# Patient Record
Sex: MALE | Race: WHITE | Employment: OTHER | URBAN - METROPOLITAN AREA
[De-identification: names, ages, dates, MRNs, and addresses within clinical notes are randomized per-mention and may not be internally consistent; named-entity substitution may affect disease eponyms.]

---

## 2017-02-09 ENCOUNTER — HOSPITAL ENCOUNTER (OUTPATIENT)
Age: 72
Discharge: HOME | End: 2017-02-09
Payer: MEDICARE

## 2017-02-09 LAB
ANION GAP SERPL CALCULATED.3IONS-SCNC: 13 MMOL/L (ref 9–17)
BUN BLDV-MCNC: 43 MG/DL (ref 8–23)
BUN/CREAT BLD: ABNORMAL (ref 9–20)
CALCIUM SERPL-MCNC: 9.5 MG/DL (ref 8.6–10.4)
CHLORIDE BLD-SCNC: 103 MMOL/L (ref 98–107)
CO2: 21 MMOL/L (ref 20–31)
CREAT SERPL-MCNC: 1.7 MG/DL (ref 0.7–1.2)
GFR AFRICAN AMERICAN: 48 ML/MIN
GFR NON-AFRICAN AMERICAN: 40 ML/MIN
GFR SERPL CREATININE-BSD FRML MDRD: ABNORMAL ML/MIN/{1.73_M2}
GFR SERPL CREATININE-BSD FRML MDRD: ABNORMAL ML/MIN/{1.73_M2}
GLUCOSE BLD-MCNC: 114 MG/DL (ref 70–99)
POTASSIUM SERPL-SCNC: 5 MMOL/L (ref 3.7–5.3)
SODIUM BLD-SCNC: 137 MMOL/L (ref 135–144)

## 2017-02-09 PROCEDURE — 80048 BASIC METABOLIC PNL TOTAL CA: CPT

## 2017-02-09 PROCEDURE — 36415 COLL VENOUS BLD VENIPUNCTURE: CPT

## 2017-07-27 ENCOUNTER — HOSPITAL ENCOUNTER (INPATIENT)
Age: 72
LOS: 2 days | Discharge: HOME | DRG: 549 | End: 2017-07-29
Attending: EMERGENCY MEDICINE | Admitting: FAMILY MEDICINE
Payer: MEDICARE

## 2017-07-27 ENCOUNTER — APPOINTMENT (OUTPATIENT)
Dept: GENERAL RADIOLOGY | Age: 72
DRG: 549 | End: 2017-07-27
Payer: MEDICARE

## 2017-07-27 DIAGNOSIS — M00.9 PYOGENIC ARTHRITIS OF LEFT KNEE JOINT, DUE TO UNSPECIFIED ORGANISM (HCC): Primary | ICD-10-CM

## 2017-07-27 DIAGNOSIS — M25.462 SWELLING OF LEFT KNEE JOINT: ICD-10-CM

## 2017-07-27 LAB
ABSOLUTE EOS #: 0 K/UL (ref 0–0.4)
ABSOLUTE LYMPH #: 0.5 K/UL (ref 1–4.8)
ABSOLUTE MONO #: 0.4 K/UL (ref 0.1–1.3)
ALBUMIN SERPL-MCNC: 3.9 G/DL (ref 3.5–5.2)
ALBUMIN/GLOBULIN RATIO: ABNORMAL (ref 1–2.5)
ALP BLD-CCNC: 109 U/L (ref 40–129)
ALT SERPL-CCNC: 38 U/L (ref 5–41)
ANION GAP SERPL CALCULATED.3IONS-SCNC: 16 MMOL/L (ref 9–17)
APPEARANCE FLUID: NORMAL
AST SERPL-CCNC: 38 U/L
BASOPHILS # BLD: 1 %
BASOPHILS ABSOLUTE: 0 K/UL (ref 0–0.2)
BILIRUB SERPL-MCNC: 0.89 MG/DL (ref 0.3–1.2)
BUN BLDV-MCNC: 46 MG/DL (ref 8–23)
BUN/CREAT BLD: ABNORMAL (ref 9–20)
C-REACTIVE PROTEIN: 122.1 MG/L (ref 0–5)
CALCIUM SERPL-MCNC: 9.5 MG/DL (ref 8.6–10.4)
CHLORIDE BLD-SCNC: 96 MMOL/L (ref 98–107)
CO2: 21 MMOL/L (ref 20–31)
COLOR FLUID: NORMAL
CREAT SERPL-MCNC: 1.57 MG/DL (ref 0.7–1.2)
CULTURE: NORMAL
DIFFERENTIAL TYPE: ABNORMAL
DIRECT EXAM: NORMAL
EOSINOPHILS RELATIVE PERCENT: 0 %
GFR AFRICAN AMERICAN: 53 ML/MIN
GFR NON-AFRICAN AMERICAN: 44 ML/MIN
GFR SERPL CREATININE-BSD FRML MDRD: ABNORMAL ML/MIN/{1.73_M2}
GFR SERPL CREATININE-BSD FRML MDRD: ABNORMAL ML/MIN/{1.73_M2}
GLUCOSE BLD-MCNC: 116 MG/DL (ref 70–99)
GLUCOSE BLD-MCNC: 134 MG/DL (ref 75–110)
HCT VFR BLD CALC: 39 % (ref 41–53)
HEMOGLOBIN: 12.9 G/DL (ref 13.5–17.5)
LACTIC ACID, WHOLE BLOOD: NORMAL MMOL/L (ref 0.7–2.1)
LACTIC ACID: 1.2 MMOL/L (ref 0.5–2.2)
LYMPHOCYTES # BLD: 9 %
Lab: NORMAL
MCH RBC QN AUTO: 30.2 PG (ref 26–34)
MCHC RBC AUTO-ENTMCNC: 33.1 G/DL (ref 31–37)
MCV RBC AUTO: 91.3 FL (ref 80–100)
MONOCYTES # BLD: 8 %
PDW BLD-RTO: 13.8 % (ref 11.5–14.9)
PLATELET # BLD: 118 K/UL (ref 150–450)
PLATELET ESTIMATE: ABNORMAL
PMV BLD AUTO: 8.6 FL (ref 6–12)
POTASSIUM SERPL-SCNC: 5.2 MMOL/L (ref 3.7–5.3)
RBC # BLD: 4.28 M/UL (ref 4.5–5.9)
RBC # BLD: ABNORMAL 10*6/UL
RBC FLUID: NORMAL /MM3
SEDIMENTATION RATE, ERYTHROCYTE: 54 MM (ref 0–15)
SEG NEUTROPHILS: 82 %
SEGMENTED NEUTROPHILS ABSOLUTE COUNT: 4.1 K/UL (ref 1.3–9.1)
SODIUM BLD-SCNC: 133 MMOL/L (ref 135–144)
SPECIMEN DESCRIPTION: NORMAL
SPECIMEN DESCRIPTION: NORMAL
SPECIMEN TYPE: NORMAL
STATUS: NORMAL
TOTAL PROTEIN: 7.8 G/DL (ref 6.4–8.3)
WBC # BLD: 5.1 K/UL (ref 3.5–11)
WBC # BLD: ABNORMAL 10*3/UL
WBC FLUID: NORMAL /MM3

## 2017-07-27 PROCEDURE — 0S9D3ZZ DRAINAGE OF LEFT KNEE JOINT, PERCUTANEOUS APPROACH: ICD-10-PCS | Performed by: EMERGENCY MEDICINE

## 2017-07-27 PROCEDURE — 88108 CYTOPATH CONCENTRATE TECH: CPT

## 2017-07-27 PROCEDURE — 87205 SMEAR GRAM STAIN: CPT

## 2017-07-27 PROCEDURE — 2500000003 HC RX 250 WO HCPCS: Performed by: EMERGENCY MEDICINE

## 2017-07-27 PROCEDURE — 2580000003 HC RX 258: Performed by: INTERNAL MEDICINE

## 2017-07-27 PROCEDURE — 6360000002 HC RX W HCPCS: Performed by: EMERGENCY MEDICINE

## 2017-07-27 PROCEDURE — 87040 BLOOD CULTURE FOR BACTERIA: CPT

## 2017-07-27 PROCEDURE — 83605 ASSAY OF LACTIC ACID: CPT

## 2017-07-27 PROCEDURE — 86140 C-REACTIVE PROTEIN: CPT

## 2017-07-27 PROCEDURE — 85025 COMPLETE CBC W/AUTO DIFF WBC: CPT

## 2017-07-27 PROCEDURE — 36415 COLL VENOUS BLD VENIPUNCTURE: CPT

## 2017-07-27 PROCEDURE — 73562 X-RAY EXAM OF KNEE 3: CPT

## 2017-07-27 PROCEDURE — 82947 ASSAY GLUCOSE BLOOD QUANT: CPT

## 2017-07-27 PROCEDURE — 87070 CULTURE OTHR SPECIMN AEROBIC: CPT

## 2017-07-27 PROCEDURE — 89060 EXAM SYNOVIAL FLUID CRYSTALS: CPT

## 2017-07-27 PROCEDURE — 80053 COMPREHEN METABOLIC PANEL: CPT

## 2017-07-27 PROCEDURE — 87075 CULTR BACTERIA EXCEPT BLOOD: CPT

## 2017-07-27 PROCEDURE — 6370000000 HC RX 637 (ALT 250 FOR IP): Performed by: FAMILY MEDICINE

## 2017-07-27 PROCEDURE — 2580000003 HC RX 258: Performed by: FAMILY MEDICINE

## 2017-07-27 PROCEDURE — 85651 RBC SED RATE NONAUTOMATED: CPT

## 2017-07-27 PROCEDURE — 6360000002 HC RX W HCPCS: Performed by: INTERNAL MEDICINE

## 2017-07-27 PROCEDURE — 2580000003 HC RX 258: Performed by: EMERGENCY MEDICINE

## 2017-07-27 PROCEDURE — 99284 EMERGENCY DEPT VISIT MOD MDM: CPT

## 2017-07-27 PROCEDURE — 6360000002 HC RX W HCPCS: Performed by: FAMILY MEDICINE

## 2017-07-27 PROCEDURE — 1200000000 HC SEMI PRIVATE

## 2017-07-27 PROCEDURE — 20610 DRAIN/INJ JOINT/BURSA W/O US: CPT

## 2017-07-27 PROCEDURE — 96374 THER/PROPH/DIAG INJ IV PUSH: CPT

## 2017-07-27 RX ORDER — FENTANYL CITRATE 50 UG/ML
100 INJECTION, SOLUTION INTRAMUSCULAR; INTRAVENOUS ONCE
Status: COMPLETED | OUTPATIENT
Start: 2017-07-27 | End: 2017-07-27

## 2017-07-27 RX ORDER — SODIUM CHLORIDE 0.9 % (FLUSH) 0.9 %
10 SYRINGE (ML) INJECTION EVERY 12 HOURS SCHEDULED
Status: DISCONTINUED | OUTPATIENT
Start: 2017-07-27 | End: 2017-07-29 | Stop reason: HOSPADM

## 2017-07-27 RX ORDER — MORPHINE SULFATE 4 MG/ML
2 INJECTION, SOLUTION INTRAMUSCULAR; INTRAVENOUS
Status: DISCONTINUED | OUTPATIENT
Start: 2017-07-27 | End: 2017-07-28 | Stop reason: SDUPTHER

## 2017-07-27 RX ORDER — HYDROCODONE BITARTRATE AND ACETAMINOPHEN 5; 325 MG/1; MG/1
2 TABLET ORAL EVERY 4 HOURS PRN
Status: DISCONTINUED | OUTPATIENT
Start: 2017-07-27 | End: 2017-07-29 | Stop reason: HOSPADM

## 2017-07-27 RX ORDER — ALLOPURINOL 100 MG/1
100 TABLET ORAL DAILY
Status: DISCONTINUED | OUTPATIENT
Start: 2017-07-27 | End: 2017-07-29 | Stop reason: HOSPADM

## 2017-07-27 RX ORDER — SODIUM CHLORIDE 0.9 % (FLUSH) 0.9 %
10 SYRINGE (ML) INJECTION PRN
Status: DISCONTINUED | OUTPATIENT
Start: 2017-07-27 | End: 2017-07-28 | Stop reason: SDUPTHER

## 2017-07-27 RX ORDER — MORPHINE SULFATE 4 MG/ML
4 INJECTION, SOLUTION INTRAMUSCULAR; INTRAVENOUS
Status: DISCONTINUED | OUTPATIENT
Start: 2017-07-27 | End: 2017-07-28 | Stop reason: SDUPTHER

## 2017-07-27 RX ORDER — ATENOLOL 50 MG/1
50 TABLET ORAL DAILY
Status: DISCONTINUED | OUTPATIENT
Start: 2017-07-27 | End: 2017-07-29 | Stop reason: HOSPADM

## 2017-07-27 RX ORDER — HYDROCODONE BITARTRATE AND ACETAMINOPHEN 5; 325 MG/1; MG/1
1 TABLET ORAL EVERY 4 HOURS PRN
Status: DISCONTINUED | OUTPATIENT
Start: 2017-07-27 | End: 2017-07-29 | Stop reason: HOSPADM

## 2017-07-27 RX ORDER — POTASSIUM CHLORIDE 20MEQ/15ML
40 LIQUID (ML) ORAL PRN
Status: DISCONTINUED | OUTPATIENT
Start: 2017-07-27 | End: 2017-07-29 | Stop reason: HOSPADM

## 2017-07-27 RX ORDER — SODIUM CHLORIDE 0.9 % (FLUSH) 0.9 %
10 SYRINGE (ML) INJECTION PRN
Status: DISCONTINUED | OUTPATIENT
Start: 2017-07-27 | End: 2017-07-29 | Stop reason: HOSPADM

## 2017-07-27 RX ORDER — LIDOCAINE HYDROCHLORIDE AND EPINEPHRINE 10; 10 MG/ML; UG/ML
20 INJECTION, SOLUTION INFILTRATION; PERINEURAL ONCE
Status: COMPLETED | OUTPATIENT
Start: 2017-07-27 | End: 2017-07-27

## 2017-07-27 RX ORDER — ACETAMINOPHEN 325 MG/1
650 TABLET ORAL EVERY 4 HOURS PRN
Status: DISCONTINUED | OUTPATIENT
Start: 2017-07-27 | End: 2017-07-29 | Stop reason: HOSPADM

## 2017-07-27 RX ORDER — DOCUSATE SODIUM 100 MG/1
100 CAPSULE, LIQUID FILLED ORAL 2 TIMES DAILY
Status: DISCONTINUED | OUTPATIENT
Start: 2017-07-27 | End: 2017-07-29 | Stop reason: HOSPADM

## 2017-07-27 RX ORDER — SODIUM CHLORIDE 9 MG/ML
INJECTION, SOLUTION INTRAVENOUS CONTINUOUS
Status: DISCONTINUED | OUTPATIENT
Start: 2017-07-27 | End: 2017-07-29 | Stop reason: HOSPADM

## 2017-07-27 RX ORDER — POTASSIUM CHLORIDE 20 MEQ/1
40 TABLET, EXTENDED RELEASE ORAL PRN
Status: DISCONTINUED | OUTPATIENT
Start: 2017-07-27 | End: 2017-07-29 | Stop reason: HOSPADM

## 2017-07-27 RX ORDER — 0.9 % SODIUM CHLORIDE 0.9 %
1000 INTRAVENOUS SOLUTION INTRAVENOUS ONCE
Status: COMPLETED | OUTPATIENT
Start: 2017-07-27 | End: 2017-07-27

## 2017-07-27 RX ORDER — POTASSIUM CHLORIDE 7.45 MG/ML
10 INJECTION INTRAVENOUS PRN
Status: DISCONTINUED | OUTPATIENT
Start: 2017-07-27 | End: 2017-07-29 | Stop reason: HOSPADM

## 2017-07-27 RX ORDER — SODIUM CHLORIDE 0.9 % (FLUSH) 0.9 %
10 SYRINGE (ML) INJECTION EVERY 12 HOURS SCHEDULED
Status: DISCONTINUED | OUTPATIENT
Start: 2017-07-27 | End: 2017-07-28 | Stop reason: SDUPTHER

## 2017-07-27 RX ORDER — ONDANSETRON 2 MG/ML
4 INJECTION INTRAMUSCULAR; INTRAVENOUS EVERY 6 HOURS PRN
Status: DISCONTINUED | OUTPATIENT
Start: 2017-07-27 | End: 2017-07-29 | Stop reason: HOSPADM

## 2017-07-27 RX ORDER — ZOLPIDEM TARTRATE 5 MG/1
5 TABLET ORAL NIGHTLY PRN
Status: DISCONTINUED | OUTPATIENT
Start: 2017-07-27 | End: 2017-07-29 | Stop reason: HOSPADM

## 2017-07-27 RX ADMIN — SODIUM CHLORIDE 1000 ML: 9 INJECTION, SOLUTION INTRAVENOUS at 16:04

## 2017-07-27 RX ADMIN — SODIUM CHLORIDE: 9 INJECTION, SOLUTION INTRAVENOUS at 20:50

## 2017-07-27 RX ADMIN — DEXTROSE 1250 MG: 5 SOLUTION INTRAVENOUS at 18:50

## 2017-07-27 RX ADMIN — MORPHINE SULFATE 4 MG: 4 INJECTION, SOLUTION INTRAMUSCULAR; INTRAVENOUS at 20:28

## 2017-07-27 RX ADMIN — CEFTRIAXONE SODIUM 1 G: 1 INJECTION, POWDER, FOR SOLUTION INTRAMUSCULAR; INTRAVENOUS at 18:34

## 2017-07-27 RX ADMIN — ACETAMINOPHEN 650 MG: 325 TABLET ORAL at 20:28

## 2017-07-27 RX ADMIN — MORPHINE SULFATE 4 MG: 4 INJECTION, SOLUTION INTRAMUSCULAR; INTRAVENOUS at 22:49

## 2017-07-27 RX ADMIN — FENTANYL CITRATE 100 MCG: 50 INJECTION INTRAMUSCULAR; INTRAVENOUS at 18:50

## 2017-07-27 RX ADMIN — CEFEPIME 2 G: 2 INJECTION, POWDER, FOR SOLUTION INTRAMUSCULAR; INTRAVENOUS at 22:50

## 2017-07-27 RX ADMIN — LIDOCAINE HYDROCHLORIDE,EPINEPHRINE BITARTRATE 20 ML: 10; .01 INJECTION, SOLUTION INFILTRATION; PERINEURAL at 16:04

## 2017-07-27 ASSESSMENT — PAIN SCALES - GENERAL
PAINLEVEL_OUTOF10: 10
PAINLEVEL_OUTOF10: 0
PAINLEVEL_OUTOF10: 2
PAINLEVEL_OUTOF10: 10
PAINLEVEL_OUTOF10: 0
PAINLEVEL_OUTOF10: 10

## 2017-07-27 ASSESSMENT — PAIN DESCRIPTION - DESCRIPTORS: DESCRIPTORS: ACHING

## 2017-07-27 ASSESSMENT — PAIN DESCRIPTION - ORIENTATION: ORIENTATION: LEFT

## 2017-07-27 ASSESSMENT — PAIN DESCRIPTION - LOCATION: LOCATION: KNEE

## 2017-07-27 ASSESSMENT — PAIN DESCRIPTION - PAIN TYPE: TYPE: ACUTE PAIN

## 2017-07-27 ASSESSMENT — PAIN DESCRIPTION - FREQUENCY: FREQUENCY: CONTINUOUS

## 2017-07-28 LAB
ABSOLUTE EOS #: 0 K/UL (ref 0–0.4)
ABSOLUTE LYMPH #: 0.6 K/UL (ref 1–4.8)
ABSOLUTE MONO #: 0.5 K/UL (ref 0.1–1.3)
ALBUMIN SERPL-MCNC: 2.9 G/DL (ref 3.5–5.2)
ALBUMIN/GLOBULIN RATIO: ABNORMAL (ref 1–2.5)
ALP BLD-CCNC: 81 U/L (ref 40–129)
ALT SERPL-CCNC: 25 U/L (ref 5–41)
ANION GAP SERPL CALCULATED.3IONS-SCNC: 13 MMOL/L (ref 9–17)
AST SERPL-CCNC: 28 U/L
BASO FLUID: ABNORMAL %
BASOPHILS # BLD: 0 %
BASOPHILS ABSOLUTE: 0 K/UL (ref 0–0.2)
BILIRUB SERPL-MCNC: 0.8 MG/DL (ref 0.3–1.2)
BUN BLDV-MCNC: 44 MG/DL (ref 8–23)
BUN/CREAT BLD: ABNORMAL (ref 9–20)
CALCIUM SERPL-MCNC: 8.2 MG/DL (ref 8.6–10.4)
CHLORIDE BLD-SCNC: 103 MMOL/L (ref 98–107)
CO2: 19 MMOL/L (ref 20–31)
CREAT SERPL-MCNC: 1.6 MG/DL (ref 0.7–1.2)
CRYSTALS, FLUID: NORMAL
DIFFERENTIAL TYPE: ABNORMAL
EOSINOPHIL FLUID: ABNORMAL %
EOSINOPHILS RELATIVE PERCENT: 1 %
FLUID DIFF COMMENT: ABNORMAL
GFR AFRICAN AMERICAN: 52 ML/MIN
GFR NON-AFRICAN AMERICAN: 43 ML/MIN
GFR SERPL CREATININE-BSD FRML MDRD: ABNORMAL ML/MIN/{1.73_M2}
GFR SERPL CREATININE-BSD FRML MDRD: ABNORMAL ML/MIN/{1.73_M2}
GLUCOSE BLD-MCNC: 110 MG/DL (ref 70–99)
HCT VFR BLD CALC: 31.4 % (ref 41–53)
HEMOGLOBIN: 10.4 G/DL (ref 13.5–17.5)
LYMPHOCYTES # BLD: 13 %
LYMPHOCYTES, BODY FLUID: 2 %
MCH RBC QN AUTO: 30.2 PG (ref 26–34)
MCHC RBC AUTO-ENTMCNC: 33.1 G/DL (ref 31–37)
MCV RBC AUTO: 91.2 FL (ref 80–100)
MONOCYTE, FLUID: 9 %
MONOCYTES # BLD: 10 %
NEUTROPHIL, FLUID: 89 %
OTHER CELLS FLUID: ABNORMAL %
PDW BLD-RTO: 13.5 % (ref 11.5–14.9)
PLATELET # BLD: 104 K/UL (ref 150–450)
PLATELET ESTIMATE: ABNORMAL
PMV BLD AUTO: 9 FL (ref 6–12)
POTASSIUM SERPL-SCNC: 4.8 MMOL/L (ref 3.7–5.3)
RBC # BLD: 3.45 M/UL (ref 4.5–5.9)
RBC # BLD: ABNORMAL 10*6/UL
SEG NEUTROPHILS: 76 %
SEGMENTED NEUTROPHILS ABSOLUTE COUNT: 3.7 K/UL (ref 1.3–9.1)
SODIUM BLD-SCNC: 135 MMOL/L (ref 135–144)
SPECIMEN TYPE: NORMAL
TOTAL PROTEIN: 6.2 G/DL (ref 6.4–8.3)
WBC # BLD: 4.9 K/UL (ref 3.5–11)
WBC # BLD: ABNORMAL 10*3/UL

## 2017-07-28 PROCEDURE — 1200000000 HC SEMI PRIVATE

## 2017-07-28 PROCEDURE — G8987 SELF CARE CURRENT STATUS: HCPCS

## 2017-07-28 PROCEDURE — G8989 SELF CARE D/C STATUS: HCPCS

## 2017-07-28 PROCEDURE — 97161 PT EVAL LOW COMPLEX 20 MIN: CPT

## 2017-07-28 PROCEDURE — 6370000000 HC RX 637 (ALT 250 FOR IP): Performed by: FAMILY MEDICINE

## 2017-07-28 PROCEDURE — 36415 COLL VENOUS BLD VENIPUNCTURE: CPT

## 2017-07-28 PROCEDURE — 80053 COMPREHEN METABOLIC PANEL: CPT

## 2017-07-28 PROCEDURE — 85025 COMPLETE CBC W/AUTO DIFF WBC: CPT

## 2017-07-28 PROCEDURE — 97165 OT EVAL LOW COMPLEX 30 MIN: CPT

## 2017-07-28 PROCEDURE — G8988 SELF CARE GOAL STATUS: HCPCS

## 2017-07-28 PROCEDURE — 2580000003 HC RX 258: Performed by: FAMILY MEDICINE

## 2017-07-28 PROCEDURE — 97535 SELF CARE MNGMENT TRAINING: CPT

## 2017-07-28 PROCEDURE — 6360000002 HC RX W HCPCS: Performed by: FAMILY MEDICINE

## 2017-07-28 RX ORDER — COLCHICINE 0.6 MG/1
1 CAPSULE ORAL 2 TIMES DAILY
Status: DISCONTINUED | OUTPATIENT
Start: 2017-07-28 | End: 2017-07-29 | Stop reason: HOSPADM

## 2017-07-28 RX ORDER — MORPHINE SULFATE 4 MG/ML
4 INJECTION, SOLUTION INTRAMUSCULAR; INTRAVENOUS
Status: DISCONTINUED | OUTPATIENT
Start: 2017-07-28 | End: 2017-07-29 | Stop reason: HOSPADM

## 2017-07-28 RX ORDER — MORPHINE SULFATE 4 MG/ML
2 INJECTION, SOLUTION INTRAMUSCULAR; INTRAVENOUS
Status: DISCONTINUED | OUTPATIENT
Start: 2017-07-28 | End: 2017-07-29 | Stop reason: HOSPADM

## 2017-07-28 RX ORDER — METHYLPREDNISOLONE SODIUM SUCCINATE 40 MG/ML
40 INJECTION, POWDER, LYOPHILIZED, FOR SOLUTION INTRAMUSCULAR; INTRAVENOUS EVERY 8 HOURS
Status: DISCONTINUED | OUTPATIENT
Start: 2017-07-28 | End: 2017-07-29 | Stop reason: HOSPADM

## 2017-07-28 RX ADMIN — HYDROCODONE BITARTRATE AND ACETAMINOPHEN 2 TABLET: 5; 325 TABLET ORAL at 10:06

## 2017-07-28 RX ADMIN — SODIUM CHLORIDE: 9 INJECTION, SOLUTION INTRAVENOUS at 04:15

## 2017-07-28 RX ADMIN — COLCHICINE 0.6 MG: 0.6 CAPSULE ORAL at 13:03

## 2017-07-28 RX ADMIN — METHYLPREDNISOLONE SODIUM SUCCINATE 40 MG: 40 INJECTION, POWDER, FOR SOLUTION INTRAMUSCULAR; INTRAVENOUS at 20:46

## 2017-07-28 RX ADMIN — DOCUSATE SODIUM 100 MG: 100 CAPSULE, LIQUID FILLED ORAL at 20:43

## 2017-07-28 RX ADMIN — HYDROCODONE BITARTRATE AND ACETAMINOPHEN 2 TABLET: 5; 325 TABLET ORAL at 18:19

## 2017-07-28 RX ADMIN — COLCHICINE 0.6 MG: 0.6 CAPSULE ORAL at 20:45

## 2017-07-28 RX ADMIN — HYDROCODONE BITARTRATE AND ACETAMINOPHEN 2 TABLET: 5; 325 TABLET ORAL at 04:13

## 2017-07-28 RX ADMIN — METHYLPREDNISOLONE SODIUM SUCCINATE 40 MG: 40 INJECTION, POWDER, FOR SOLUTION INTRAMUSCULAR; INTRAVENOUS at 13:03

## 2017-07-28 RX ADMIN — SODIUM CHLORIDE: 9 INJECTION, SOLUTION INTRAVENOUS at 20:35

## 2017-07-28 ASSESSMENT — PAIN DESCRIPTION - PAIN TYPE
TYPE: ACUTE PAIN
TYPE: ACUTE PAIN

## 2017-07-28 ASSESSMENT — PAIN SCALES - GENERAL
PAINLEVEL_OUTOF10: 0
PAINLEVEL_OUTOF10: 0
PAINLEVEL_OUTOF10: 10
PAINLEVEL_OUTOF10: 6
PAINLEVEL_OUTOF10: 10
PAINLEVEL_OUTOF10: 0
PAINLEVEL_OUTOF10: 6
PAINLEVEL_OUTOF10: 9

## 2017-07-28 ASSESSMENT — PAIN DESCRIPTION - ORIENTATION
ORIENTATION: LEFT
ORIENTATION: LEFT

## 2017-07-28 ASSESSMENT — PAIN DESCRIPTION - FREQUENCY: FREQUENCY: CONTINUOUS

## 2017-07-28 ASSESSMENT — PAIN DESCRIPTION - DESCRIPTORS: DESCRIPTORS: ACHING

## 2017-07-28 ASSESSMENT — PAIN DESCRIPTION - LOCATION: LOCATION: KNEE

## 2017-07-29 VITALS
RESPIRATION RATE: 16 BRPM | BODY MASS INDEX: 25.48 KG/M2 | WEIGHT: 182 LBS | SYSTOLIC BLOOD PRESSURE: 114 MMHG | TEMPERATURE: 97.8 F | HEART RATE: 68 BPM | HEIGHT: 71 IN | DIASTOLIC BLOOD PRESSURE: 57 MMHG | OXYGEN SATURATION: 68 %

## 2017-07-29 LAB
ABSOLUTE BANDS #: 0.06 K/UL (ref 0–1)
ABSOLUTE EOS #: 0 K/UL (ref 0–0.4)
ABSOLUTE LYMPH #: 0.25 K/UL (ref 1–4.8)
ABSOLUTE MONO #: 0.13 K/UL (ref 0.1–1.3)
ALBUMIN SERPL-MCNC: 2.9 G/DL (ref 3.5–5.2)
ALBUMIN/GLOBULIN RATIO: ABNORMAL (ref 1–2.5)
ALP BLD-CCNC: 134 U/L (ref 40–129)
ALT SERPL-CCNC: 31 U/L (ref 5–41)
ANION GAP SERPL CALCULATED.3IONS-SCNC: 14 MMOL/L (ref 9–17)
AST SERPL-CCNC: 34 U/L
BANDS: 1 %
BASOPHILS # BLD: 0 %
BASOPHILS ABSOLUTE: 0 K/UL (ref 0–0.2)
BILIRUB SERPL-MCNC: 0.37 MG/DL (ref 0.3–1.2)
BUN BLDV-MCNC: 39 MG/DL (ref 8–23)
BUN/CREAT BLD: ABNORMAL (ref 9–20)
CALCIUM SERPL-MCNC: 8.5 MG/DL (ref 8.6–10.4)
CHLORIDE BLD-SCNC: 106 MMOL/L (ref 98–107)
CO2: 18 MMOL/L (ref 20–31)
CREAT SERPL-MCNC: 1.31 MG/DL (ref 0.7–1.2)
DIFFERENTIAL TYPE: ABNORMAL
EOSINOPHILS RELATIVE PERCENT: 0 %
GFR AFRICAN AMERICAN: >60 ML/MIN
GFR NON-AFRICAN AMERICAN: 54 ML/MIN
GFR SERPL CREATININE-BSD FRML MDRD: ABNORMAL ML/MIN/{1.73_M2}
GFR SERPL CREATININE-BSD FRML MDRD: ABNORMAL ML/MIN/{1.73_M2}
GLUCOSE BLD-MCNC: 187 MG/DL (ref 70–99)
HCT VFR BLD CALC: 32.9 % (ref 41–53)
HEMOGLOBIN: 10.8 G/DL (ref 13.5–17.5)
LYMPHOCYTES # BLD: 4 %
MCH RBC QN AUTO: 30.2 PG (ref 26–34)
MCHC RBC AUTO-ENTMCNC: 33 G/DL (ref 31–37)
MCV RBC AUTO: 91.6 FL (ref 80–100)
MONOCYTES # BLD: 2 %
MORPHOLOGY: NORMAL
PDW BLD-RTO: 13.5 % (ref 11.5–14.9)
PLATELET # BLD: 120 K/UL (ref 150–450)
PLATELET ESTIMATE: ABNORMAL
PMV BLD AUTO: 8.5 FL (ref 6–12)
POTASSIUM SERPL-SCNC: 4.9 MMOL/L (ref 3.7–5.3)
RBC # BLD: 3.59 M/UL (ref 4.5–5.9)
RBC # BLD: ABNORMAL 10*6/UL
SEG NEUTROPHILS: 93 %
SEGMENTED NEUTROPHILS ABSOLUTE COUNT: 5.86 K/UL (ref 1.3–9.1)
SODIUM BLD-SCNC: 138 MMOL/L (ref 135–144)
TOTAL PROTEIN: 6.3 G/DL (ref 6.4–8.3)
WBC # BLD: 6.3 K/UL (ref 3.5–11)
WBC # BLD: ABNORMAL 10*3/UL

## 2017-07-29 PROCEDURE — 36415 COLL VENOUS BLD VENIPUNCTURE: CPT

## 2017-07-29 PROCEDURE — 6370000000 HC RX 637 (ALT 250 FOR IP): Performed by: FAMILY MEDICINE

## 2017-07-29 PROCEDURE — 2580000003 HC RX 258: Performed by: FAMILY MEDICINE

## 2017-07-29 PROCEDURE — 85025 COMPLETE CBC W/AUTO DIFF WBC: CPT

## 2017-07-29 PROCEDURE — 80053 COMPREHEN METABOLIC PANEL: CPT

## 2017-07-29 PROCEDURE — 6360000002 HC RX W HCPCS: Performed by: FAMILY MEDICINE

## 2017-07-29 RX ORDER — HYDROCODONE BITARTRATE AND ACETAMINOPHEN 5; 325 MG/1; MG/1
1 TABLET ORAL EVERY 4 HOURS PRN
Qty: 15 TABLET | Refills: 0 | Status: SHIPPED | OUTPATIENT
Start: 2017-07-29 | End: 2017-08-05

## 2017-07-29 RX ORDER — COLCHICINE 0.6 MG/1
1 CAPSULE ORAL 2 TIMES DAILY
Qty: 30 CAPSULE | Refills: 0 | Status: SHIPPED | OUTPATIENT
Start: 2017-07-29 | End: 2018-08-19

## 2017-07-29 RX ORDER — PREDNISONE 20 MG/1
40 TABLET ORAL DAILY
Qty: 10 TABLET | Refills: 0 | Status: SHIPPED | OUTPATIENT
Start: 2017-07-29 | End: 2017-08-03

## 2017-07-29 RX ADMIN — COLCHICINE 0.6 MG: 0.6 CAPSULE ORAL at 10:26

## 2017-07-29 RX ADMIN — HYDROCODONE BITARTRATE AND ACETAMINOPHEN 1 TABLET: 5; 325 TABLET ORAL at 08:21

## 2017-07-29 RX ADMIN — DOCUSATE SODIUM 100 MG: 100 CAPSULE, LIQUID FILLED ORAL at 08:16

## 2017-07-29 RX ADMIN — SODIUM CHLORIDE: 9 INJECTION, SOLUTION INTRAVENOUS at 04:15

## 2017-07-29 RX ADMIN — METHYLPREDNISOLONE SODIUM SUCCINATE 40 MG: 40 INJECTION, POWDER, FOR SOLUTION INTRAMUSCULAR; INTRAVENOUS at 04:18

## 2017-07-29 RX ADMIN — ALLOPURINOL 100 MG: 100 TABLET ORAL at 08:16

## 2017-07-29 RX ADMIN — ATENOLOL 50 MG: 50 TABLET ORAL at 08:16

## 2017-07-29 ASSESSMENT — PAIN SCALES - GENERAL: PAINLEVEL_OUTOF10: 3

## 2017-08-02 LAB
CULTURE: ABNORMAL
CULTURE: ABNORMAL
DIRECT EXAM: ABNORMAL
Lab: ABNORMAL
SPECIMEN DESCRIPTION: ABNORMAL
STATUS: ABNORMAL

## 2017-08-03 LAB
CULTURE: NORMAL
Lab: NORMAL
SPECIMEN DESCRIPTION: NORMAL
STATUS: NORMAL
STATUS: NORMAL

## 2017-11-04 ENCOUNTER — HOSPITAL ENCOUNTER (OUTPATIENT)
Dept: GENERAL RADIOLOGY | Age: 72
Discharge: HOME | End: 2017-11-04
Payer: MEDICARE

## 2017-11-04 ENCOUNTER — HOSPITAL ENCOUNTER (OUTPATIENT)
Age: 72
Discharge: HOME | End: 2017-11-04
Payer: MEDICARE

## 2017-11-04 DIAGNOSIS — J84.89 BOOP (BRONCHIOLITIS OBLITERANS WITH ORGANIZING PNEUMONIA) (HCC): ICD-10-CM

## 2017-11-04 PROCEDURE — 71020 XR CHEST STANDARD TWO VW: CPT

## 2018-01-30 ENCOUNTER — HOSPITAL ENCOUNTER (OUTPATIENT)
Age: 73
Discharge: HOME | End: 2018-01-30
Payer: MEDICARE

## 2018-01-30 LAB
ANION GAP SERPL CALCULATED.3IONS-SCNC: 12 MMOL/L (ref 9–17)
BUN BLDV-MCNC: 41 MG/DL (ref 8–23)
BUN/CREAT BLD: ABNORMAL (ref 9–20)
CALCIUM SERPL-MCNC: 9.4 MG/DL (ref 8.6–10.4)
CHLORIDE BLD-SCNC: 108 MMOL/L (ref 98–107)
CO2: 22 MMOL/L (ref 20–31)
CREAT SERPL-MCNC: 1.52 MG/DL (ref 0.7–1.2)
DATE, STOOL #1: NORMAL
DATE, STOOL #2: NORMAL
DATE, STOOL #3: NORMAL
GFR AFRICAN AMERICAN: 55 ML/MIN
GFR NON-AFRICAN AMERICAN: 45 ML/MIN
GFR SERPL CREATININE-BSD FRML MDRD: ABNORMAL ML/MIN/{1.73_M2}
GFR SERPL CREATININE-BSD FRML MDRD: ABNORMAL ML/MIN/{1.73_M2}
GLUCOSE BLD-MCNC: 114 MG/DL (ref 70–99)
HEMOCCULT SP1 STL QL: NEGATIVE
HEMOCCULT SP2 STL QL: NEGATIVE
HEMOCCULT SP3 STL QL: NEGATIVE
POTASSIUM SERPL-SCNC: 5.1 MMOL/L (ref 3.7–5.3)
PROSTATE SPECIFIC ANTIGEN: 0.46 UG/L
SODIUM BLD-SCNC: 142 MMOL/L (ref 135–144)
TIME, STOOL #1: NORMAL
TIME, STOOL #2: NORMAL
TIME, STOOL #3: NORMAL

## 2018-01-30 PROCEDURE — G0328 FECAL BLOOD SCRN IMMUNOASSAY: HCPCS

## 2018-01-30 PROCEDURE — 36415 COLL VENOUS BLD VENIPUNCTURE: CPT

## 2018-01-30 PROCEDURE — 80048 BASIC METABOLIC PNL TOTAL CA: CPT

## 2018-01-30 PROCEDURE — G0103 PSA SCREENING: HCPCS

## 2018-08-19 ENCOUNTER — HOSPITAL ENCOUNTER (EMERGENCY)
Age: 73
Discharge: HOME | End: 2018-08-19
Attending: EMERGENCY MEDICINE
Payer: MEDICARE

## 2018-08-19 ENCOUNTER — APPOINTMENT (OUTPATIENT)
Dept: GENERAL RADIOLOGY | Age: 73
End: 2018-08-19
Payer: MEDICARE

## 2018-08-19 VITALS
HEIGHT: 71 IN | DIASTOLIC BLOOD PRESSURE: 62 MMHG | SYSTOLIC BLOOD PRESSURE: 123 MMHG | BODY MASS INDEX: 26.6 KG/M2 | OXYGEN SATURATION: 98 % | WEIGHT: 190 LBS | RESPIRATION RATE: 16 BRPM | TEMPERATURE: 97.7 F | HEART RATE: 76 BPM

## 2018-08-19 DIAGNOSIS — M11.231 PSEUDOGOUT OF RIGHT WRIST: Primary | ICD-10-CM

## 2018-08-19 LAB
ABSOLUTE EOS #: 0 K/UL (ref 0–0.4)
ABSOLUTE IMMATURE GRANULOCYTE: ABNORMAL K/UL (ref 0–0.3)
ABSOLUTE LYMPH #: 0.6 K/UL (ref 1–4.8)
ABSOLUTE MONO #: 0.4 K/UL (ref 0.1–1.3)
ANION GAP SERPL CALCULATED.3IONS-SCNC: 12 MMOL/L (ref 9–17)
APPEARANCE FLUID: NORMAL
BASOPHILS # BLD: 0 % (ref 0–2)
BASOPHILS ABSOLUTE: 0 K/UL (ref 0–0.2)
BUN BLDV-MCNC: 38 MG/DL (ref 8–23)
BUN/CREAT BLD: ABNORMAL (ref 9–20)
C-REACTIVE PROTEIN: 46.1 MG/L (ref 0–5)
CALCIUM SERPL-MCNC: 8.8 MG/DL (ref 8.6–10.4)
CHLORIDE BLD-SCNC: 101 MMOL/L (ref 98–107)
CO2: 18 MMOL/L (ref 20–31)
COLOR FLUID: YELLOW
CREAT SERPL-MCNC: 1.66 MG/DL (ref 0.7–1.2)
CRYSTALS, FLUID: NORMAL
CULTURE: NORMAL
CULTURE: NORMAL
DIFFERENTIAL TYPE: ABNORMAL
DIRECT EXAM: NORMAL
EOSINOPHILS RELATIVE PERCENT: 1 % (ref 0–4)
GFR AFRICAN AMERICAN: 49 ML/MIN
GFR NON-AFRICAN AMERICAN: 41 ML/MIN
GFR SERPL CREATININE-BSD FRML MDRD: ABNORMAL ML/MIN/{1.73_M2}
GFR SERPL CREATININE-BSD FRML MDRD: ABNORMAL ML/MIN/{1.73_M2}
GLUCOSE BLD-MCNC: 108 MG/DL (ref 70–99)
HCT VFR BLD CALC: 38.9 % (ref 41–53)
HEMOGLOBIN: 13.2 G/DL (ref 13.5–17.5)
IMMATURE GRANULOCYTES: ABNORMAL %
LYMPHOCYTES # BLD: 10 % (ref 24–44)
Lab: NORMAL
MCH RBC QN AUTO: 31.2 PG (ref 26–34)
MCHC RBC AUTO-ENTMCNC: 33.9 G/DL (ref 31–37)
MCV RBC AUTO: 92 FL (ref 80–100)
MONOCYTES # BLD: 7 % (ref 1–7)
NRBC AUTOMATED: ABNORMAL PER 100 WBC
PDW BLD-RTO: 13.6 % (ref 11.5–14.9)
PLATELET # BLD: 100 K/UL (ref 150–450)
PLATELET ESTIMATE: ABNORMAL
PMV BLD AUTO: 8.7 FL (ref 6–12)
POTASSIUM SERPL-SCNC: 5.3 MMOL/L (ref 3.7–5.3)
RBC # BLD: 4.24 M/UL (ref 4.5–5.9)
RBC # BLD: ABNORMAL 10*6/UL
RBC FLUID: 3095 /MM3
SEDIMENTATION RATE, ERYTHROCYTE: 40 MM (ref 0–15)
SEG NEUTROPHILS: 82 % (ref 36–66)
SEGMENTED NEUTROPHILS ABSOLUTE COUNT: 4.9 K/UL (ref 1.3–9.1)
SODIUM BLD-SCNC: 131 MMOL/L (ref 135–144)
SPECIMEN DESCRIPTION: NORMAL
SPECIMEN TYPE: NORMAL
SPECIMEN TYPE: NORMAL
STATUS: NORMAL
WBC # BLD: 6 K/UL (ref 3.5–11)
WBC # BLD: ABNORMAL 10*3/UL
WBC FLUID: 3355 /MM3

## 2018-08-19 PROCEDURE — 89060 EXAM SYNOVIAL FLUID CRYSTALS: CPT

## 2018-08-19 PROCEDURE — 87205 SMEAR GRAM STAIN: CPT

## 2018-08-19 PROCEDURE — 85025 COMPLETE CBC W/AUTO DIFF WBC: CPT

## 2018-08-19 PROCEDURE — 87075 CULTR BACTERIA EXCEPT BLOOD: CPT

## 2018-08-19 PROCEDURE — 2500000003 HC RX 250 WO HCPCS: Performed by: EMERGENCY MEDICINE

## 2018-08-19 PROCEDURE — 36415 COLL VENOUS BLD VENIPUNCTURE: CPT

## 2018-08-19 PROCEDURE — 80048 BASIC METABOLIC PNL TOTAL CA: CPT

## 2018-08-19 PROCEDURE — 88108 CYTOPATH CONCENTRATE TECH: CPT

## 2018-08-19 PROCEDURE — 99283 EMERGENCY DEPT VISIT LOW MDM: CPT

## 2018-08-19 PROCEDURE — 85651 RBC SED RATE NONAUTOMATED: CPT

## 2018-08-19 PROCEDURE — 87070 CULTURE OTHR SPECIMN AEROBIC: CPT

## 2018-08-19 PROCEDURE — 86140 C-REACTIVE PROTEIN: CPT

## 2018-08-19 PROCEDURE — 73110 X-RAY EXAM OF WRIST: CPT

## 2018-08-19 RX ORDER — COLCHICINE 0.6 MG/1
0.6 TABLET ORAL 2 TIMES DAILY
Qty: 14 TABLET | Refills: 0 | Status: SHIPPED | OUTPATIENT
Start: 2018-08-26 | End: 2019-01-29 | Stop reason: ALTCHOICE

## 2018-08-19 RX ORDER — FENTANYL CITRATE 50 UG/ML
50 INJECTION, SOLUTION INTRAMUSCULAR; INTRAVENOUS ONCE
Status: DISCONTINUED | OUTPATIENT
Start: 2018-08-19 | End: 2018-08-19

## 2018-08-19 RX ORDER — TRAMADOL HYDROCHLORIDE 50 MG/1
50 TABLET ORAL EVERY 6 HOURS PRN
COMMUNITY
End: 2018-08-19

## 2018-08-19 RX ORDER — TRAMADOL HYDROCHLORIDE 50 MG/1
50 TABLET ORAL EVERY 6 HOURS PRN
Qty: 20 TABLET | Refills: 0 | Status: SHIPPED | OUTPATIENT
Start: 2018-08-19 | End: 2018-08-24

## 2018-08-19 RX ORDER — LIDOCAINE HYDROCHLORIDE 10 MG/ML
5 INJECTION, SOLUTION INFILTRATION; PERINEURAL ONCE
Status: COMPLETED | OUTPATIENT
Start: 2018-08-19 | End: 2018-08-19

## 2018-08-19 RX ADMIN — LIDOCAINE HYDROCHLORIDE 5 ML: 10 INJECTION, SOLUTION INFILTRATION; PERINEURAL at 15:54

## 2018-08-19 ASSESSMENT — ENCOUNTER SYMPTOMS
COUGH: 0
BACK PAIN: 0
DIARRHEA: 0
EYE PAIN: 0
SHORTNESS OF BREATH: 0
NAUSEA: 0
CONSTIPATION: 0
CHEST TIGHTNESS: 0
ABDOMINAL PAIN: 0
SORE THROAT: 0
VOMITING: 0

## 2018-08-19 ASSESSMENT — PAIN DESCRIPTION - PAIN TYPE: TYPE: ACUTE PAIN

## 2018-08-19 ASSESSMENT — PAIN DESCRIPTION - LOCATION: LOCATION: HAND

## 2018-08-19 ASSESSMENT — PAIN SCALES - GENERAL: PAINLEVEL_OUTOF10: 10

## 2018-08-19 ASSESSMENT — PAIN DESCRIPTION - ORIENTATION: ORIENTATION: RIGHT

## 2018-08-19 NOTE — ED PROVIDER NOTES
Swelling extends over the dorsum of the hand into all fingers. Pain with passive motion of wrist. 2+ radial and ulnar pulses. Sensation intact in all dermatomes of the hand. Range of motion of fingers limited by swelling. No tenderness over fingers or dorsum of hand. Neurological: He is alert and oriented to person, place, and time. No cranial nerve deficit. Coordination normal.   Skin: Skin is warm and dry. Nursing note and vitals reviewed. MEDICAL DECISION MAKING:   Assessment:  Maggie Andrade is a 74 y.o. male who presents with wrist pain and swelling     Differential Diagnosis: gout, septic joint, fracture, muscular strain or sprain, ligamentous injury    Plan:  X-ray  Analgesia   Arthrocentesis with cell count, culture and crystals     ED Course/MDM:   Patient arrived hemodynamically stable and in no acute distress. Patient's previous imaging and studies reviewed. Based on patient's history and physical exam will obtain an x-ray to evaluate for fracture. X-ray revealed evidence of gout/tophaceous gout. Bedside arthrocentesis revealed small amount of synovial fluid, tap was traumatic and contaminated with blood. Fluid resulted with 3000 RBC and 3000 WBC. Crystals and culture pending. Based on cell count and chronicity of wrist swelling intermittently over several months suspect gout vs pseudogout. Given renal dysfunction will avoid NSAIDs and start colchicine. Strict return precautions given. I asked the patient to make an appt with his PCP Dr Enid Carias next week for a recheck of renal function.             PROCEDURES:    Arthrocentesis  Date/Time: 8/19/2018 4:43 PM  Performed by: Marielle Huston  Authorized by: Marielle Huston     Consent:     Consent obtained:  Verbal    Consent given by:  Patient    Risks discussed:  Bleeding, pain, infection and incomplete drainage    Alternatives discussed:  Delayed treatment  Location:     Location:  Wrist  Anesthesia (see MAR for exact dosages): GFR Non- 41 (*)     GFR  49 (*)     All other components within normal limits   SEDIMENTATION RATE - Abnormal; Notable for the following:     Sed Rate 40 (*)     All other components within normal limits   DIFFERENTIAL, BODY FLUID - Abnormal; Notable for the following:     Neutrophil Count, Fluid 86 (*)     Lymphocytes, Body Fluid 10 (*)     Monocyte Count, Fluid 4 (*)     All other components within normal limits   BODY FLUID CULTURE   BODY FLUID CELL COUNT   BODY FLUID CRYSTAL     EMERGENCY DEPARTMENT COURSE:   Vitals:    Vitals:    08/19/18 1207   BP: 123/62   Pulse: 76   Resp: 16   Temp: 97.7 °F (36.5 °C)   TempSrc: Oral   SpO2: 98%   Weight: 190 lb (86.2 kg)   Height: 5' 11\" (1.803 m)       The patient was given the following medications while in the emergency department:  Orders Placed This Encounter   Medications    lidocaine 1 % injection 5 mL    DISCONTD: fentaNYL (SUBLIMAZE) injection 50 mcg    colchicine (COLCRYS) 0.6 MG tablet     Sig: Take 1 tablet by mouth 2 times daily     Dispense:  14 tablet     Refill:  0    traMADol (ULTRAM) 50 MG tablet     Sig: Take 1 tablet by mouth every 6 hours as needed for Pain for up to 5 days. Intended supply: 3 days. Take lowest dose possible to manage pain. Dispense:  20 tablet     Refill:  0     CONSULTS:  None    FINAL IMPRESSION      1.  Pseudogout of right wrist          DISPOSITION/PLAN   DISPOSITION Decision To Discharge 08/19/2018 04:05:22 PM      PATIENT REFERRED TO:  Annel Park MD  [place] [phone]    Schedule an appointment as soon as possible for a visit       DISCHARGE MEDICATIONS:  Discharge Medication List as of 8/19/2018  4:09 PM      START taking these medications    Details   colchicine (COLCRYS) 0.6 MG tablet Take 1 tablet by mouth 2 times daily, Disp-14 tablet, R-0Print           Shyann Schuler MD  Attending Emergency Physician

## 2018-08-20 LAB
BASO FLUID: ABNORMAL %
EOSINOPHIL FLUID: ABNORMAL %
FLUID DIFF COMMENT: ABNORMAL
LYMPHOCYTES, BODY FLUID: 10 %
MONOCYTE, FLUID: 4 %
NEUTROPHIL, FLUID: 86 %
OTHER CELLS FLUID: ABNORMAL %

## 2018-08-24 LAB
CULTURE: ABNORMAL
DIRECT EXAM: ABNORMAL
Lab: ABNORMAL
SPECIMEN DESCRIPTION: ABNORMAL
STATUS: ABNORMAL

## 2018-09-04 ENCOUNTER — HOSPITAL ENCOUNTER (OUTPATIENT)
Age: 73
Discharge: HOME | End: 2018-09-04
Payer: MEDICARE

## 2018-09-04 LAB
ABSOLUTE RETIC #: 0.05 M/UL (ref 0.02–0.1)
FERRITIN: 1357 UG/L (ref 30–400)
IMMATURE RETIC FRACT: NORMAL %
RETIC %: 1.1 % (ref 0.5–2)
RETIC HEMOGLOBIN: NORMAL PG (ref 28.2–35.7)
VITAMIN B-12: 647 PG/ML (ref 232–1245)

## 2018-09-04 PROCEDURE — 36415 COLL VENOUS BLD VENIPUNCTURE: CPT

## 2018-09-04 PROCEDURE — 82607 VITAMIN B-12: CPT

## 2018-09-04 PROCEDURE — 82728 ASSAY OF FERRITIN: CPT

## 2018-09-04 PROCEDURE — 82747 ASSAY OF FOLIC ACID RBC: CPT

## 2018-09-04 PROCEDURE — 85045 AUTOMATED RETICULOCYTE COUNT: CPT

## 2018-09-07 LAB
HCT VFR BLD CALC: 41.9 % (ref 41–53)
RBC FOLATE: 526.3 NG/ML (ref 208–903)

## 2018-09-13 ENCOUNTER — HOSPITAL ENCOUNTER (OUTPATIENT)
Age: 73
Discharge: HOME | End: 2018-09-13
Payer: MEDICARE

## 2018-09-13 LAB
ALT SERPL-CCNC: 53 U/L (ref 5–41)
IRON SATURATION: 32 % (ref 20–55)
IRON: 69 UG/DL (ref 59–158)
TOTAL IRON BINDING CAPACITY: 217 UG/DL (ref 250–450)
UNSATURATED IRON BINDING CAPACITY: 148 UG/DL (ref 112–347)

## 2018-09-13 PROCEDURE — 81256 HFE GENE: CPT

## 2018-09-13 PROCEDURE — 83540 ASSAY OF IRON: CPT

## 2018-09-13 PROCEDURE — 36415 COLL VENOUS BLD VENIPUNCTURE: CPT

## 2018-09-13 PROCEDURE — 84460 ALANINE AMINO (ALT) (SGPT): CPT

## 2018-09-13 PROCEDURE — 83550 IRON BINDING TEST: CPT

## 2018-09-17 LAB
C282Y HEMOCHROMATOSIS MUT: NEGATIVE
H63D HEMOCHROMATOSIS MUT: NORMAL
HEMOCHROMATOSIS MUTATION INT: NORMAL
HEMOCHROMATOSIS SPECIMEN: NORMAL
S65C HEMOCHROMATOSIS MUT: NEGATIVE

## 2018-10-04 ENCOUNTER — TELEPHONE (OUTPATIENT)
Dept: ONCOLOGY | Age: 73
End: 2018-10-04

## 2018-12-27 ENCOUNTER — TELEPHONE (OUTPATIENT)
Dept: ONCOLOGY | Age: 73
End: 2018-12-27

## 2019-01-02 ENCOUNTER — HOSPITAL ENCOUNTER (OUTPATIENT)
Dept: GENERAL RADIOLOGY | Age: 74
Discharge: HOME | End: 2019-01-04
Payer: MEDICARE

## 2019-01-02 ENCOUNTER — HOSPITAL ENCOUNTER (OUTPATIENT)
Age: 74
Discharge: HOME | End: 2019-01-04
Payer: MEDICARE

## 2019-01-02 DIAGNOSIS — J84.89 BOOP (BRONCHIOLITIS OBLITERANS WITH ORGANIZING PNEUMONIA) (HCC): ICD-10-CM

## 2019-01-02 PROCEDURE — 71046 X-RAY EXAM CHEST 2 VIEWS: CPT

## 2019-01-29 ENCOUNTER — INITIAL CONSULT (OUTPATIENT)
Dept: ONCOLOGY | Age: 74
End: 2019-01-29
Payer: MEDICARE

## 2019-01-29 ENCOUNTER — HOSPITAL ENCOUNTER (OUTPATIENT)
Age: 74
Discharge: HOME | End: 2019-01-29
Payer: MEDICARE

## 2019-01-29 VITALS
HEART RATE: 62 BPM | BODY MASS INDEX: 26.19 KG/M2 | DIASTOLIC BLOOD PRESSURE: 75 MMHG | TEMPERATURE: 97.4 F | HEIGHT: 71 IN | SYSTOLIC BLOOD PRESSURE: 130 MMHG | WEIGHT: 187.06 LBS

## 2019-01-29 DIAGNOSIS — R79.89 ELEVATED FERRITIN: Primary | ICD-10-CM

## 2019-01-29 DIAGNOSIS — D69.6 THROMBOCYTOPENIA (HCC): ICD-10-CM

## 2019-01-29 DIAGNOSIS — R79.89 ELEVATED FERRITIN: ICD-10-CM

## 2019-01-29 DIAGNOSIS — Z87.39 HX OF GOUT: ICD-10-CM

## 2019-01-29 DIAGNOSIS — N18.30 CKD (CHRONIC KIDNEY DISEASE) STAGE 3, GFR 30-59 ML/MIN (HCC): ICD-10-CM

## 2019-01-29 LAB
ABSOLUTE EOS #: 0.1 K/UL (ref 0–0.4)
ABSOLUTE IMMATURE GRANULOCYTE: ABNORMAL K/UL (ref 0–0.3)
ABSOLUTE LYMPH #: 1.3 K/UL (ref 1–4.8)
ABSOLUTE MONO #: 0.4 K/UL (ref 0.1–1.2)
ABSOLUTE RETIC #: 0.06 M/UL (ref 0.03–0.08)
BASOPHILS # BLD: 1 % (ref 0–2)
BASOPHILS ABSOLUTE: 0 K/UL (ref 0–0.2)
DIFFERENTIAL TYPE: ABNORMAL
EOSINOPHILS RELATIVE PERCENT: 3 % (ref 1–4)
FERRITIN: 1316 UG/L (ref 30–400)
HCT VFR BLD CALC: 45.5 % (ref 41–53)
HEMOGLOBIN: 14.9 G/DL (ref 13.5–17.5)
IMMATURE GRANULOCYTES: ABNORMAL %
IMMATURE RETIC FRACT: 12.7 % (ref 2.7–18.3)
IRON SATURATION: 32 % (ref 20–55)
IRON: 87 UG/DL (ref 59–158)
LYMPHOCYTES # BLD: 28 % (ref 24–44)
MCH RBC QN AUTO: 30.2 PG (ref 26–34)
MCHC RBC AUTO-ENTMCNC: 32.8 G/DL (ref 31–37)
MCV RBC AUTO: 92 FL (ref 80–100)
MONOCYTES # BLD: 8 % (ref 2–11)
NRBC AUTOMATED: ABNORMAL PER 100 WBC
PDW BLD-RTO: 14.5 % (ref 12.5–15.4)
PLATELET # BLD: 129 K/UL (ref 140–450)
PLATELET ESTIMATE: ABNORMAL
PMV BLD AUTO: 8.7 FL (ref 6–12)
RBC # BLD: 4.95 M/UL (ref 4.5–5.9)
RBC # BLD: ABNORMAL 10*6/UL
RETIC %: 1.2 % (ref 0.5–1.9)
RETIC HEMOGLOBIN: 35.6 PG (ref 28.2–35.7)
SEG NEUTROPHILS: 60 % (ref 36–66)
SEGMENTED NEUTROPHILS ABSOLUTE COUNT: 2.8 K/UL (ref 1.8–7.7)
TOTAL IRON BINDING CAPACITY: 272 UG/DL (ref 250–450)
UNSATURATED IRON BINDING CAPACITY: 185 UG/DL (ref 112–347)
URIC ACID: 9.1 MG/DL (ref 3.4–7)
WBC # BLD: 4.7 K/UL (ref 3.5–11)
WBC # BLD: ABNORMAL 10*3/UL

## 2019-01-29 PROCEDURE — 82728 ASSAY OF FERRITIN: CPT

## 2019-01-29 PROCEDURE — G8484 FLU IMMUNIZE NO ADMIN: HCPCS | Performed by: INTERNAL MEDICINE

## 2019-01-29 PROCEDURE — 84550 ASSAY OF BLOOD/URIC ACID: CPT

## 2019-01-29 PROCEDURE — 99201 HC NEW PT, E/M LEVEL 1: CPT | Performed by: INTERNAL MEDICINE

## 2019-01-29 PROCEDURE — 85025 COMPLETE CBC W/AUTO DIFF WBC: CPT

## 2019-01-29 PROCEDURE — 85045 AUTOMATED RETICULOCYTE COUNT: CPT

## 2019-01-29 PROCEDURE — 81256 HFE GENE: CPT

## 2019-01-29 PROCEDURE — 99205 OFFICE O/P NEW HI 60 MIN: CPT | Performed by: INTERNAL MEDICINE

## 2019-01-29 PROCEDURE — 1101F PT FALLS ASSESS-DOCD LE1/YR: CPT | Performed by: INTERNAL MEDICINE

## 2019-01-29 PROCEDURE — 36415 COLL VENOUS BLD VENIPUNCTURE: CPT

## 2019-01-29 PROCEDURE — 3017F COLORECTAL CA SCREEN DOC REV: CPT | Performed by: INTERNAL MEDICINE

## 2019-01-29 PROCEDURE — 83540 ASSAY OF IRON: CPT

## 2019-01-29 PROCEDURE — G8427 DOCREV CUR MEDS BY ELIG CLIN: HCPCS | Performed by: INTERNAL MEDICINE

## 2019-01-29 PROCEDURE — 83550 IRON BINDING TEST: CPT

## 2019-01-29 PROCEDURE — G8419 CALC BMI OUT NRM PARAM NOF/U: HCPCS | Performed by: INTERNAL MEDICINE

## 2019-01-29 RX ORDER — METOPROLOL SUCCINATE 50 MG/1
50 TABLET, EXTENDED RELEASE ORAL DAILY
Refills: 3 | COMMUNITY
Start: 2019-01-08

## 2019-01-29 RX ORDER — AMLODIPINE BESYLATE 5 MG/1
5 TABLET ORAL DAILY
Refills: 0 | COMMUNITY
Start: 2019-01-08

## 2019-02-12 ENCOUNTER — OFFICE VISIT (OUTPATIENT)
Dept: ONCOLOGY | Age: 74
End: 2019-02-12
Payer: MEDICARE

## 2019-02-12 ENCOUNTER — TELEPHONE (OUTPATIENT)
Dept: ONCOLOGY | Age: 74
End: 2019-02-12

## 2019-02-12 ENCOUNTER — HOSPITAL ENCOUNTER (OUTPATIENT)
Dept: INFUSION THERAPY | Age: 74
Discharge: HOME | End: 2019-02-12
Payer: MEDICARE

## 2019-02-12 VITALS
TEMPERATURE: 97.7 F | HEART RATE: 70 BPM | WEIGHT: 189.31 LBS | SYSTOLIC BLOOD PRESSURE: 129 MMHG | BODY MASS INDEX: 26.4 KG/M2 | DIASTOLIC BLOOD PRESSURE: 78 MMHG

## 2019-02-12 VITALS — HEART RATE: 67 BPM | SYSTOLIC BLOOD PRESSURE: 117 MMHG | DIASTOLIC BLOOD PRESSURE: 77 MMHG

## 2019-02-12 DIAGNOSIS — R79.89 ELEVATED FERRITIN: ICD-10-CM

## 2019-02-12 DIAGNOSIS — D69.6 THROMBOCYTOPENIA (HCC): ICD-10-CM

## 2019-02-12 DIAGNOSIS — Z87.39 HX OF GOUT: ICD-10-CM

## 2019-02-12 DIAGNOSIS — R79.89 ELEVATED FERRITIN: Primary | ICD-10-CM

## 2019-02-12 PROCEDURE — 99214 OFFICE O/P EST MOD 30 MIN: CPT | Performed by: INTERNAL MEDICINE

## 2019-02-12 PROCEDURE — G8419 CALC BMI OUT NRM PARAM NOF/U: HCPCS | Performed by: INTERNAL MEDICINE

## 2019-02-12 PROCEDURE — 1101F PT FALLS ASSESS-DOCD LE1/YR: CPT | Performed by: INTERNAL MEDICINE

## 2019-02-12 PROCEDURE — 1036F TOBACCO NON-USER: CPT | Performed by: INTERNAL MEDICINE

## 2019-02-12 PROCEDURE — G8427 DOCREV CUR MEDS BY ELIG CLIN: HCPCS | Performed by: INTERNAL MEDICINE

## 2019-02-12 PROCEDURE — 3017F COLORECTAL CA SCREEN DOC REV: CPT | Performed by: INTERNAL MEDICINE

## 2019-02-12 PROCEDURE — 4040F PNEUMOC VAC/ADMIN/RCVD: CPT | Performed by: INTERNAL MEDICINE

## 2019-02-12 PROCEDURE — 99195 PHLEBOTOMY: CPT

## 2019-02-12 PROCEDURE — 99211 OFF/OP EST MAY X REQ PHY/QHP: CPT | Performed by: INTERNAL MEDICINE

## 2019-02-12 PROCEDURE — G8484 FLU IMMUNIZE NO ADMIN: HCPCS | Performed by: INTERNAL MEDICINE

## 2019-02-12 PROCEDURE — 1123F ACP DISCUSS/DSCN MKR DOCD: CPT | Performed by: INTERNAL MEDICINE

## 2019-02-12 RX ORDER — 0.9 % SODIUM CHLORIDE 0.9 %
500 INTRAVENOUS SOLUTION INTRAVENOUS ONCE
Status: CANCELLED | OUTPATIENT
Start: 2019-02-12 | End: 2019-02-12

## 2019-02-12 RX ORDER — 0.9 % SODIUM CHLORIDE 0.9 %
250 INTRAVENOUS SOLUTION INTRAVENOUS ONCE
Status: CANCELLED | OUTPATIENT
Start: 2019-02-12 | End: 2019-02-12

## 2019-02-12 RX ORDER — PROBENECID 500 MG/1
500 TABLET, FILM COATED ORAL 2 TIMES DAILY
COMMUNITY
End: 2019-12-19 | Stop reason: ALTCHOICE

## 2019-02-12 NOTE — PROGRESS NOTES
Patient arrived for phlebotomy  Seen by Dr Rico Ochoa prior   Tolerated phleb without incident  Ambulated to exit instable condition   Return 3/12 phleb  Negrito Marcus RN

## 2019-02-26 ENCOUNTER — HOSPITAL ENCOUNTER (OUTPATIENT)
Dept: ULTRASOUND IMAGING | Age: 74
Discharge: HOME | End: 2019-02-28
Payer: MEDICARE

## 2019-02-26 DIAGNOSIS — D69.6 THROMBOCYTOPENIA (HCC): ICD-10-CM

## 2019-02-26 DIAGNOSIS — R79.89 ELEVATED FERRITIN: ICD-10-CM

## 2019-02-26 DIAGNOSIS — Z87.39 HX OF GOUT: ICD-10-CM

## 2019-02-26 PROCEDURE — 76705 ECHO EXAM OF ABDOMEN: CPT

## 2019-03-20 ENCOUNTER — HOSPITAL ENCOUNTER (OUTPATIENT)
Age: 74
Discharge: HOME | End: 2019-03-20
Payer: MEDICARE

## 2019-03-20 LAB
ALT SERPL-CCNC: 28 U/L (ref 5–41)
CHOLESTEROL/HDL RATIO: 6.2
CHOLESTEROL: 161 MG/DL
GLUCOSE FASTING: 108 MG/DL (ref 70–99)
HDLC SERPL-MCNC: 26 MG/DL
HEPATITIS C ANTIBODY: NONREACTIVE
LDL CHOLESTEROL: 95 MG/DL (ref 0–130)
TRIGL SERPL-MCNC: 199 MG/DL
VLDLC SERPL CALC-MCNC: ABNORMAL MG/DL (ref 1–30)

## 2019-03-20 PROCEDURE — 80061 LIPID PANEL: CPT

## 2019-03-20 PROCEDURE — 36415 COLL VENOUS BLD VENIPUNCTURE: CPT

## 2019-03-20 PROCEDURE — 84460 ALANINE AMINO (ALT) (SGPT): CPT

## 2019-03-20 PROCEDURE — 82947 ASSAY GLUCOSE BLOOD QUANT: CPT

## 2019-03-20 PROCEDURE — 86803 HEPATITIS C AB TEST: CPT

## 2019-03-28 ENCOUNTER — HOSPITAL ENCOUNTER (OUTPATIENT)
Age: 74
Discharge: HOME | End: 2019-03-28
Payer: MEDICARE

## 2019-03-28 DIAGNOSIS — R79.89 ELEVATED FERRITIN: ICD-10-CM

## 2019-03-28 DIAGNOSIS — Z87.39 HX OF GOUT: ICD-10-CM

## 2019-03-28 DIAGNOSIS — D69.6 THROMBOCYTOPENIA (HCC): ICD-10-CM

## 2019-03-28 LAB
ABSOLUTE EOS #: 0.1 K/UL (ref 0–0.4)
ABSOLUTE IMMATURE GRANULOCYTE: ABNORMAL K/UL (ref 0–0.3)
ABSOLUTE LYMPH #: 0.7 K/UL (ref 1–4.8)
ABSOLUTE MONO #: 0.3 K/UL (ref 0.1–1.2)
ALBUMIN SERPL-MCNC: 3.8 G/DL (ref 3.5–5.2)
ALBUMIN/GLOBULIN RATIO: 1.2 (ref 1–2.5)
ALP BLD-CCNC: 114 U/L (ref 40–129)
ALT SERPL-CCNC: 22 U/L (ref 5–41)
ANION GAP SERPL CALCULATED.3IONS-SCNC: 8 MMOL/L (ref 9–17)
AST SERPL-CCNC: 31 U/L
BASOPHILS # BLD: 1 % (ref 0–2)
BASOPHILS ABSOLUTE: 0 K/UL (ref 0–0.2)
BILIRUB SERPL-MCNC: 0.37 MG/DL (ref 0.3–1.2)
BUN BLDV-MCNC: 33 MG/DL (ref 8–23)
BUN/CREAT BLD: ABNORMAL (ref 9–20)
CALCIUM SERPL-MCNC: 9.3 MG/DL (ref 8.6–10.4)
CHLORIDE BLD-SCNC: 107 MMOL/L (ref 98–107)
CO2: 24 MMOL/L (ref 20–31)
CREAT SERPL-MCNC: 1.59 MG/DL (ref 0.7–1.2)
DIFFERENTIAL TYPE: ABNORMAL
EOSINOPHILS RELATIVE PERCENT: 3 % (ref 1–4)
FERRITIN: 827 UG/L (ref 30–400)
GFR AFRICAN AMERICAN: 52 ML/MIN
GFR NON-AFRICAN AMERICAN: 43 ML/MIN
GFR SERPL CREATININE-BSD FRML MDRD: ABNORMAL ML/MIN/{1.73_M2}
GFR SERPL CREATININE-BSD FRML MDRD: ABNORMAL ML/MIN/{1.73_M2}
GLUCOSE BLD-MCNC: 107 MG/DL (ref 70–99)
HCT VFR BLD CALC: 37.2 % (ref 41–53)
HEMOGLOBIN: 12.9 G/DL (ref 13.5–17.5)
IMMATURE GRANULOCYTES: ABNORMAL %
LYMPHOCYTES # BLD: 21 % (ref 24–44)
MCH RBC QN AUTO: 32 PG (ref 26–34)
MCHC RBC AUTO-ENTMCNC: 34.6 G/DL (ref 31–37)
MCV RBC AUTO: 92.5 FL (ref 80–100)
MONOCYTES # BLD: 7 % (ref 2–11)
NRBC AUTOMATED: ABNORMAL PER 100 WBC
PDW BLD-RTO: 15 % (ref 12.5–15.4)
PLATELET # BLD: 100 K/UL (ref 140–450)
PLATELET ESTIMATE: ABNORMAL
PMV BLD AUTO: 7.8 FL (ref 6–12)
POTASSIUM SERPL-SCNC: 4.7 MMOL/L (ref 3.7–5.3)
RBC # BLD: 4.02 M/UL (ref 4.5–5.9)
RBC # BLD: ABNORMAL 10*6/UL
SEG NEUTROPHILS: 68 % (ref 36–66)
SEGMENTED NEUTROPHILS ABSOLUTE COUNT: 2.4 K/UL (ref 1.8–7.7)
SODIUM BLD-SCNC: 139 MMOL/L (ref 135–144)
TOTAL PROTEIN: 7 G/DL (ref 6.4–8.3)
URIC ACID: 6.8 MG/DL (ref 3.4–7)
WBC # BLD: 3.5 K/UL (ref 3.5–11)
WBC # BLD: ABNORMAL 10*3/UL

## 2019-03-28 PROCEDURE — 80053 COMPREHEN METABOLIC PANEL: CPT

## 2019-03-28 PROCEDURE — 84550 ASSAY OF BLOOD/URIC ACID: CPT

## 2019-03-28 PROCEDURE — 85025 COMPLETE CBC W/AUTO DIFF WBC: CPT

## 2019-03-28 PROCEDURE — 82728 ASSAY OF FERRITIN: CPT

## 2019-03-28 PROCEDURE — 36415 COLL VENOUS BLD VENIPUNCTURE: CPT

## 2019-04-03 ENCOUNTER — HOSPITAL ENCOUNTER (OUTPATIENT)
Dept: VASCULAR LAB | Facility: CLINIC | Age: 74
Discharge: HOME | End: 2019-04-03
Payer: MEDICARE

## 2019-04-03 PROCEDURE — 93978 VASCULAR STUDY: CPT

## 2019-04-25 ENCOUNTER — OFFICE VISIT (OUTPATIENT)
Dept: ONCOLOGY | Age: 74
End: 2019-04-25
Payer: MEDICARE

## 2019-04-25 VITALS
BODY MASS INDEX: 27.16 KG/M2 | TEMPERATURE: 97.8 F | DIASTOLIC BLOOD PRESSURE: 66 MMHG | SYSTOLIC BLOOD PRESSURE: 121 MMHG | WEIGHT: 194.7 LBS | HEART RATE: 66 BPM

## 2019-04-25 DIAGNOSIS — N18.30 CKD (CHRONIC KIDNEY DISEASE) STAGE 3, GFR 30-59 ML/MIN (HCC): Primary | ICD-10-CM

## 2019-04-25 DIAGNOSIS — R79.89 ELEVATED FERRITIN: ICD-10-CM

## 2019-04-25 DIAGNOSIS — D69.6 THROMBOCYTOPENIA (HCC): ICD-10-CM

## 2019-04-25 DIAGNOSIS — M1A.09X0 IDIOPATHIC CHRONIC GOUT OF MULTIPLE SITES WITHOUT TOPHUS: ICD-10-CM

## 2019-04-25 PROCEDURE — 1036F TOBACCO NON-USER: CPT | Performed by: INTERNAL MEDICINE

## 2019-04-25 PROCEDURE — G8427 DOCREV CUR MEDS BY ELIG CLIN: HCPCS | Performed by: INTERNAL MEDICINE

## 2019-04-25 PROCEDURE — 1123F ACP DISCUSS/DSCN MKR DOCD: CPT | Performed by: INTERNAL MEDICINE

## 2019-04-25 PROCEDURE — G8419 CALC BMI OUT NRM PARAM NOF/U: HCPCS | Performed by: INTERNAL MEDICINE

## 2019-04-25 PROCEDURE — 4040F PNEUMOC VAC/ADMIN/RCVD: CPT | Performed by: INTERNAL MEDICINE

## 2019-04-25 PROCEDURE — 99214 OFFICE O/P EST MOD 30 MIN: CPT | Performed by: INTERNAL MEDICINE

## 2019-04-25 PROCEDURE — 3017F COLORECTAL CA SCREEN DOC REV: CPT | Performed by: INTERNAL MEDICINE

## 2019-04-25 NOTE — PROGRESS NOTES
DIAGNOSIS:   1. 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 Anemia and thrombocytopenia. Concerning features - due to splenomegaly. 3. Past gouty arthritis with multiple subcutaneous nodules    CURRENT THERAPY:  Plan for therapeutic phlebotomy  Started on Probenicid     BRIEF CASE HISTORY:  Joceline Marti is a very pleasant 74 y.o. male who is referred to us for elevated ferritin. He has history of CKD, stable. He has history of gout, gouty arthritis. He consumes alcohol rarely. CBC also showed anemia and thrombocytopenia. Follow up testing showed recovery of his hemoglobin but his platelets remain low. Work up done 01/29/2018 showed continued elevated ferritin and heterozygous mutation for H63D, his uric acid was elevated. He was started Probenecid. Plan for phlebotomy - good response. INTERIM HISTORY: The patient presents to review for elevated ferritin. He feels well overall and denies pain, dizziness, nausea, vomiting. His gout persists. PAST MEDICAL HISTORY: has a past medical history of Acute renal failure ([facility] [address] Anemia, CKD (chronic kidney disease) stage 3, GFR 30-59 ml/min (HCC), Depression, HTN (hypertension), Silicosis ([facility] [address] Skin cancer, and Unspecified hypertensive kidney disease with chronic kidney disease stage I through stage IV, or unspecified(403.90). PAST SURGICAL HISTORY: has a past surgical history that includes Rotator cuff repair; Lung biopsy; Colonoscopy (1/13/2012); and Colonoscopy (3/30/2011). CURRENT MEDICATIONS:  has a current medication list which includes the following prescription(s): probenecid, amlodipine, and metoprolol succinate. ALLERGIES:  is allergic to latex; heparin; and lovenox [enoxaparin sodium]. FAMILY HISTORY: Negative for any hematological or oncological conditions. SOCIAL HISTORY:  reports that he has quit smoking. He has never used smokeless tobacco. He reports that he drinks alcohol.  He reports that he does not use drugs. REVIEW OF SYSTEMS:   General: No fever or night sweats. Weight is stable. ENT: No double or blurred vision, no tinnitus or hearing problem, no dysphagia or sore throat   Respiratory: No chest pain, no shortness of breath, no cough or hemoptysis. Cardiovascular: Denies chest pain, PND or orthopnea. No L E swelling or palpitations. Gastrointestinal: No nausea or vomiting, abdominal pain, diarrhea or constipation. Genitourinary: Denies dysuria, hematuria, frequency, urgency or incontinence. Neurological: Denies headaches, decreased LOC, no sensory or motor focal deficits. Musculoskeletal: No arthralgia no back pain or joint swelling. Gouty arthritis with multiple nodules  Skin: There are no rashes or bleeding. Psychiatric: No anxiety, no depression. Endocrine: No diabetes or thyroid disease. Hematologic: No bleeding, no adenopathy. PHYSICAL EXAM: Shows a well appearing 74y.o.-year-old male who is not in pain or distress. Vital Signs: Weight 194 lb 11.2 oz (88.3 kg). HEENT: Normocephalic and atraumatic. Pupils are equal, round, reactive to light and accommodation. Extraocular muscles are intact. Neck: Showed no JVD, no carotid bruit . Lungs: Clear to auscultation bilaterally. Heart: Regular without any murmur. Abdomen: Soft, nontender. No hepatosplenomegaly. Extremities: Gouty arthritis on the hands with multiple nodules. Lower extremities show no edema, clubbing, or cyanosis.   Neuro exam: intact cranial nerves bilaterally no motor or sensory deficit, gait is normal. Lymphatic: no adenopathy appreciated in the supraclavicular, axillary, cervical or inguinal area    REVIEW OF LABORATORY DATA:   Lab Results   Component Value Date    WBC 3.5 03/28/2019    HGB 12.9 (L) 03/28/2019    HCT 37.2 (L) 03/28/2019    MCV 92.5 03/28/2019     (L) 03/28/2019       Chemistry        Component Value Date/Time     03/28/2019 0824    K 4.7 03/28/2019 0824     03/28/2019 0824    CO2 24 03/28/2019 0824    BUN 33 (H) 03/28/2019 0824    CREATININE 1.59 (H) 03/28/2019 0824        Component Value Date/Time    CALCIUM 9.3 03/28/2019 0824    ALKPHOS 114 03/28/2019 0824    AST 31 03/28/2019 0824    ALT 22 03/28/2019 0824    BILITOT 0.37 03/28/2019 0824        Lab Results   Component Value Date    IRON 87 01/29/2019    TIBC 272 01/29/2019    FERRITIN 827 (H) 03/28/2019     Results for Senora Furry \"VAL\" (MRN [id number]) as of 4/25/2019 15:44   Ref. Range 1/29/2019 14:46 3/20/2019 07:41 3/28/2019 08:24   Uric Acid Latest Ref Range: 3.4 - 7.0 mg/dL 9.1 (H)  6.8         REVIEW OF RADIOLOGICAL RESULTS:     IMPRESSION:   1. Elevated ferritin- plan for therapeutic phlebotomy  2. Thrombocytopenia. 3. Hx of normocytic anemia, resolved  4. HX gout, CKD  5. 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 Elevated uric acid - started on Probenecid 02/2019    PLAN:   1. We reviewed his lab work and his ferritin remains elevated but has decreased significantly. 2. His gouty arthritis persists, I recommend he consult with rheumatology - he has appointment scheduled in the fall. 3. Continue with phlebotomy and surveillance. 4. Return in 7 months.

## 2019-11-25 ENCOUNTER — TELEPHONE (OUTPATIENT)
Dept: ONCOLOGY | Age: 74
End: 2019-11-25

## 2019-11-26 ENCOUNTER — HOSPITAL ENCOUNTER (OUTPATIENT)
Age: 74
Discharge: HOME | End: 2019-11-26
Payer: MEDICARE

## 2019-11-26 DIAGNOSIS — N18.30 CKD (CHRONIC KIDNEY DISEASE) STAGE 3, GFR 30-59 ML/MIN (HCC): ICD-10-CM

## 2019-11-26 DIAGNOSIS — M1A.09X0 IDIOPATHIC CHRONIC GOUT OF MULTIPLE SITES WITHOUT TOPHUS: ICD-10-CM

## 2019-11-26 LAB
C-REACTIVE PROTEIN: 5 MG/L (ref 0–5)
FERRITIN: 730 UG/L (ref 30–400)
SEDIMENTATION RATE, ERYTHROCYTE: 27 MM (ref 0–15)
URIC ACID: 9.4 MG/DL (ref 3.4–7)

## 2019-11-26 PROCEDURE — 84550 ASSAY OF BLOOD/URIC ACID: CPT

## 2019-11-26 PROCEDURE — 36415 COLL VENOUS BLD VENIPUNCTURE: CPT

## 2019-11-26 PROCEDURE — 86140 C-REACTIVE PROTEIN: CPT

## 2019-11-26 PROCEDURE — 82728 ASSAY OF FERRITIN: CPT

## 2019-11-26 PROCEDURE — 85651 RBC SED RATE NONAUTOMATED: CPT

## 2019-12-19 ENCOUNTER — OFFICE VISIT (OUTPATIENT)
Dept: ONCOLOGY | Age: 74
End: 2019-12-19
Payer: MEDICARE

## 2019-12-19 ENCOUNTER — TELEPHONE (OUTPATIENT)
Dept: ONCOLOGY | Age: 74
End: 2019-12-19

## 2019-12-19 ENCOUNTER — HOSPITAL ENCOUNTER (OUTPATIENT)
Age: 74
Discharge: HOME | End: 2019-12-19
Payer: MEDICARE

## 2019-12-19 VITALS
TEMPERATURE: 97.3 F | SYSTOLIC BLOOD PRESSURE: 139 MMHG | WEIGHT: 189.2 LBS | HEART RATE: 69 BPM | BODY MASS INDEX: 26.39 KG/M2 | DIASTOLIC BLOOD PRESSURE: 70 MMHG

## 2019-12-19 DIAGNOSIS — R79.89 ELEVATED FERRITIN: ICD-10-CM

## 2019-12-19 DIAGNOSIS — D69.6 THROMBOCYTOPENIA (HCC): ICD-10-CM

## 2019-12-19 DIAGNOSIS — Z87.39 HX OF GOUT: ICD-10-CM

## 2019-12-19 LAB
ABSOLUTE EOS #: 0.1 K/UL (ref 0–0.4)
ABSOLUTE IMMATURE GRANULOCYTE: ABNORMAL K/UL (ref 0–0.3)
ABSOLUTE LYMPH #: 0.9 K/UL (ref 1–4.8)
ABSOLUTE MONO #: 0.4 K/UL (ref 0.1–1.2)
ALBUMIN SERPL-MCNC: 4.2 G/DL (ref 3.5–5.2)
ALBUMIN/GLOBULIN RATIO: 1.4 (ref 1–2.5)
ALP BLD-CCNC: 102 U/L (ref 40–129)
ALT SERPL-CCNC: 31 U/L (ref 5–41)
ANION GAP SERPL CALCULATED.3IONS-SCNC: 10 MMOL/L (ref 9–17)
AST SERPL-CCNC: 39 U/L
BASOPHILS # BLD: 1 % (ref 0–2)
BASOPHILS ABSOLUTE: 0 K/UL (ref 0–0.2)
BILIRUB SERPL-MCNC: 0.39 MG/DL (ref 0.3–1.2)
BUN BLDV-MCNC: 41 MG/DL (ref 8–23)
BUN/CREAT BLD: ABNORMAL (ref 9–20)
CALCIUM SERPL-MCNC: 9 MG/DL (ref 8.6–10.4)
CHLORIDE BLD-SCNC: 106 MMOL/L (ref 98–107)
CO2: 23 MMOL/L (ref 20–31)
CREAT SERPL-MCNC: 1.77 MG/DL (ref 0.7–1.2)
DIFFERENTIAL TYPE: ABNORMAL
EOSINOPHILS RELATIVE PERCENT: 3 % (ref 1–4)
GFR AFRICAN AMERICAN: 46 ML/MIN
GFR NON-AFRICAN AMERICAN: 38 ML/MIN
GFR SERPL CREATININE-BSD FRML MDRD: ABNORMAL ML/MIN/{1.73_M2}
GFR SERPL CREATININE-BSD FRML MDRD: ABNORMAL ML/MIN/{1.73_M2}
GLUCOSE BLD-MCNC: 131 MG/DL (ref 70–99)
HCT VFR BLD CALC: 43 % (ref 41–53)
HEMOGLOBIN: 14.1 G/DL (ref 13.5–17.5)
IMMATURE GRANULOCYTES: ABNORMAL %
LYMPHOCYTES # BLD: 19 % (ref 24–44)
MCH RBC QN AUTO: 29.9 PG (ref 26–34)
MCHC RBC AUTO-ENTMCNC: 32.8 G/DL (ref 31–37)
MCV RBC AUTO: 91.1 FL (ref 80–100)
MONOCYTES # BLD: 9 % (ref 2–11)
NRBC AUTOMATED: ABNORMAL PER 100 WBC
PDW BLD-RTO: 14.4 % (ref 12.5–15.4)
PLATELET # BLD: 104 K/UL (ref 140–450)
PLATELET ESTIMATE: ABNORMAL
PMV BLD AUTO: 8.5 FL (ref 6–12)
POTASSIUM SERPL-SCNC: 5.4 MMOL/L (ref 3.7–5.3)
RBC # BLD: 4.72 M/UL (ref 4.5–5.9)
RBC # BLD: ABNORMAL 10*6/UL
SEG NEUTROPHILS: 68 % (ref 36–66)
SEGMENTED NEUTROPHILS ABSOLUTE COUNT: 3.1 K/UL (ref 1.8–7.7)
SODIUM BLD-SCNC: 139 MMOL/L (ref 135–144)
TOTAL PROTEIN: 7.3 G/DL (ref 6.4–8.3)
WBC # BLD: 4.5 K/UL (ref 3.5–11)
WBC # BLD: ABNORMAL 10*3/UL

## 2019-12-19 PROCEDURE — 1123F ACP DISCUSS/DSCN MKR DOCD: CPT | Performed by: INTERNAL MEDICINE

## 2019-12-19 PROCEDURE — 85025 COMPLETE CBC W/AUTO DIFF WBC: CPT

## 2019-12-19 PROCEDURE — G8427 DOCREV CUR MEDS BY ELIG CLIN: HCPCS | Performed by: INTERNAL MEDICINE

## 2019-12-19 PROCEDURE — G8417 CALC BMI ABV UP PARAM F/U: HCPCS | Performed by: INTERNAL MEDICINE

## 2019-12-19 PROCEDURE — 36415 COLL VENOUS BLD VENIPUNCTURE: CPT

## 2019-12-19 PROCEDURE — 4040F PNEUMOC VAC/ADMIN/RCVD: CPT | Performed by: INTERNAL MEDICINE

## 2019-12-19 PROCEDURE — 99214 OFFICE O/P EST MOD 30 MIN: CPT | Performed by: INTERNAL MEDICINE

## 2019-12-19 PROCEDURE — G8484 FLU IMMUNIZE NO ADMIN: HCPCS | Performed by: INTERNAL MEDICINE

## 2019-12-19 PROCEDURE — 80053 COMPREHEN METABOLIC PANEL: CPT

## 2019-12-19 PROCEDURE — 99211 OFF/OP EST MAY X REQ PHY/QHP: CPT | Performed by: INTERNAL MEDICINE

## 2019-12-19 PROCEDURE — 1036F TOBACCO NON-USER: CPT | Performed by: INTERNAL MEDICINE

## 2019-12-31 ENCOUNTER — HOSPITAL ENCOUNTER (OUTPATIENT)
Age: 74
Discharge: HOME | End: 2020-01-02
Payer: MEDICARE

## 2019-12-31 ENCOUNTER — HOSPITAL ENCOUNTER (OUTPATIENT)
Dept: GENERAL RADIOLOGY | Age: 74
Discharge: HOME | End: 2020-01-02
Payer: MEDICARE

## 2019-12-31 PROCEDURE — 71046 X-RAY EXAM CHEST 2 VIEWS: CPT

## 2020-02-04 ENCOUNTER — ANESTHESIA EVENT (OUTPATIENT)
Dept: OPERATING ROOM | Age: 75
End: 2020-02-04
Payer: MEDICARE

## 2020-02-04 ENCOUNTER — HOSPITAL ENCOUNTER (OUTPATIENT)
Age: 75
Setting detail: OUTPATIENT SURGERY
Discharge: HOME | End: 2020-02-04
Attending: OPHTHALMOLOGY | Admitting: OPHTHALMOLOGY
Payer: MEDICARE

## 2020-02-04 ENCOUNTER — ANESTHESIA (OUTPATIENT)
Dept: OPERATING ROOM | Age: 75
End: 2020-02-04
Payer: MEDICARE

## 2020-02-04 VITALS
WEIGHT: 189 LBS | SYSTOLIC BLOOD PRESSURE: 110 MMHG | DIASTOLIC BLOOD PRESSURE: 64 MMHG | HEART RATE: 62 BPM | OXYGEN SATURATION: 100 % | HEIGHT: 71 IN | RESPIRATION RATE: 10 BRPM | BODY MASS INDEX: 26.46 KG/M2 | TEMPERATURE: 97.3 F

## 2020-02-04 VITALS
DIASTOLIC BLOOD PRESSURE: 63 MMHG | OXYGEN SATURATION: 100 % | RESPIRATION RATE: 16 BRPM | SYSTOLIC BLOOD PRESSURE: 117 MMHG

## 2020-02-04 PROBLEM — H25.11 AGE-RELATED NUCLEAR CATARACT, RIGHT: Status: RESOLVED | Noted: 2020-02-04 | Resolved: 2020-02-04

## 2020-02-04 PROCEDURE — 7100000001 HC PACU RECOVERY - ADDTL 15 MIN: Performed by: OPHTHALMOLOGY

## 2020-02-04 PROCEDURE — 6360000002 HC RX W HCPCS: Performed by: OPHTHALMOLOGY

## 2020-02-04 PROCEDURE — 2709999900 HC NON-CHARGEABLE SUPPLY: Performed by: OPHTHALMOLOGY

## 2020-02-04 PROCEDURE — 7100000000 HC PACU RECOVERY - FIRST 15 MIN: Performed by: OPHTHALMOLOGY

## 2020-02-04 PROCEDURE — 6370000000 HC RX 637 (ALT 250 FOR IP): Performed by: OPHTHALMOLOGY

## 2020-02-04 PROCEDURE — 2500000003 HC RX 250 WO HCPCS: Performed by: OPHTHALMOLOGY

## 2020-02-04 PROCEDURE — V2632 POST CHMBR INTRAOCULAR LENS: HCPCS | Performed by: OPHTHALMOLOGY

## 2020-02-04 PROCEDURE — 2580000003 HC RX 258: Performed by: OPHTHALMOLOGY

## 2020-02-04 PROCEDURE — 3700000000 HC ANESTHESIA ATTENDED CARE: Performed by: OPHTHALMOLOGY

## 2020-02-04 PROCEDURE — 6360000002 HC RX W HCPCS: Performed by: NURSE ANESTHETIST, CERTIFIED REGISTERED

## 2020-02-04 PROCEDURE — 3600000002 HC SURGERY LEVEL 2 BASE: Performed by: OPHTHALMOLOGY

## 2020-02-04 PROCEDURE — 3700000001 HC ADD 15 MINUTES (ANESTHESIA): Performed by: OPHTHALMOLOGY

## 2020-02-04 PROCEDURE — 3600000012 HC SURGERY LEVEL 2 ADDTL 15MIN: Performed by: OPHTHALMOLOGY

## 2020-02-04 DEVICE — LENS IOL SN60WF 19.5D: Type: IMPLANTABLE DEVICE | Site: EYE | Status: FUNCTIONAL

## 2020-02-04 RX ORDER — FENTANYL CITRATE 50 UG/ML
INJECTION, SOLUTION INTRAMUSCULAR; INTRAVENOUS PRN
Status: DISCONTINUED | OUTPATIENT
Start: 2020-02-04 | End: 2020-02-04 | Stop reason: SDUPTHER

## 2020-02-04 RX ORDER — 0.9 % SODIUM CHLORIDE 0.9 %
500 INTRAVENOUS SOLUTION INTRAVENOUS
Status: DISCONTINUED | OUTPATIENT
Start: 2020-02-04 | End: 2020-02-04 | Stop reason: HOSPADM

## 2020-02-04 RX ORDER — HYDRALAZINE HYDROCHLORIDE 20 MG/ML
5 INJECTION INTRAMUSCULAR; INTRAVENOUS EVERY 10 MIN PRN
Status: DISCONTINUED | OUTPATIENT
Start: 2020-02-04 | End: 2020-02-04 | Stop reason: HOSPADM

## 2020-02-04 RX ORDER — MIDAZOLAM HYDROCHLORIDE 1 MG/ML
INJECTION INTRAMUSCULAR; INTRAVENOUS PRN
Status: DISCONTINUED | OUTPATIENT
Start: 2020-02-04 | End: 2020-02-04 | Stop reason: SDUPTHER

## 2020-02-04 RX ORDER — CYCLOPENTOLATE HYDROCHLORIDE 10 MG/ML
1 SOLUTION/ DROPS OPHTHALMIC EVERY 5 MIN PRN
Status: COMPLETED | OUTPATIENT
Start: 2020-02-04 | End: 2020-02-04

## 2020-02-04 RX ORDER — LIDOCAINE HYDROCHLORIDE 10 MG/ML
INJECTION, SOLUTION INFILTRATION; PERINEURAL PRN
Status: DISCONTINUED | OUTPATIENT
Start: 2020-02-04 | End: 2020-02-04 | Stop reason: ALTCHOICE

## 2020-02-04 RX ORDER — TIMOLOL MALEATE 5 MG/ML
SOLUTION/ DROPS OPHTHALMIC PRN
Status: DISCONTINUED | OUTPATIENT
Start: 2020-02-04 | End: 2020-02-04 | Stop reason: ALTCHOICE

## 2020-02-04 RX ORDER — PHENYLEPHRINE HCL 2.5 %
1 DROPS OPHTHALMIC (EYE) EVERY 5 MIN PRN
Status: COMPLETED | OUTPATIENT
Start: 2020-02-04 | End: 2020-02-04

## 2020-02-04 RX ORDER — ONDANSETRON 2 MG/ML
4 INJECTION INTRAMUSCULAR; INTRAVENOUS
Status: DISCONTINUED | OUTPATIENT
Start: 2020-02-04 | End: 2020-02-04 | Stop reason: HOSPADM

## 2020-02-04 RX ORDER — KETOROLAC TROMETHAMINE 5 MG/ML
1 SOLUTION OPHTHALMIC EVERY 5 MIN PRN
Status: COMPLETED | OUTPATIENT
Start: 2020-02-04 | End: 2020-02-04

## 2020-02-04 RX ORDER — SODIUM CHLORIDE, SODIUM LACTATE, POTASSIUM CHLORIDE, CALCIUM CHLORIDE 600; 310; 30; 20 MG/100ML; MG/100ML; MG/100ML; MG/100ML
INJECTION, SOLUTION INTRAVENOUS CONTINUOUS
Status: DISCONTINUED | OUTPATIENT
Start: 2020-02-04 | End: 2020-02-04 | Stop reason: HOSPADM

## 2020-02-04 RX ORDER — LORAZEPAM 1 MG/1
1 TABLET ORAL ONCE
Status: DISCONTINUED | OUTPATIENT
Start: 2020-02-04 | End: 2020-02-04 | Stop reason: HOSPADM

## 2020-02-04 RX ORDER — TOBRAMYCIN 3 MG/ML
SOLUTION/ DROPS OPHTHALMIC PRN
Status: DISCONTINUED | OUTPATIENT
Start: 2020-02-04 | End: 2020-02-04 | Stop reason: ALTCHOICE

## 2020-02-04 RX ORDER — TOBRAMYCIN 3 MG/ML
1 SOLUTION/ DROPS OPHTHALMIC EVERY 5 MIN PRN
Status: DISCONTINUED | OUTPATIENT
Start: 2020-02-04 | End: 2020-02-04 | Stop reason: HOSPADM

## 2020-02-04 RX ORDER — DIPHENHYDRAMINE HYDROCHLORIDE 50 MG/ML
12.5 INJECTION INTRAMUSCULAR; INTRAVENOUS
Status: DISCONTINUED | OUTPATIENT
Start: 2020-02-04 | End: 2020-02-04 | Stop reason: HOSPADM

## 2020-02-04 RX ORDER — BUPIVACAINE HYDROCHLORIDE 5 MG/ML
1 INJECTION, SOLUTION EPIDURAL; INTRACAUDAL SEE ADMIN INSTRUCTIONS
Status: DISCONTINUED | OUTPATIENT
Start: 2020-02-04 | End: 2020-02-04 | Stop reason: HOSPADM

## 2020-02-04 RX ORDER — LABETALOL 20 MG/4 ML (5 MG/ML) INTRAVENOUS SYRINGE
5 EVERY 10 MIN PRN
Status: DISCONTINUED | OUTPATIENT
Start: 2020-02-04 | End: 2020-02-04 | Stop reason: HOSPADM

## 2020-02-04 RX ORDER — BALANCED SALT SOLUTION ENRICHED WITH BICARBONATE, DEXTROSE, AND GLUTATHIONE
KIT INTRAOCULAR PRN
Status: DISCONTINUED | OUTPATIENT
Start: 2020-02-04 | End: 2020-02-04 | Stop reason: ALTCHOICE

## 2020-02-04 RX ORDER — PROMETHAZINE HYDROCHLORIDE 25 MG/ML
6.25 INJECTION, SOLUTION INTRAMUSCULAR; INTRAVENOUS
Status: DISCONTINUED | OUTPATIENT
Start: 2020-02-04 | End: 2020-02-04 | Stop reason: CLARIF

## 2020-02-04 RX ORDER — BUPIVACAINE HYDROCHLORIDE 5 MG/ML
INJECTION, SOLUTION EPIDURAL; INTRACAUDAL PRN
Status: DISCONTINUED | OUTPATIENT
Start: 2020-02-04 | End: 2020-02-04 | Stop reason: ALTCHOICE

## 2020-02-04 RX ADMIN — CYCLOPENTOLATE HYDROCHLORIDE 1 DROP: 10 SOLUTION/ DROPS OPHTHALMIC at 08:18

## 2020-02-04 RX ADMIN — TOBRAMYCIN 1 DROP: 3 SOLUTION OPHTHALMIC at 08:06

## 2020-02-04 RX ADMIN — FENTANYL CITRATE 50 MCG: 50 INJECTION, SOLUTION INTRAMUSCULAR; INTRAVENOUS at 09:36

## 2020-02-04 RX ADMIN — KETOROLAC TROMETHAMINE 1 DROP: 5 SOLUTION OPHTHALMIC at 08:06

## 2020-02-04 RX ADMIN — PHENYLEPHRINE HYDROCHLORIDE 1 DROP: 25 SOLUTION/ DROPS OPHTHALMIC at 08:06

## 2020-02-04 RX ADMIN — TOBRAMYCIN 1 DROP: 3 SOLUTION OPHTHALMIC at 08:19

## 2020-02-04 RX ADMIN — FENTANYL CITRATE 50 MCG: 50 INJECTION, SOLUTION INTRAMUSCULAR; INTRAVENOUS at 09:38

## 2020-02-04 RX ADMIN — CYCLOPENTOLATE HYDROCHLORIDE 1 DROP: 10 SOLUTION/ DROPS OPHTHALMIC at 08:07

## 2020-02-04 RX ADMIN — MIDAZOLAM 2 MG: 1 INJECTION INTRAMUSCULAR; INTRAVENOUS at 09:32

## 2020-02-04 RX ADMIN — KETOROLAC TROMETHAMINE 1 DROP: 5 SOLUTION OPHTHALMIC at 08:18

## 2020-02-04 RX ADMIN — SODIUM CHLORIDE, POTASSIUM CHLORIDE, SODIUM LACTATE AND CALCIUM CHLORIDE: 600; 310; 30; 20 INJECTION, SOLUTION INTRAVENOUS at 08:17

## 2020-02-04 RX ADMIN — BUPIVACAINE HYDROCHLORIDE 5 MG: 5 INJECTION, SOLUTION EPIDURAL; INTRACAUDAL; PERINEURAL at 08:06

## 2020-02-04 RX ADMIN — BUPIVACAINE HYDROCHLORIDE 5 MG: 5 INJECTION, SOLUTION EPIDURAL; INTRACAUDAL; PERINEURAL at 08:19

## 2020-02-04 RX ADMIN — PHENYLEPHRINE HYDROCHLORIDE 1 DROP: 25 SOLUTION/ DROPS OPHTHALMIC at 08:18

## 2020-02-04 ASSESSMENT — PULMONARY FUNCTION TESTS
PIF_VALUE: 0
PIF_VALUE: 1
PIF_VALUE: 0

## 2020-02-04 ASSESSMENT — PAIN SCALES - GENERAL
PAINLEVEL_OUTOF10: 0

## 2020-02-04 ASSESSMENT — PAIN - FUNCTIONAL ASSESSMENT: PAIN_FUNCTIONAL_ASSESSMENT: 0-10

## 2020-02-04 NOTE — H&P
Parisa Rocha is a 66y.o. year old who presents for elective outpatient ophthalmic surgery with Greyson Cameron. The patient complains of decreased vision, glare and halos around lights, and trouble with vision for activities of daily living.       Past Medical History:   Diagnosis Date    Acute renal failure (HCC)     Anemia     CKD (chronic kidney disease) stage 3, GFR 30-59 ml/min (HCC)     Depression     HTN (hypertension)     Silicosis ([address]     Skin cancer     Unspecified hypertensive kidney disease with chronic kidney disease stage I through stage IV, or unspecified(403.90) 4/15/2015       Past Surgical History:   Procedure Laterality Date    COLONOSCOPY  1/13/2012    diverticulosisbenign large intestinal mucosa with focal mild increase in lymphoplasmacytic content of lamina propria    COLONOSCOPY  3/30/2011    diverticulosis, tubular adenoma right colon, sigmoid colon-villotubular adenoma    HERNIA REPAIR Right     LUNG BIOPSY      ROTATOR CUFF REPAIR Right          Current Facility-Administered Medications:     lactated ringers infusion, , Intravenous, Continuous, Taniya Yarbrough MD, Last Rate: 50 mL/hr at 02/04/20 0817    tobramycin (TOBREX) 0.3 % ophthalmic solution 1 drop, 1 drop, Right Eye, Q5 Min PRN, Taniya Yarbrough MD, 1 drop at 02/04/20 0819    bupivacaine (PF) (MARCAINE) 0.5 % injection 5 mg, 1 mL, Ophthalmic, See Admin Instructions, Taniya Yarbrough MD, 5 mg at 02/04/20 0819    LORazepam (ATIVAN) tablet 1 mg, 1 mg, Oral, Once, Taniya Yarbrough MD    diphenhydrAMINE (BENADRYL) injection 12.5 mg, 12.5 mg, Intravenous, Once PRN, Jaye Roldan MD    0.9 % sodium chloride bolus, 500 mL, Intravenous, Once PRN, Jaye Roldan MD    [facility]etron [facility]) injection 4 mg, 4 mg, Intravenous, Once PRN, Jaye Roldan MD    labetalol (NORMODYNE;TRANDATE) injection syringe 5 mg, 5 mg, Intravenous, Q10 Min PRN, Jaye Roldan MD  [address] [place] [address] hydrALAZINE (APRESOLINE) injection 5 mg, 5 mg, Intravenous, Q10 Min PRN, Chelita Blackwell MD    promethazine (PHENERGAN) 6.25 mg in sodium chloride 0.9 % 50 mL IVPB, 6.25 mg, Intravenous, Once PRN, Chelita Blackwell MD      Allergies   Allergen Reactions    Latex     Heparin     Lovenox [Enoxaparin Sodium]          PHYSICAL EXAMINATION    Vitals:    02/04/20 0753   BP: 118/65   Pulse: 73   Resp: 16   Temp: 97 °F (36.1 °C)   SpO2: 100%       Gen: NAD  HEENT: Visually significant cataract   Pulm: CTA Bilaterally  Heart: RRR, no murmurs  Abd: soft, non-tender  Ext: no edema  Neuro: no focal deficits    Assessment:   1. Visually significant cataract   2. See preoperative ophthalmology notes    Plan:   1. Risks, benefits, alternatives to surgery discussed with the patient and family. 2. All questions were answered to their satisfaction. 3. Ok to proceed with surgery as planned.     Bharathi Cameron

## 2020-02-04 NOTE — ANESTHESIA PRE PROCEDURE
12/19/2019    LABGLOM 38 12/19/2019    GLUCOSE 131 12/19/2019    GLUCOSE 119 01/24/2012    PROT 7.3 12/19/2019    CALCIUM 9.0 12/19/2019    BILITOT 0.39 12/19/2019    ALKPHOS 102 12/19/2019    AST 39 12/19/2019    ALT 31 12/19/2019       POC Tests: No results for input(s): POCGLU, POCNA, POCK, POCCL, POCBUN, POCHEMO, POCHCT in the last 72 hours. Coags: No results found for: PROTIME, INR, APTT    HCG (If Applicable): No results found for: PREGTESTUR, PREGSERUM, HCG, HCGQUANT     ABGs: No results found for: PHART, PO2ART, NXY3DEM, TZF0CPV, BEART, D7XUSVRD     Type & Screen (If Applicable):  No results found for: LABABO, [address]    Anesthesia Evaluation  Patient summary reviewed and Nursing notes reviewed  Airway: Mallampati: III  TM distance: >3 FB   Neck ROM: full  Mouth opening: > = 3 FB Dental: normal exam         Pulmonary:normal exam  breath sounds clear to auscultation                            ROS comment: silicosis   Cardiovascular:    (+) hypertension:,         Rhythm: regular  Rate: normal           Beta Blocker:  Dose within 24 Hrs         Neuro/Psych:   Negative Neuro/Psych ROS              GI/Hepatic/Renal:   (+) renal disease: CRI,           Endo/Other: Negative Endo/Other ROS                    Abdominal:           Vascular: negative vascular ROS. Anesthesia Plan      MAC     ASA 2       Induction: intravenous. Anesthetic plan and risks discussed with patient. Plan discussed with CRNA.                   Imani Pérez MD   2/4/2020

## 2020-02-04 NOTE — OP NOTE
Darlene George [id number] 66 y.o. OPERATIVE NOTE    Preoperative Diagnosis: Cataract the right eye    Postoperative Diagnosis: Cataract the right eye     Procedure: Phacoemulsification with intraocular lens implantation, the right eye    Surgeon: Yaneth Modi MD    Anesthesia: Topical  With monitored sedation      Complications: none    Specimens: none    EBL: none    Indications for procedure: The patient is a 66y.o. year old with decreased vision, glare and halos around lights, and trouble with activities of daily living. Examination revealed a visually significant cataract in the the right eye. Risks, benefits, and alternatives to surgery were discussed with the patient and the patient elected to proceed with phacoemulsification with lens implantation. Details of the procedure: Following informed consent, the patient was taken to the operating room and placed in the supine position. The eye was prepped and draped in the usual sterile fashion using aseptic technique for cataract surgery and a lid speculum was placed between the lids. Several drops of topical .5% Marcaine were place on the eye. A crescent blade was use to make a 2 mm tunnel at the limbus into clear cornea and a 2.75 mm keratome blade was used to enter the eye at the 9 o'clock postion. A side port incision was made in the    2 o'clock position. A small amount of 1% non-preserved lidocaine was placed in the eye. The eye was filled with viscoelastic and a continuous tear capsulorhexis was performed. The lens was hydrodissected and hydrodilineated with balanced salt solution. Phacoemulsification was performed in a divide and conquer technique. Irrigation/aspiration was used to remove all cortical material from the capsular bag. The eye was filled with viscoelastic and a foldable posterior chamber intraocular lens was injected into the capsular bag and rotated into position model [id number] 19.5 diopter power. Irrigation/aspiration was used to remove all excess viscoelastic. The eye was pressurized and the wounds were checked for leaks and none were found. The patient had antibiotic, steroid, timoptic and antibiotic/steroid ointment placed in the eye. The lid speculum was removed. The eye was covered with a shield. The patient went to the PACU in excellent condition, having tolerated the procedure extremely well and will follow up with me tomorrow for postop day 1 care. Post-op instructions were discussed with patient and family.      Luna Williamson MD

## 2020-02-04 NOTE — ANESTHESIA POSTPROCEDURE EVALUATION
Department of Anesthesiology  Postprocedure Note    Patient: Domonique Bang  MRN: [id number]  YOB: 1945  Date of evaluation: 2/4/2020  Time:  11:46 AM     Procedure Summary     Date:  02/04/20 Room / Location:  [address] [place] [address] Homero Alexander [address] / [facility]: [facility]    Anesthesia Start:  5787 Anesthesia Stop:  6008    Procedure:  EYE CATARACT EMULSIFICATION IOL IMPLANT (Right Eye) Diagnosis:       (RIGHT CATARACT)      (PAT ON ADMIT PER ANES)    Surgeon:  Minna Kussmaul, MD Responsible Provider:  Victoria Pollard MD    Anesthesia Type:  MAC ASA Status:  2          Anesthesia Type: MAC    Des Phase I: Des Score: 10    Des Phase II:      Last vitals: Reviewed and per EMR flowsheets.        Anesthesia Post Evaluation    Comments: POST- ANESTHESIA EVALUATION       Pt Name: Domonique Bang  MRN: [id number]  YOB: 1945  Date of evaluation: 2/4/2020  Time:  11:46 AM      /64   Pulse 62   Temp 97.3 °F (36.3 °C)   Resp 10   Ht 5' 11\" (1.803 m)   Wt 189 lb (85.7 kg)   SpO2 100%   BMI 26.36 kg/m²      Consciousness Level  Awake  Cardiopulmonary Status  Stable  Pain Adequately Treated YES  Nausea / Vomiting  NO  Adequate Hydration  YES  Anesthesia Related Complications NONE      Electronically signed by Victoria Pollard MD on 2/4/2020 at 11:46 AM

## 2020-03-03 ENCOUNTER — ANESTHESIA (OUTPATIENT)
Dept: OPERATING ROOM | Age: 75
End: 2020-03-03
Payer: MEDICARE

## 2020-03-03 ENCOUNTER — ANESTHESIA EVENT (OUTPATIENT)
Dept: OPERATING ROOM | Age: 75
End: 2020-03-03
Payer: MEDICARE

## 2020-03-03 ENCOUNTER — HOSPITAL ENCOUNTER (OUTPATIENT)
Age: 75
Setting detail: OUTPATIENT SURGERY
Discharge: HOME | End: 2020-03-03
Attending: OPHTHALMOLOGY | Admitting: OPHTHALMOLOGY
Payer: MEDICARE

## 2020-03-03 VITALS
TEMPERATURE: 97.5 F | WEIGHT: 189 LBS | BODY MASS INDEX: 26.46 KG/M2 | OXYGEN SATURATION: 97 % | HEART RATE: 70 BPM | DIASTOLIC BLOOD PRESSURE: 63 MMHG | RESPIRATION RATE: 20 BRPM | HEIGHT: 71 IN | SYSTOLIC BLOOD PRESSURE: 130 MMHG

## 2020-03-03 VITALS
SYSTOLIC BLOOD PRESSURE: 124 MMHG | TEMPERATURE: 97.5 F | RESPIRATION RATE: 14 BRPM | DIASTOLIC BLOOD PRESSURE: 63 MMHG | OXYGEN SATURATION: 100 %

## 2020-03-03 PROBLEM — H25.12 AGE-RELATED NUCLEAR CATARACT, LEFT: Status: RESOLVED | Noted: 2020-03-03 | Resolved: 2020-03-03

## 2020-03-03 PROCEDURE — 7100000000 HC PACU RECOVERY - FIRST 15 MIN: Performed by: OPHTHALMOLOGY

## 2020-03-03 PROCEDURE — 6370000000 HC RX 637 (ALT 250 FOR IP): Performed by: OPHTHALMOLOGY

## 2020-03-03 PROCEDURE — 2500000003 HC RX 250 WO HCPCS: Performed by: OPHTHALMOLOGY

## 2020-03-03 PROCEDURE — 3600000012 HC SURGERY LEVEL 2 ADDTL 15MIN: Performed by: OPHTHALMOLOGY

## 2020-03-03 PROCEDURE — 2580000003 HC RX 258: Performed by: OPHTHALMOLOGY

## 2020-03-03 PROCEDURE — 3700000001 HC ADD 15 MINUTES (ANESTHESIA): Performed by: OPHTHALMOLOGY

## 2020-03-03 PROCEDURE — 7100000001 HC PACU RECOVERY - ADDTL 15 MIN: Performed by: OPHTHALMOLOGY

## 2020-03-03 PROCEDURE — V2787 ASTIGMATISM-CORRECT FUNCTION: HCPCS | Performed by: OPHTHALMOLOGY

## 2020-03-03 PROCEDURE — 6360000002 HC RX W HCPCS: Performed by: ANESTHESIOLOGY

## 2020-03-03 PROCEDURE — 3700000000 HC ANESTHESIA ATTENDED CARE: Performed by: OPHTHALMOLOGY

## 2020-03-03 PROCEDURE — 3600000002 HC SURGERY LEVEL 2 BASE: Performed by: OPHTHALMOLOGY

## 2020-03-03 PROCEDURE — 6360000002 HC RX W HCPCS: Performed by: OPHTHALMOLOGY

## 2020-03-03 PROCEDURE — 2709999900 HC NON-CHARGEABLE SUPPLY: Performed by: OPHTHALMOLOGY

## 2020-03-03 RX ORDER — BUPIVACAINE HYDROCHLORIDE 5 MG/ML
INJECTION, SOLUTION EPIDURAL; INTRACAUDAL PRN
Status: DISCONTINUED | OUTPATIENT
Start: 2020-03-03 | End: 2020-03-03 | Stop reason: ALTCHOICE

## 2020-03-03 RX ORDER — CYCLOPENTOLATE HYDROCHLORIDE 10 MG/ML
1 SOLUTION/ DROPS OPHTHALMIC EVERY 5 MIN PRN
Status: COMPLETED | OUTPATIENT
Start: 2020-03-03 | End: 2020-03-03

## 2020-03-03 RX ORDER — SODIUM CHLORIDE, SODIUM LACTATE, POTASSIUM CHLORIDE, CALCIUM CHLORIDE 600; 310; 30; 20 MG/100ML; MG/100ML; MG/100ML; MG/100ML
INJECTION, SOLUTION INTRAVENOUS CONTINUOUS
Status: DISCONTINUED | OUTPATIENT
Start: 2020-03-03 | End: 2020-03-03 | Stop reason: HOSPADM

## 2020-03-03 RX ORDER — LORAZEPAM 1 MG/1
1 TABLET ORAL ONCE
Status: DISCONTINUED | OUTPATIENT
Start: 2020-03-03 | End: 2020-03-03 | Stop reason: HOSPADM

## 2020-03-03 RX ORDER — TOBRAMYCIN 3 MG/ML
1 SOLUTION/ DROPS OPHTHALMIC EVERY 5 MIN PRN
Status: DISCONTINUED | OUTPATIENT
Start: 2020-03-03 | End: 2020-03-03 | Stop reason: HOSPADM

## 2020-03-03 RX ORDER — KETOROLAC TROMETHAMINE 5 MG/ML
1 SOLUTION OPHTHALMIC EVERY 5 MIN PRN
Status: COMPLETED | OUTPATIENT
Start: 2020-03-03 | End: 2020-03-03

## 2020-03-03 RX ORDER — BUPIVACAINE HYDROCHLORIDE 5 MG/ML
1 INJECTION, SOLUTION EPIDURAL; INTRACAUDAL SEE ADMIN INSTRUCTIONS
Status: DISCONTINUED | OUTPATIENT
Start: 2020-03-03 | End: 2020-03-03 | Stop reason: HOSPADM

## 2020-03-03 RX ORDER — TOBRAMYCIN 3 MG/ML
SOLUTION/ DROPS OPHTHALMIC PRN
Status: DISCONTINUED | OUTPATIENT
Start: 2020-03-03 | End: 2020-03-03 | Stop reason: ALTCHOICE

## 2020-03-03 RX ORDER — TIMOLOL MALEATE 5 MG/ML
SOLUTION/ DROPS OPHTHALMIC PRN
Status: DISCONTINUED | OUTPATIENT
Start: 2020-03-03 | End: 2020-03-03 | Stop reason: ALTCHOICE

## 2020-03-03 RX ORDER — BALANCED SALT SOLUTION ENRICHED WITH BICARBONATE, DEXTROSE, AND GLUTATHIONE
KIT INTRAOCULAR PRN
Status: DISCONTINUED | OUTPATIENT
Start: 2020-03-03 | End: 2020-03-03 | Stop reason: ALTCHOICE

## 2020-03-03 RX ORDER — MIDAZOLAM HYDROCHLORIDE 1 MG/ML
INJECTION INTRAMUSCULAR; INTRAVENOUS PRN
Status: DISCONTINUED | OUTPATIENT
Start: 2020-03-03 | End: 2020-03-03 | Stop reason: SDUPTHER

## 2020-03-03 RX ORDER — LIDOCAINE HYDROCHLORIDE 10 MG/ML
INJECTION, SOLUTION INFILTRATION; PERINEURAL PRN
Status: DISCONTINUED | OUTPATIENT
Start: 2020-03-03 | End: 2020-03-03 | Stop reason: ALTCHOICE

## 2020-03-03 RX ORDER — PHENYLEPHRINE HCL 2.5 %
1 DROPS OPHTHALMIC (EYE) EVERY 5 MIN PRN
Status: COMPLETED | OUTPATIENT
Start: 2020-03-03 | End: 2020-03-03

## 2020-03-03 RX ADMIN — MIDAZOLAM 1 MG: 1 INJECTION INTRAMUSCULAR; INTRAVENOUS at 11:15

## 2020-03-03 RX ADMIN — MIDAZOLAM 1 MG: 1 INJECTION INTRAMUSCULAR; INTRAVENOUS at 11:10

## 2020-03-03 RX ADMIN — CYCLOPENTOLATE HYDROCHLORIDE 1 DROP: 10 SOLUTION/ DROPS OPHTHALMIC at 09:42

## 2020-03-03 RX ADMIN — KETOROLAC TROMETHAMINE 1 DROP: 5 SOLUTION/ DROPS OPHTHALMIC at 09:36

## 2020-03-03 RX ADMIN — KETOROLAC TROMETHAMINE 1 DROP: 5 SOLUTION/ DROPS OPHTHALMIC at 09:42

## 2020-03-03 RX ADMIN — BUPIVACAINE HYDROCHLORIDE 5 MG: 5 INJECTION, SOLUTION EPIDURAL; INTRACAUDAL; PERINEURAL at 09:36

## 2020-03-03 RX ADMIN — SODIUM CHLORIDE, POTASSIUM CHLORIDE, SODIUM LACTATE AND CALCIUM CHLORIDE: 600; 310; 30; 20 INJECTION, SOLUTION INTRAVENOUS at 09:52

## 2020-03-03 RX ADMIN — TOBRAMYCIN OPHTHALMIC SOLUTION 1 DROP: 3 SOLUTION/ DROPS OPHTHALMIC at 09:43

## 2020-03-03 RX ADMIN — PHENYLEPHRINE HYDROCHLORIDE 1 DROP: 25 SOLUTION/ DROPS OPHTHALMIC at 09:36

## 2020-03-03 RX ADMIN — PHENYLEPHRINE HYDROCHLORIDE 1 DROP: 25 SOLUTION/ DROPS OPHTHALMIC at 09:42

## 2020-03-03 RX ADMIN — BUPIVACAINE HYDROCHLORIDE 5 MG: 5 INJECTION, SOLUTION EPIDURAL; INTRACAUDAL; PERINEURAL at 09:42

## 2020-03-03 RX ADMIN — CYCLOPENTOLATE HYDROCHLORIDE 1 DROP: 10 SOLUTION/ DROPS OPHTHALMIC at 09:36

## 2020-03-03 RX ADMIN — TOBRAMYCIN OPHTHALMIC SOLUTION 1 DROP: 3 SOLUTION/ DROPS OPHTHALMIC at 09:37

## 2020-03-03 ASSESSMENT — PULMONARY FUNCTION TESTS
PIF_VALUE: 0
PIF_VALUE: 0
PIF_VALUE: 4
PIF_VALUE: 0
PIF_VALUE: 1
PIF_VALUE: 0

## 2020-03-03 ASSESSMENT — PAIN SCALES - GENERAL
PAINLEVEL_OUTOF10: 0

## 2020-03-03 ASSESSMENT — PAIN - FUNCTIONAL ASSESSMENT: PAIN_FUNCTIONAL_ASSESSMENT: 0-10

## 2020-03-03 NOTE — ANESTHESIA POSTPROCEDURE EVALUATION
POST- ANESTHESIA EVALUATION       Pt Name: Danika Farmer  MRN: [id number]  [place]: 1945  Date of evaluation: 3/3/2020  Time:  12:46 PM      /63   Pulse 70   Temp 97.5 °F (36.4 °C)   Resp 20   Ht 5' 11\" (1.803 m)   Wt 189 lb (85.7 kg)   SpO2 97%   BMI 26.36 kg/m²      Consciousness Level  Awake  Cardiopulmonary Status  Stable  Pain Adequately Treated YES  Nausea / Vomiting  NO  Adequate Hydration  YES  Anesthesia Related Complications NONE      Electronically signed by Walter Herrera MD on 3/3/2020 at 12:46 PM       Department of Anesthesiology  Postprocedure Note    Patient: Danika Farmer  MRN: [id number]  YOB: 1945  Date of evaluation: 3/3/2020  Time:  12:46 PM     Procedure Summary     Date:  03/03/20 Room / Location:  [address] [place] [address] Homero Alexander 06 / [facility]: [facility]    Anesthesia Start:  3540 Anesthesia Stop:  1140    Procedure:  EYE CATARACT EMULSIFICATION IOL IMPLANT  ENHANCED TORIC  LENS (Left Eye) Diagnosis:       (LEFT CATARACT)      (PAT ON ADMIT PER ANES)    Surgeon:  Devon Chopra MD Responsible Provider:  Les Menchaca MD    Anesthesia Type:  MAC ASA Status:  3          Anesthesia Type: MAC    Des Phase I: Des Score: 10    Des Phase II:      Last vitals: Reviewed and per EMR flowsheets.        Anesthesia Post Evaluation

## 2020-03-03 NOTE — OP NOTE
Amos Livingston [id number] 66 y.o. OPERATIVE NOTE    Preoperative Diagnosis: Cataract the left eye    Postoperative Diagnosis: Cataract the left eye     Procedure: Phacoemulsification with intraocular lens implantation, the left eye    Surgeon: Jose M Kramer MD    Anesthesia: Topical  With monitored sedation      Complications: none    Specimens: none    EBL: none    Indications for procedure: The patient is a 66y.o. year old with decreased vision, glare and halos around lights, and trouble with activities of daily living. Examination revealed a visually significant cataract in the the left eye. Risks, benefits, and alternatives to surgery were discussed with the patient and the patient elected to proceed with phacoemulsification with lens implantation. The patient chose a toric IOL for this eye. Pre-operatively the eye was marked for toric IOL orientation. Details of the procedure: Following informed consent, the patient was taken to the operating room and placed in the supine position. The eye was prepped and draped in the usual sterile fashion using aseptic technique for cataract surgery and a lid speculum was placed between the lids. Several drops of topical .5% Marcaine were place on the eye. A crescent blade was use to make a 2 mm tunnel at the limbus into clear cornea and a 2.75 mm keratome blade was used to enter the eye at the 3 o'clock postion. A side port incision was made in the    5 o'clock position. A small amount of 1% non-preserved lidocaine was placed in the eye. The eye was filled with viscoelastic and a continuous tear capsulorhexis was performed. The lens was hydrodissected and hydrodilineated with balanced salt solution. Phacoemulsification was performed in a divide and conquer technique. Irrigation/aspiration was used to remove all cortical material from the capsular bag.   The eye was filled with viscoelastic and a foldable posterior chamber intraocular lens was injected into the capsular bag and rotated into position model [id number]  20.0 diopter power  @ 2 deg. Irrigation/aspiration was used to remove all excess viscoelastic. The eye was pressurized and the wounds were checked for leaks and none were found. The patient had antibiotic, steroid, timoptic and antibiotic/steroid ointment placed in the eye. The lid speculum was removed. The eye was covered with a shield. The patient went to the PACU in excellent condition, having tolerated the procedure extremely well and will follow up with me tomorrow for postop day 1 care. Post-op instructions were discussed with patient and family.      Hollis Howe MD

## 2020-03-03 NOTE — H&P
70   Resp: 16   Temp: 97 °F (36.1 °C)   SpO2: 99%       Gen: NAD  HEENT: Visually significant cataract   Pulm: CTA Bilaterally  Heart: RRR, no murmurs  Abd: soft, non-tender  Ext: no edema  Neuro: no focal deficits    Assessment:   1. Visually significant cataract   2. See preoperative ophthalmology notes    Plan:   1. Risks, benefits, alternatives to surgery discussed with the patient and family. 2. All questions were answered to their satisfaction. 3. Ok to proceed with surgery as planned.     Bharathi Cameron

## 2020-03-03 NOTE — ANESTHESIA PRE PROCEDURE
Department of Anesthesiology  Preprocedure Note       Name:  Dmitriy Farnsworth   Age:  66 y.o.  :  1945                                          MRN:  [id number]         Date:  3/3/2020      Surgeon: Swapnil Babb):  Renato Lenz MD    Procedure: EYE CATARACT EMULSIFICATION IOL IMPLANT  ENHANCED TORIC  LENS (Left Eye)    Medications prior to admission:   Prior to Admission medications    Medication Sig Start Date End Date Taking? Authorizing Provider   amLODIPine (NORVASC) 5 MG tablet Take 5 mg by mouth daily  19   Historical Provider, MD   metoprolol succinate (TOPROL XL) 50 MG extended release tablet Take 50 mg by mouth daily  19   Historical Provider, MD       Current medications:    No current facility-administered medications for this visit. No current outpatient medications on file. Facility-Administered Medications Ordered in Other Visits   Medication Dose Route Frequency Provider Last Rate Last Dose    lactated ringers infusion   Intravenous Continuous Renato Lenz MD        phenylephrine (MYDFRIN) 2.5 % ophthalmic solution 1 drop  1 drop Left Eye Q5 Min PRN Renato Lenz MD        cyclopentolate (CYCLOGYL) 1 % ophthalmic solution 1 drop  1 drop Left Eye Q5 Min PRN Renato Lenz MD        tobramycin (TOBREX) 0.3 % ophthalmic solution 1 drop  1 drop Left Eye Q5 Min PRN Renato Lenz MD        ketorolac (ACULAR) 0.5 % ophthalmic solution 1 drop  1 drop Left Eye Q5 Min PRN Renato Lenz MD        bupivacaine (PF) (MARCAINE) 0.5 % injection 5 mg  1 mL Ophthalmic See Admin Instructions Renato Lenz MD        LORazepam (ATIVAN) tablet 1 mg  1 mg Oral Once Renato Lenz MD           Allergies:     Allergies   Allergen Reactions    Latex     Heparin     Lovenox [Enoxaparin Sodium]        Problem List:    Patient Active Problem List   Diagnosis Code    CKD (chronic kidney disease) stage 3, GFR 30-59 ml/min N18.3    Acute renal failure ([facility] [address] N17.9    Gout M10.9    Arthritis, septic, knee (HCC) M00.9    Hx of gout Z87.39    Elevated ferritin R79.89    Thrombocytopenia (HCC) D69.6       Past Medical History:        Diagnosis Date    Acute renal failure (HCC)     Anemia     CKD (chronic kidney disease) stage 3, GFR 30-59 ml/min (HCC)     Depression     HTN (hypertension)     Silicosis ([address]     Skin cancer     Unspecified hypertensive kidney disease with chronic kidney disease stage I through stage IV, or unspecified(403.90) 4/15/2015       Past Surgical History:        Procedure Laterality Date    CATARACT REMOVAL WITH IMPLANT Right 02/04/2020    [facility]    COLONOSCOPY  1/13/2012    diverticulosisbenign large intestinal mucosa with focal mild increase in lymphoplasmacytic content of lamina propria    COLONOSCOPY  3/30/2011    diverticulosis, tubular adenoma right colon, sigmoid colon-villotubular adenoma    HERNIA REPAIR Right     INTRACAPSULAR CATARACT EXTRACTION Right 2/4/2020    EYE CATARACT EMULSIFICATION IOL IMPLANT performed by Leyda Vaca MD at [address] Right        Social History:    Social History     Tobacco Use    Smoking status: Former Smoker    Smokeless tobacco: Never Used   Substance Use Topics    Alcohol use: Yes     Comment: sometimes                                 Counseling given: Not Answered      Vital Signs (Current): There were no vitals filed for this visit.                                            BP Readings from Last 3 Encounters:   02/04/20 110/64   02/04/20 117/63   12/19/19 139/70       NPO Status:                                                                                 BMI:   Wt Readings from Last 3 Encounters:   02/04/20 189 lb (85.7 kg)   12/19/19 189 lb 3.2 oz (85.8 kg)   04/25/19 194 lb 11.2 oz (88.3 kg)     There is no height or weight on file to calculate BMI.    CBC:   Lab Results   Component Value Date    WBC 4.5 12/19/2019 RBC 4.72 12/19/2019    RBC 4.09 01/24/2012    HGB 14.1 12/19/2019    HCT 43.0 12/19/2019    MCV 91.1 12/19/2019    RDW 14.4 12/19/2019     12/19/2019     01/24/2012       CMP:   Lab Results   Component Value Date     12/19/2019    K 5.4 12/19/2019     12/19/2019    CO2 23 12/19/2019    BUN 41 12/19/2019    CREATININE 1.77 12/19/2019    GFRAA 46 12/19/2019    LABGLOM 38 12/19/2019    GLUCOSE 131 12/19/2019    GLUCOSE 119 01/24/2012    PROT 7.3 12/19/2019    CALCIUM 9.0 12/19/2019    BILITOT 0.39 12/19/2019    ALKPHOS 102 12/19/2019    AST 39 12/19/2019    ALT 31 12/19/2019       POC Tests: No results for input(s): POCGLU, POCNA, POCK, POCCL, POCBUN, POCHEMO, POCHCT in the last 72 hours. Coags: No results found for: PROTIME, INR, APTT    HCG (If Applicable): No results found for: PREGTESTUR, PREGSERUM, HCG, HCGQUANT     ABGs: No results found for: PHART, PO2ART, JDO3OXD, GII9ELU, BEART, T5EHZSZB     Type & Screen (If Applicable):  No results found for: LABABO, [address]    Anesthesia Evaluation  Patient summary reviewed and Nursing notes reviewed no history of anesthetic complications:   Airway: Mallampati: III  TM distance: >3 FB   Neck ROM: full  Mouth opening: > = 3 FB Dental: normal exam         Pulmonary:Negative Pulmonary ROS and normal exam  breath sounds clear to auscultation                            ROS comment: silicosis   Cardiovascular:    (+) hypertension:,         Rhythm: regular  Rate: normal           Beta Blocker:  Dose within 24 Hrs         Neuro/Psych:   (+) psychiatric history:depression/anxiety              ROS comment: Silicosis  GI/Hepatic/Renal:   (+) renal disease: CRI,           Endo/Other:    (+) blood dyscrasia: thrombocytopenia, arthritis:., .                  ROS comment: Gout Abdominal:           Vascular: negative vascular ROS. Anesthesia Plan      MAC     ASA 3       Induction: intravenous.       Anesthetic plan and risks discussed with patient.                       Deangelo Rea MD   3/3/2020

## 2020-12-07 ENCOUNTER — HOSPITAL ENCOUNTER (OUTPATIENT)
Age: 75
Discharge: HOME | End: 2020-12-07
Payer: MEDICARE

## 2020-12-07 LAB
ABSOLUTE EOS #: 0.1 K/UL (ref 0–0.4)
ABSOLUTE IMMATURE GRANULOCYTE: ABNORMAL K/UL (ref 0–0.3)
ABSOLUTE LYMPH #: 0.9 K/UL (ref 1–4.8)
ABSOLUTE MONO #: 0.4 K/UL (ref 0.1–1.3)
ALBUMIN SERPL-MCNC: 4.1 G/DL (ref 3.5–5.2)
ALBUMIN/GLOBULIN RATIO: ABNORMAL (ref 1–2.5)
ALP BLD-CCNC: 100 U/L (ref 40–129)
ALT SERPL-CCNC: 22 U/L (ref 5–41)
ANION GAP SERPL CALCULATED.3IONS-SCNC: 10 MMOL/L (ref 9–17)
AST SERPL-CCNC: 33 U/L
BASOPHILS # BLD: 1 % (ref 0–2)
BASOPHILS ABSOLUTE: 0 K/UL (ref 0–0.2)
BILIRUB SERPL-MCNC: 0.47 MG/DL (ref 0.3–1.2)
BUN BLDV-MCNC: 47 MG/DL (ref 8–23)
BUN/CREAT BLD: ABNORMAL (ref 9–20)
CALCIUM SERPL-MCNC: 9.5 MG/DL (ref 8.6–10.4)
CHLORIDE BLD-SCNC: 106 MMOL/L (ref 98–107)
CO2: 23 MMOL/L (ref 20–31)
CREAT SERPL-MCNC: 1.86 MG/DL (ref 0.7–1.2)
DIFFERENTIAL TYPE: ABNORMAL
EOSINOPHILS RELATIVE PERCENT: 3 % (ref 0–4)
GFR AFRICAN AMERICAN: 43 ML/MIN
GFR NON-AFRICAN AMERICAN: 35 ML/MIN
GFR SERPL CREATININE-BSD FRML MDRD: ABNORMAL ML/MIN/{1.73_M2}
GFR SERPL CREATININE-BSD FRML MDRD: ABNORMAL ML/MIN/{1.73_M2}
GLUCOSE BLD-MCNC: 99 MG/DL (ref 70–99)
HCT VFR BLD CALC: 41.1 % (ref 41–53)
HEMOGLOBIN: 14.1 G/DL (ref 13.5–17.5)
IMMATURE GRANULOCYTES: ABNORMAL %
LYMPHOCYTES # BLD: 22 % (ref 24–44)
MCH RBC QN AUTO: 32 PG (ref 26–34)
MCHC RBC AUTO-ENTMCNC: 34.3 G/DL (ref 31–37)
MCV RBC AUTO: 93.4 FL (ref 80–100)
MONOCYTES # BLD: 9 % (ref 1–7)
NRBC AUTOMATED: ABNORMAL PER 100 WBC
PDW BLD-RTO: 14.3 % (ref 11.5–14.9)
PLATELET # BLD: 102 K/UL (ref 150–450)
PLATELET ESTIMATE: ABNORMAL
PMV BLD AUTO: 8.4 FL (ref 6–12)
POTASSIUM SERPL-SCNC: 5 MMOL/L (ref 3.7–5.3)
RBC # BLD: 4.4 M/UL (ref 4.5–5.9)
RBC # BLD: ABNORMAL 10*6/UL
SEG NEUTROPHILS: 65 % (ref 36–66)
SEGMENTED NEUTROPHILS ABSOLUTE COUNT: 2.8 K/UL (ref 1.3–9.1)
SODIUM BLD-SCNC: 139 MMOL/L (ref 135–144)
TOTAL PROTEIN: 7 G/DL (ref 6.4–8.3)
WBC # BLD: 4.3 K/UL (ref 3.5–11)
WBC # BLD: ABNORMAL 10*3/UL

## 2020-12-07 PROCEDURE — 80053 COMPREHEN METABOLIC PANEL: CPT

## 2020-12-07 PROCEDURE — 85025 COMPLETE CBC W/AUTO DIFF WBC: CPT

## 2020-12-07 PROCEDURE — 36415 COLL VENOUS BLD VENIPUNCTURE: CPT

## 2020-12-15 ENCOUNTER — HOSPITAL ENCOUNTER (OUTPATIENT)
Dept: VASCULAR LAB | Age: 75
Discharge: HOME | End: 2020-12-15
Payer: MEDICARE

## 2020-12-15 ENCOUNTER — HOSPITAL ENCOUNTER (OUTPATIENT)
Dept: ULTRASOUND IMAGING | Age: 75
Discharge: HOME | End: 2020-12-17
Payer: MEDICARE

## 2020-12-15 ENCOUNTER — HOSPITAL ENCOUNTER (OUTPATIENT)
Age: 75
Discharge: HOME | End: 2020-12-17
Payer: MEDICARE

## 2020-12-15 ENCOUNTER — HOSPITAL ENCOUNTER (OUTPATIENT)
Age: 75
Discharge: HOME | End: 2020-12-15
Payer: MEDICARE

## 2020-12-15 PROCEDURE — 76706 US ABDL AORTA SCREEN AAA: CPT

## 2020-12-15 PROCEDURE — 76775 US EXAM ABDO BACK WALL LIM: CPT

## 2020-12-17 ENCOUNTER — OFFICE VISIT (OUTPATIENT)
Dept: ONCOLOGY | Age: 75
End: 2020-12-17
Payer: MEDICARE

## 2020-12-17 ENCOUNTER — TELEPHONE (OUTPATIENT)
Dept: ONCOLOGY | Age: 75
End: 2020-12-17

## 2020-12-17 ENCOUNTER — HOSPITAL ENCOUNTER (OUTPATIENT)
Age: 75
Discharge: HOME | End: 2020-12-17
Payer: MEDICARE

## 2020-12-17 ENCOUNTER — HOSPITAL ENCOUNTER (OUTPATIENT)
Age: 75
Setting detail: SPECIMEN
Discharge: HOME | End: 2020-12-17
Payer: MEDICARE

## 2020-12-17 VITALS
TEMPERATURE: 97.5 F | SYSTOLIC BLOOD PRESSURE: 121 MMHG | WEIGHT: 189.1 LBS | BODY MASS INDEX: 26.37 KG/M2 | DIASTOLIC BLOOD PRESSURE: 75 MMHG | HEART RATE: 85 BPM

## 2020-12-17 DIAGNOSIS — D69.6 THROMBOCYTOPENIA (HCC): ICD-10-CM

## 2020-12-17 LAB
-: ABNORMAL
AMORPHOUS: ABNORMAL
BACTERIA: ABNORMAL
BILIRUBIN URINE: NEGATIVE
CASTS UA: ABNORMAL /LPF
CASTS UA: ABNORMAL /LPF
COLOR: YELLOW
COMMENT UA: ABNORMAL
CRYSTALS, UA: ABNORMAL /HPF
EPITHELIAL CELLS UA: ABNORMAL /HPF
FERRITIN: 672 UG/L (ref 30–400)
GLUCOSE URINE: NEGATIVE
KETONES, URINE: NEGATIVE
LEUKOCYTE ESTERASE, URINE: NEGATIVE
MUCUS: ABNORMAL
NITRITE, URINE: NEGATIVE
OTHER OBSERVATIONS UA: ABNORMAL
PH UA: 5.5 (ref 5–8)
PROTEIN UA: ABNORMAL
RBC UA: ABNORMAL /HPF
RENAL EPITHELIAL, UA: ABNORMAL /HPF
SPECIFIC GRAVITY UA: 1.02 (ref 1–1.03)
TRICHOMONAS: ABNORMAL
TURBIDITY: CLEAR
URINE HGB: NEGATIVE
UROBILINOGEN, URINE: NORMAL
WBC UA: ABNORMAL /HPF
YEAST: ABNORMAL

## 2020-12-17 PROCEDURE — 99213 OFFICE O/P EST LOW 20 MIN: CPT | Performed by: INTERNAL MEDICINE

## 2020-12-17 PROCEDURE — 1123F ACP DISCUSS/DSCN MKR DOCD: CPT | Performed by: INTERNAL MEDICINE

## 2020-12-17 PROCEDURE — 82728 ASSAY OF FERRITIN: CPT

## 2020-12-17 PROCEDURE — 1036F TOBACCO NON-USER: CPT | Performed by: INTERNAL MEDICINE

## 2020-12-17 PROCEDURE — 81001 URINALYSIS AUTO W/SCOPE: CPT

## 2020-12-17 PROCEDURE — 99211 OFF/OP EST MAY X REQ PHY/QHP: CPT | Performed by: INTERNAL MEDICINE

## 2020-12-17 PROCEDURE — G8427 DOCREV CUR MEDS BY ELIG CLIN: HCPCS | Performed by: INTERNAL MEDICINE

## 2020-12-17 PROCEDURE — 4040F PNEUMOC VAC/ADMIN/RCVD: CPT | Performed by: INTERNAL MEDICINE

## 2020-12-17 PROCEDURE — G8417 CALC BMI ABV UP PARAM F/U: HCPCS | Performed by: INTERNAL MEDICINE

## 2020-12-17 PROCEDURE — G8484 FLU IMMUNIZE NO ADMIN: HCPCS | Performed by: INTERNAL MEDICINE

## 2020-12-17 RX ORDER — PROBENECID 500 MG/1
500 TABLET, FILM COATED ORAL 2 TIMES DAILY
COMMUNITY
End: 2022-01-28

## 2020-12-17 NOTE — PROGRESS NOTES
extraction (Right, 2/4/2020); and Intracapsular cataract extraction (Left, 3/3/2020). CURRENT MEDICATIONS:  has a current medication list which includes the following prescription(s): probenecid, amlodipine, and metoprolol succinate. ALLERGIES:  is allergic to latex; heparin; and lovenox [enoxaparin sodium]. FAMILY HISTORY: Negative for any hematological or oncological conditions. SOCIAL HISTORY:  reports that he has quit smoking. He has never used smokeless tobacco. He reports current alcohol use. He reports that he does not use drugs. REVIEW OF SYSTEMS:   General: No fever or night sweats. Weight is stable. ENT: No double or blurred vision, no tinnitus or hearing problem, no dysphagia or sore throat   Respiratory: No chest pain, no shortness of breath, no cough or hemoptysis. Cardiovascular: Denies chest pain, PND or orthopnea. No L E swelling or palpitations. Gastrointestinal: No nausea or vomiting, abdominal pain, diarrhea or constipation. Genitourinary: Denies dysuria, hematuria, frequency, urgency or incontinence. Neurological: Denies headaches, decreased LOC, no sensory or motor focal deficits. Musculoskeletal: No arthralgia no back pain or joint swelling. Gouty arthritis with multiple nodules - improved  Skin: There are no rashes or bleeding. Psychiatric: No anxiety, no depression. Endocrine: No diabetes or thyroid disease. Hematologic: No bleeding, no adenopathy. PHYSICAL EXAM: Shows a well appearing 66y.o.-year-old male who is not in pain or distress. Vital Signs: Blood pressure 121/75, pulse 85, temperature 97.5 °F (36.4 °C), temperature source Oral, weight 189 lb 1.6 oz (85.8 kg). HEENT: Normocephalic and atraumatic. Pupils are equal, round, reactive to light and accommodation. Extraocular muscles are intact. Neck: Showed no JVD, no carotid bruit . Lungs: Clear to auscultation bilaterally. Heart: Regular without any murmur. Abdomen: Soft, nontender.  No hepatosplenomegaly. Extremities: Gouty arthritis on the hands with multiple tophi. Lower extremities show no edema, clubbing, or cyanosis. Neuro exam: intact cranial nerves bilaterally no motor or sensory deficit, gait is normal. Lymphatic: no adenopathy appreciated in the supraclavicular, axillary, cervical or inguinal area    REVIEW OF LABORATORY DATA:   Lab Results   Component Value Date    WBC 4.3 12/07/2020    HGB 14.1 12/07/2020    HCT 41.1 12/07/2020    MCV 93.4 12/07/2020     (L) 12/07/2020       Chemistry        Component Value Date/Time     12/07/2020 0756    K 5.0 12/07/2020 0756     12/07/2020 0756    CO2 23 12/07/2020 0756    BUN 47 (H) 12/07/2020 0756    CREATININE 1.86 (H) 12/07/2020 0756        Component Value Date/Time    CALCIUM 9.5 12/07/2020 0756    ALKPHOS 100 12/07/2020 0756    AST 33 12/07/2020 0756    ALT 22 12/07/2020 0756    BILITOT 0.47 12/07/2020 0756        Lab Results   Component Value Date    IRON 87 01/29/2019    TIBC 272 01/29/2019    FERRITIN 730 (H) 11/26/2019     Results for Laurence Ferrer \"VAL\" (MRN [id number]) as of 4/25/2019 15:44   Ref. Range 1/29/2019 14:46 3/20/2019 07:41 3/28/2019 08:24   Uric Acid Latest Ref Range: 3.4 - 7.0 mg/dL 9.1 (H)  6.8         REVIEW OF RADIOLOGICAL RESULTS:  12/15/2020 US RENAL IMPRESSION:      Multiple bilateral simple renal cysts.  No follow-up of the cysts is needed.       Evidence of mild medical renal disease         IMPRESSION:   1. Elevated ferritin-most likely related to chronic gouty arthritis but there is a component of iron overload secondary to H63D mutation. Likely a minor component. 2. Thrombocytopenia. 3. Hx of normocytic anemia, likely secondary to chronic arthritis  4. HX gout, CKD  5. 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 Elevated uric acid - started on Probenecid 02/2019    PLAN:   1.  We reviewed his lab work, overall his counts are stable, unfortunately his ferritin was not checked. We will check that today  2. We discussed the results of his renal US, some cysts are seen, fine otherwise. 3. I am ordering iron studies to be done today. 4. We will continue with conservative surveillance. 5. Return in 1 year.

## 2020-12-17 NOTE — TELEPHONE ENCOUNTER
AVS from 12/17/2020     Need ferritin today, need cbc, cmp ferritin prior to rv  rv in one year     Ferritin drawn today    PT given orders to have cbc, cmp, ferritin one week prior to RV    RV scheduled 12/16/21 @ 10am    PT was given AVS and an appt schedule    Electronically signed by Stephie Lunsford on 12/17/2020 at 9:32 AM

## 2021-01-04 ENCOUNTER — HOSPITAL ENCOUNTER (OUTPATIENT)
Age: 76
Discharge: HOME | End: 2021-01-06
Payer: MEDICARE

## 2021-01-04 ENCOUNTER — HOSPITAL ENCOUNTER (OUTPATIENT)
Dept: GENERAL RADIOLOGY | Age: 76
Discharge: HOME | End: 2021-01-06
Payer: MEDICARE

## 2021-01-04 DIAGNOSIS — J84.89 BOOP (BRONCHIOLITIS OBLITERANS WITH ORGANIZING PNEUMONIA) (HCC): ICD-10-CM

## 2021-01-04 PROCEDURE — 71046 X-RAY EXAM CHEST 2 VIEWS: CPT

## 2021-03-23 ENCOUNTER — HOSPITAL ENCOUNTER (OUTPATIENT)
Age: 76
Discharge: HOME | End: 2021-03-23
Payer: MEDICARE

## 2021-03-23 LAB — PROSTATE SPECIFIC ANTIGEN: 0.76 UG/L

## 2021-03-23 PROCEDURE — 36415 COLL VENOUS BLD VENIPUNCTURE: CPT

## 2021-03-23 PROCEDURE — G0103 PSA SCREENING: HCPCS

## 2021-04-13 ENCOUNTER — OFFICE VISIT (OUTPATIENT)
Dept: UROLOGY | Age: 76
End: 2021-04-13
Payer: MEDICARE

## 2021-04-13 VITALS
BODY MASS INDEX: 26.46 KG/M2 | DIASTOLIC BLOOD PRESSURE: 63 MMHG | TEMPERATURE: 97.6 F | WEIGHT: 189 LBS | HEART RATE: 85 BPM | HEIGHT: 71 IN | SYSTOLIC BLOOD PRESSURE: 133 MMHG

## 2021-04-13 DIAGNOSIS — R39.12 WEAK URINARY STREAM: Primary | ICD-10-CM

## 2021-04-13 PROCEDURE — 4040F PNEUMOC VAC/ADMIN/RCVD: CPT | Performed by: UROLOGY

## 2021-04-13 PROCEDURE — 99203 OFFICE O/P NEW LOW 30 MIN: CPT | Performed by: UROLOGY

## 2021-04-13 PROCEDURE — G8417 CALC BMI ABV UP PARAM F/U: HCPCS | Performed by: UROLOGY

## 2021-04-13 PROCEDURE — 1123F ACP DISCUSS/DSCN MKR DOCD: CPT | Performed by: UROLOGY

## 2021-04-13 PROCEDURE — 1036F TOBACCO NON-USER: CPT | Performed by: UROLOGY

## 2021-04-13 PROCEDURE — G8427 DOCREV CUR MEDS BY ELIG CLIN: HCPCS | Performed by: UROLOGY

## 2021-04-13 RX ORDER — AMLODIPINE BESYLATE 10 MG/1
TABLET ORAL
COMMUNITY
Start: 2021-03-24 | End: 2022-01-28

## 2021-04-13 RX ORDER — TAMSULOSIN HYDROCHLORIDE 0.4 MG/1
0.4 CAPSULE ORAL DAILY
Qty: 90 CAPSULE | Refills: 3 | Status: SHIPPED | OUTPATIENT
Start: 2021-04-13

## 2021-04-13 ASSESSMENT — ENCOUNTER SYMPTOMS
SHORTNESS OF BREATH: 0
DIARRHEA: 0
VOMITING: 0
WHEEZING: 0
NAUSEA: 0
EYE REDNESS: 0
CONSTIPATION: 0
EYE PAIN: 0
BACK PAIN: 0
COUGH: 0
ABDOMINAL PAIN: 0

## 2021-04-13 NOTE — PROGRESS NOTES
[address] [address]  Dept: [phone]  Dept Fax: [phone] [facility] [place] Urology Office Note - New patient    Patient:  Donnie Mercedes  YOB: 1945  Date: 4/13/2021    The patient is a 66 y.o. male who presents todayfor evaluation of the following problems:   Chief Complaint   Patient presents with    New Patient     C/O of weak urinary stream     referred by Charly Paige MD.      HPI  Here as a New pt for weak flow starting one year ago, he has some intermittency, nocturia 1-2 x. He has no hematuria, he has some burning at the tip of his penis when he has has to urinate infrequently. He has no known family Hx of prostate, kidney or bladder cancer. His stream does start right away, but it is slow. He does feel like he empties. No hematuria, rare burning. (Patient's old records have been requested, reviewed and summarized in today's note.)    Summary of old records: N/A    Additional History: N/A    Procedures Today: N/A    Last several PSA's:  Lab Results   Component Value Date    PSA 0.76 03/23/2021    PSA 0.46 01/30/2018    PSA 0.47 07/06/2016     Last total testosterone:  No results found for: TESTOSTERONE  Urinalysis today:  No results found for this visit on 04/13/21. AUA Symptom Score (4/13/2021):   INCOMPLETE EMPTYING: How often have you had the sensation of not emptying your bladder?: Not at all  FREQUENCY: How often do you have to urinate less than every two hours?: Not at all  INTERMITTENCY: How often have you found you stopped and started again several times when you urinated?: Less than Half the time  URGENCY: How often have you found it difficult to postpone urination?: Not at all  WEAK STREAM: How often have you had a weak urinary stream?: More than Half the time  STRAINING: How often have you had to strain to start  urination?: Not at all  NOCTURIA: How many times did you typically get up at night to uriniate?: 1 Time  TOTAL I-PSS SCORE[de-identified] 7  How would you feel if you were to spend the rest of your life with your urinary condition?: Mostly Satisfied    Last BUN and creatinine:  Lab Results   Component Value Date    BUN 47 (H) 12/07/2020     Lab Results   Component Value Date    CREATININE 1.86 (H) 12/07/2020       Additional Lab/Culture results: none    Imaging Reviewed during this Office Visit: none  (results were independently reviewed by physician and radiology report verified)    PAST MEDICAL, FAMILY AND SOCIAL HISTORY:  Past Medical History:   Diagnosis Date    [facility] [address]     Anemia     [facility]epsy [facility]/03/2020    [facility]    CKD (chronic kidney disease) stage 3, GFR 30-59 ml/min     Depression     HTN (hypertension)     Silicosis ([facility] [address]     Skin cancer     Unspecified hypertensive kidney disease with chronic kidney disease stage I through stage IV, or unspecified(403.90) 4/15/2015     Past Surgical History:   Procedure Laterality Date    CATARACT REMOVAL WITH IMPLANT Right 02/04/2020    [facility]    COLONOSCOPY  1/13/2012    diverticulosisbenign large intestinal mucosa with focal mild increase in lymphoplasmacytic content of lamina propria    COLONOSCOPY  3/30/2011    diverticulosis, tubular adenoma right colon, sigmoid colon-villotubular adenoma    HERNIA REPAIR Right     INTRACAPSULAR CATARACT EXTRACTION Right 2/4/2020    EYE CATARACT EMULSIFICATION IOL IMPLANT performed by Av Nick MD at [address] EXTRACTION Left 3/3/2020    EYE CATARACT EMULSIFICATION IOL IMPLANT  ENHANCED TORIC  LENS performed by Av Nick MD at [address] [facility] [address] Right      Family History   Problem Relation Age of Onset    Cancer Sister         female     Outpatient Medications Marked as Taking for the 4/13/21 encounter (Office Visit) with Balbir Ruggiero MD   Medication Sig Dispense Refill    amLODIPine (NORVASC) 10 MG tablet TAKE 1 TABLET BY MOUTH EVERY DAY      tamsulosin (FLOMAX) 0.4 MG capsule Take 1 capsule by mouth daily Take one capsule daily to facilitate passage of ureteral stone 90 capsule 3    probenecid (BENEMID) 500 MG tablet Take 500 mg by mouth 2 times daily      amLODIPine (NORVASC) 5 MG tablet Take 5 mg by mouth daily   0    metoprolol succinate (TOPROL XL) 50 MG extended release tablet Take 50 mg by mouth daily   3        Latex, Heparin, and Lovenox [enoxaparin sodium]  Social History     Tobacco Use   Smoking Status Former Smoker   Smokeless Tobacco Never Used      (If patient a smoker, smoking cessation counseling offered)   Social History     Substance and Sexual Activity   Alcohol Use Yes    Comment: sometimes        REVIEW OF SYSTEMS:  Review of Systems    Physical Exam:    This a 66 y.o. male   Vitals:    04/13/21 1520   BP: 133/63   Pulse: 85   Temp:      Body mass index is 26.36 kg/m². Physical Exam  Constitutional: Patient in no acute distress. Neuro: Alert and oriented to person, place and time. Psych: Mood normal, affect normal  Lungs:Respiratory effort is normal  Cardiovascular: Warm & Pink  Abdomen: Soft, non-tender, non-distendedwith no CVA,  No flank tenderness,  Orhepatosplenomegaly   Lymphatics: No palpable lymphadenopathy. Bladder non-tender and not distended. Musculoskeletal: Normal gait and station  Penis normal and circumcised  Urethral meatus normal  Scrotal exam normal  Testicles normal bilaterally  Epididymis normal bilaterally      Assessment and Plan      1. Weak urinary stream           Plan:        He has a weak stream.   Will start Flomax. PSA normal.   F/U in 3 months.      Prescriptions Ordered:  Orders Placed This Encounter   Medications    tamsulosin (FLOMAX) 0.4 MG capsule     Sig: Take 1 capsule by mouth daily Take one capsule daily to facilitate passage of ureteral stone     Dispense:  90 capsule     Refill:  3 Orders Placed:  No orders of the defined types were placed in this encounter. Xiomara Ames MD    Agree with the ROS entered by the MA.

## 2021-04-13 NOTE — LETTER
[address] [address]  Dept: [phone]  Dept Fax: [phone]        4/13/21    Patient: Jerome Castano  YOB: 1945    Dear Carmen Coulter MD,    I had the pleasure of seeing one of your patients, Beatriz Morin today in the office today. Below are the relevant portions of my assessment and plan of care. IMPRESSION:  1. Weak urinary stream        PLAN:  He has a weak stream.   Will start Flomax. PSA normal.   F/U in 3 months. Prescriptions Ordered:  Orders Placed This Encounter   Medications    tamsulosin (FLOMAX) 0.4 MG capsule     Sig: Take 1 capsule by mouth daily Take one capsule daily to facilitate passage of ureteral stone     Dispense:  90 capsule     Refill:  3      Orders Placed:  No orders of the defined types were placed in this encounter. Thank you for allowing me to participate in the care of this patient. I will keep you updated on this patient's follow up and I look forward to serving you and your patients again in the future.         Valerie Bray MD

## 2021-08-06 ENCOUNTER — OFFICE VISIT (OUTPATIENT)
Dept: UROLOGY | Age: 76
End: 2021-08-06
Payer: MEDICARE

## 2021-08-06 VITALS
DIASTOLIC BLOOD PRESSURE: 60 MMHG | HEIGHT: 69 IN | WEIGHT: 189 LBS | HEART RATE: 70 BPM | BODY MASS INDEX: 27.99 KG/M2 | SYSTOLIC BLOOD PRESSURE: 122 MMHG | TEMPERATURE: 97.5 F

## 2021-08-06 DIAGNOSIS — R39.12 WEAK URINARY STREAM: Primary | ICD-10-CM

## 2021-08-06 DIAGNOSIS — R35.1 NOCTURIA: ICD-10-CM

## 2021-08-06 PROCEDURE — G8427 DOCREV CUR MEDS BY ELIG CLIN: HCPCS | Performed by: UROLOGY

## 2021-08-06 PROCEDURE — 1123F ACP DISCUSS/DSCN MKR DOCD: CPT | Performed by: UROLOGY

## 2021-08-06 PROCEDURE — 1036F TOBACCO NON-USER: CPT | Performed by: UROLOGY

## 2021-08-06 PROCEDURE — G8417 CALC BMI ABV UP PARAM F/U: HCPCS | Performed by: UROLOGY

## 2021-08-06 PROCEDURE — 99214 OFFICE O/P EST MOD 30 MIN: CPT | Performed by: UROLOGY

## 2021-08-06 PROCEDURE — 4040F PNEUMOC VAC/ADMIN/RCVD: CPT | Performed by: UROLOGY

## 2021-08-06 ASSESSMENT — ENCOUNTER SYMPTOMS
WHEEZING: 0
NAUSEA: 0
COUGH: 0
EYE PAIN: 0
VOMITING: 0
DIARRHEA: 0
BACK PAIN: 0
EYE REDNESS: 0
SHORTNESS OF BREATH: 0
CONSTIPATION: 0
ABDOMINAL PAIN: 0

## 2021-08-06 NOTE — LETTER
[address] [address]  Dept: [phone]  Dept Fax: [phone]        8/6/21    Patient: Courtney Serrano  YOB: 1945    Dear Maddi Thayer MD,    I had the pleasure of seeing one of your patients, Amy Ornelas today in the office today. Below are the relevant portions of my assessment and plan of care. IMPRESSION:  1. Weak urinary stream    2. Nocturia        PLAN:  He is doing a bit better with Flomax. Will continue for now.,   Restrict fluids before bed. He is happy with voiding. F/U in 1 year  Return in about 1 year (around 8/6/2022). Prescriptions Ordered:  No orders of the defined types were placed in this encounter. Orders Placed:  No orders of the defined types were placed in this encounter. Thank you for allowing me to participate in the care of this patient. I will keep you updated on this patient's follow up and I look forward to serving you and your patients again in the future.         Mega Moore MD

## 2021-08-06 NOTE — PROGRESS NOTES
[address] [address]  Dept: [address] [facility] Urology Office Note - Established    Patient:  Lc Goodwin  YOB: 1945  Date: 8/6/2021    The patient is a 66 y.o. male who presents todayfor evaluation of the following problems:   Chief Complaint   Patient presents with    Follow-up     Three month follow up       HPI  He is here in follow up for a weak stream.  He was started on Flomax 3 months ago. He thinks it helped. He is up 2-3 times per night. Summary of old records: N/A    Additional History: N/A    Procedures Today: N/A    Urinalysis today:  No results found for this visit on 08/06/21. Last several PSA's:  Lab Results   Component Value Date    PSA 0.76 03/23/2021    PSA 0.46 01/30/2018    PSA 0.47 07/06/2016     Last total testosterone:  No results found for: TESTOSTERONE    AUA Symptom Score (8/6/2021):   INCOMPLETE EMPTYING: How often have you had the sensation of not emptying your bladder?: Not at all  FREQUENCY: How often do you have to urinate less than every two hours?: About Half the time  INTERMITTENCY: How often have you found you stopped and started again several times when you urinated?: Not at all  URGENCY: How often have you found it difficult to postpone urination?: Not at all  WEAK STREAM: How often have you had a weak urinary stream?: Not at all  STRAINING: How often have you had to strain to start  urination?: Not at all  NOCTURIA: How many times did you typically get up at night to uriniate?: 3 Times  TOTAL I-PSS SCORE[de-identified] 6  How would you feel if you were to spend the rest of your life with your urinary condition?: Mixe    Last BUN and creatinine:  Lab Results   Component Value Date    BUN 47 (H) 12/07/2020     Lab Results   Component Value Date    CREATININE 1.86 (H) 12/07/2020       Additional Lab/Culture results: none    Imaging Reviewed during this Office Visit: none  (results were independently reviewed by physician and radiology report verified)    PAST MEDICAL, FAMILY AND SOCIAL HISTORY UPDATE:  Past Medical History:   Diagnosis Date    [facility] [address]     Anemia     [facility]2020    [facility]    CKD (chronic kidney disease) stage 3, GFR 30-59 ml/min (HCC)     Depression     HTN (hypertension)     Silicosis ([facility] [address]     Skin cancer     Unspecified hypertensive kidney disease with chronic kidney disease stage I through stage IV, or unspecified(403.90) 4/15/2015     Past Surgical History:   Procedure Laterality Date    CATARACT REMOVAL WITH IMPLANT Right 02/04/2020    [facility]    COLONOSCOPY  1/13/2012    diverticulosisbenign large intestinal mucosa with focal mild increase in lymphoplasmacytic content of lamina propria    COLONOSCOPY  3/30/2011    diverticulosis, tubular adenoma right colon, sigmoid colon-villotubular adenoma    HERNIA REPAIR Right     INTRACAPSULAR CATARACT EXTRACTION Right 2/4/2020    EYE CATARACT EMULSIFICATION IOL IMPLANT performed by Lai Cortez MD at [address] [place] [address] Left 3/3/2020    EYE CATARACT EMULSIFICATION IOL IMPLANT  ENHANCED TORIC  LENS performed by Lai Cortez MD at [address] [place] [address] Right      Family History   Problem Relation Age of Onset    Cancer Sister         female     Outpatient Medications Marked as Taking for the 8/6/21 encounter (Office Visit) with Cachorro Ventura MD   Medication Sig Dispense Refill    amLODIPine (NORVASC) 10 MG tablet TAKE 1 TABLET BY MOUTH EVERY DAY      tamsulosin (FLOMAX) 0.4 MG capsule Take 1 capsule by mouth daily Take one capsule daily to facilitate passage of ureteral stone 90 capsule 3    probenecid (BENEMID) 500 MG tablet Take 500 mg by mouth 2 times daily      amLODIPine (NORVASC) 5 MG tablet Take 5 mg by mouth daily   0    metoprolol succinate (TOPROL XL) 50 MG extended release tablet Take 50 mg by mouth daily   3       Latex, Heparin, and Lovenox [enoxaparin sodium]  Social History     Tobacco Use   Smoking Status Former Smoker   Smokeless Tobacco Never Used     (Ifpatient a smoker, smoking cessation counseling offered)    Social History     Substance and Sexual Activity   Alcohol Use Yes    Comment: sometimes        REVIEW OF SYSTEMS:  Review of Systems    Physical Exam:      Vitals:    08/06/21 0840   BP: 122/60   Pulse: 70   Temp: 97.5 °F (36.4 °C)     Body mass index is 27.91 kg/m². Patient is a 66 y.o. male in no acute distress and alert and oriented to person, place and time. Physical Exam  Constitutional: Patient in no acute distress. Neuro: Alert and oriented to person, place and time. Psych: Mood normal, affect normal  Skin: No rash noted  Lungs: Respiratory effort is normal  Cardiovascular: Warm & Pink  Abdomen: Soft, non-tender, non-distended with no CVA,  No flank tenderness,  Or hepatosplenomegaly   Lymphatics: No palpablelymphadenopathy. Bladder non-tender and not distended. Musculoskeletal: Normal gait and station      Assessment and Plan      1. Weak urinary stream    2. Nocturia           Plan:          He is doing a bit better with Flomax. Will continue for now.,   Restrict fluids before bed. He is happy with voiding. F/U in 1 year  Return in about 1 year (around 8/6/2022). Prescriptions Ordered:  No orders of the defined types were placed in this encounter. Orders Placed:  No orders of the defined types were placed in this encounter. Bo Carmona MD    Agree with the ROS entered by the MA.

## 2021-08-06 NOTE — PROGRESS NOTES
Review of Systems   Constitutional: Negative for appetite change, chills and fatigue. Eyes: Negative for pain, redness and visual disturbance. Respiratory: Negative for cough, shortness of breath and wheezing. Cardiovascular: Negative for chest pain and leg swelling. Gastrointestinal: Negative for abdominal pain, constipation, diarrhea, nausea and vomiting. Genitourinary: Negative for difficulty urinating, dysuria, flank pain, frequency, hematuria and urgency. Musculoskeletal: Negative for back pain, joint swelling and myalgias. Skin: Negative for rash and wound. Neurological: Negative for dizziness, weakness and numbness. Hematological: Does not bruise/bleed easily. All other systems reviewed and are negative.

## 2021-10-01 ENCOUNTER — TELEPHONE (OUTPATIENT)
Dept: INFUSION THERAPY | Age: 76
End: 2021-10-01

## 2021-10-06 NOTE — TELEPHONE ENCOUNTER
Rec'd same home care orders again in the office. Discussed with Dr John Nelson, he states will review when he returns to office tomorrow.

## 2021-10-07 ENCOUNTER — TELEPHONE (OUTPATIENT)
Dept: INFUSION THERAPY | Age: 76
End: 2021-10-07

## 2021-10-07 NOTE — TELEPHONE ENCOUNTER
Received paperwork from [facility] for Dr Otto Garner to sign. Dr Otto Garner states he only sees the pt 1 x a year and does not feel he can sign the paperwork. Called and spoke to [facility] and they are going to send it on to management.

## 2021-11-10 ENCOUNTER — HOSPITAL ENCOUNTER (OUTPATIENT)
Age: 76
Discharge: HOME | End: 2021-11-10
Payer: MEDICARE

## 2021-11-10 LAB
ABSOLUTE EOS #: 0.12 K/UL (ref 0–0.4)
ABSOLUTE IMMATURE GRANULOCYTE: ABNORMAL K/UL (ref 0–0.3)
ABSOLUTE LYMPH #: 0.72 K/UL (ref 1–4.8)
ABSOLUTE MONO #: 0.24 K/UL (ref 0.1–1.3)
BASOPHILS # BLD: 1 % (ref 0–2)
BASOPHILS ABSOLUTE: 0.03 K/UL (ref 0–0.2)
DIFFERENTIAL TYPE: ABNORMAL
EOSINOPHILS RELATIVE PERCENT: 4 % (ref 0–4)
FERRITIN: 746 UG/L (ref 30–400)
FOLATE: 10.6 NG/ML
HCT VFR BLD CALC: 34.3 % (ref 41–53)
HEMOGLOBIN: 11.5 G/DL (ref 13.5–17.5)
IMMATURE GRANULOCYTES: ABNORMAL %
LYMPHOCYTES # BLD: 24 % (ref 24–44)
MCH RBC QN AUTO: 31.5 PG (ref 26–34)
MCHC RBC AUTO-ENTMCNC: 33.6 G/DL (ref 31–37)
MCV RBC AUTO: 93.6 FL (ref 80–100)
MONOCYTES # BLD: 8 % (ref 1–7)
MORPHOLOGY: ABNORMAL
NRBC AUTOMATED: ABNORMAL PER 100 WBC
PDW BLD-RTO: 15.9 % (ref 11.5–14.9)
PLATELET # BLD: 76 K/UL (ref 150–450)
PLATELET ESTIMATE: ABNORMAL
PMV BLD AUTO: 8.2 FL (ref 6–12)
RBC # BLD: 3.67 M/UL (ref 4.5–5.9)
RBC # BLD: ABNORMAL 10*6/UL
SEG NEUTROPHILS: 63 % (ref 36–66)
SEGMENTED NEUTROPHILS ABSOLUTE COUNT: 1.89 K/UL (ref 1.3–9.1)
VITAMIN B-12: 316 PG/ML (ref 232–1245)
WBC # BLD: 3 K/UL (ref 3.5–11)
WBC # BLD: ABNORMAL 10*3/UL

## 2021-11-10 PROCEDURE — 82746 ASSAY OF FOLIC ACID SERUM: CPT

## 2021-11-10 PROCEDURE — 82607 VITAMIN B-12: CPT

## 2021-11-10 PROCEDURE — 82728 ASSAY OF FERRITIN: CPT

## 2021-11-10 PROCEDURE — 36415 COLL VENOUS BLD VENIPUNCTURE: CPT

## 2021-11-10 PROCEDURE — 85025 COMPLETE CBC W/AUTO DIFF WBC: CPT

## 2021-12-02 ENCOUNTER — HOSPITAL ENCOUNTER (OUTPATIENT)
Age: 76
Discharge: HOME | End: 2021-12-02
Payer: MEDICARE

## 2021-12-02 ENCOUNTER — OFFICE VISIT (OUTPATIENT)
Dept: ONCOLOGY | Age: 76
End: 2021-12-02
Payer: MEDICARE

## 2021-12-02 ENCOUNTER — TELEPHONE (OUTPATIENT)
Dept: ONCOLOGY | Age: 76
End: 2021-12-02

## 2021-12-02 VITALS
TEMPERATURE: 97 F | BODY MASS INDEX: 28.49 KG/M2 | DIASTOLIC BLOOD PRESSURE: 65 MMHG | HEART RATE: 79 BPM | SYSTOLIC BLOOD PRESSURE: 127 MMHG | WEIGHT: 192.9 LBS

## 2021-12-02 DIAGNOSIS — R79.89 ELEVATED FERRITIN: ICD-10-CM

## 2021-12-02 DIAGNOSIS — D69.6 THROMBOCYTOPENIA (HCC): ICD-10-CM

## 2021-12-02 DIAGNOSIS — Z87.39 HX OF GOUT: ICD-10-CM

## 2021-12-02 DIAGNOSIS — N18.32 STAGE 3B CHRONIC KIDNEY DISEASE (HCC): ICD-10-CM

## 2021-12-02 DIAGNOSIS — R79.89 ELEVATED FERRITIN: Primary | ICD-10-CM

## 2021-12-02 LAB
ABSOLUTE EOS #: 0.1 K/UL (ref 0–0.4)
ABSOLUTE IMMATURE GRANULOCYTE: ABNORMAL K/UL (ref 0–0.3)
ABSOLUTE LYMPH #: 0.7 K/UL (ref 1–4.8)
ABSOLUTE MONO #: 0.4 K/UL (ref 0.1–1.2)
BASOPHILS # BLD: 0 % (ref 0–2)
BASOPHILS ABSOLUTE: 0 K/UL (ref 0–0.2)
DIFFERENTIAL TYPE: ABNORMAL
EOSINOPHILS RELATIVE PERCENT: 2 % (ref 1–4)
FERRITIN: 658 UG/L (ref 30–400)
HCT VFR BLD CALC: 34.5 % (ref 41–53)
HEMOGLOBIN: 11.6 G/DL (ref 13.5–17.5)
IMMATURE GRANULOCYTES: ABNORMAL %
LYMPHOCYTES # BLD: 16 % (ref 24–44)
MCH RBC QN AUTO: 31.3 PG (ref 26–34)
MCHC RBC AUTO-ENTMCNC: 33.6 G/DL (ref 31–37)
MCV RBC AUTO: 93.2 FL (ref 80–100)
MONOCYTES # BLD: 8 % (ref 2–11)
NRBC AUTOMATED: ABNORMAL PER 100 WBC
PDW BLD-RTO: 15.7 % (ref 12.5–15.4)
PLATELET # BLD: 105 K/UL (ref 140–450)
PLATELET ESTIMATE: ABNORMAL
PMV BLD AUTO: 8 FL (ref 6–12)
RBC # BLD: 3.71 M/UL (ref 4.5–5.9)
RBC # BLD: ABNORMAL 10*6/UL
SEG NEUTROPHILS: 74 % (ref 36–66)
SEGMENTED NEUTROPHILS ABSOLUTE COUNT: 3.1 K/UL (ref 1.8–7.7)
WBC # BLD: 4.2 K/UL (ref 3.5–11)
WBC # BLD: ABNORMAL 10*3/UL

## 2021-12-02 PROCEDURE — 1123F ACP DISCUSS/DSCN MKR DOCD: CPT | Performed by: INTERNAL MEDICINE

## 2021-12-02 PROCEDURE — G8484 FLU IMMUNIZE NO ADMIN: HCPCS | Performed by: INTERNAL MEDICINE

## 2021-12-02 PROCEDURE — 1036F TOBACCO NON-USER: CPT | Performed by: INTERNAL MEDICINE

## 2021-12-02 PROCEDURE — 99214 OFFICE O/P EST MOD 30 MIN: CPT | Performed by: INTERNAL MEDICINE

## 2021-12-02 PROCEDURE — G8417 CALC BMI ABV UP PARAM F/U: HCPCS | Performed by: INTERNAL MEDICINE

## 2021-12-02 PROCEDURE — 82728 ASSAY OF FERRITIN: CPT

## 2021-12-02 PROCEDURE — 36415 COLL VENOUS BLD VENIPUNCTURE: CPT

## 2021-12-02 PROCEDURE — 85025 COMPLETE CBC W/AUTO DIFF WBC: CPT

## 2021-12-02 PROCEDURE — 4040F PNEUMOC VAC/ADMIN/RCVD: CPT | Performed by: INTERNAL MEDICINE

## 2021-12-02 PROCEDURE — G8427 DOCREV CUR MEDS BY ELIG CLIN: HCPCS | Performed by: INTERNAL MEDICINE

## 2021-12-02 NOTE — TELEPHONE ENCOUNTER
AVS from 12/2/21     Need cbc and ferritin today rv in 6 months, need cbc and ferritin prior to RV       Labs drawn today at md visit    RV scheduled 6/2/22 @ 1:30pm    PT will have labs drawn one week prior to return visit     PT was given AVS and an appt schedule    Electronically signed by Ingrid Arteaga on 12/2/2021 at 3:09 PM

## 2021-12-02 NOTE — PROGRESS NOTES
DIAGNOSIS:   1. 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 But most of the elevation is related to acute inflammatory arthritis. 2. Anemia and thrombocytopenia. Concerning features - due to splenomegaly. 3. gouty arthritis with multiple subcutaneous nodules    CURRENT THERAPY:  Received therapeutic phlebotomy when the ferritin was above 1000 but ferritin improved significantly after control of arthritis  Started on Probenicid     BRIEF CASE HISTORY:  Ji Edwards is a very pleasant 64 y.o. male who is referred to us for elevated ferritin. He has history of CKD, stable. He has history of gout, gouty arthritis. He consumes alcohol rarely. CBC also showed anemia and thrombocytopenia. Follow up testing showed recovery of his hemoglobin but his platelets remain low. Work up done 01/29/2018 showed continued elevated ferritin and heterozygous mutation for H63D, his uric acid was elevated. He was started Probenecid. He was started on phlebotomy but things got much better with control of his gouty arthritis. INTERIM HISTORY: The patient presents to review for elevated ferritin and to review lab work. He reports he feels well overall and denies any new complaints or concerns. He had an injury to his finger complicated by osteomyelitis. He received IV antibiotic. During the treatment, he had a CBC which showed further drop in his hemoglobin white count and platelets. He was referred back to us. The infection is completely healed, he finished antibiotics, he feels well. PAST MEDICAL HISTORY: has a past medical history of Acute renal failure ([address] Anemia, Catalepsy, CKD (chronic kidney disease) stage 3, GFR 30-59 ml/min ([facility]), Depression, HTN (hypertension), Silicosis ([address] Skin cancer, and Unspecified hypertensive kidney disease with chronic kidney disease stage I through stage IV, or unspecified(403.90).     PAST SURGICAL HISTORY: has a past surgical history that includes Rotator cuff repair (Right); Lung biopsy; Colonoscopy (1/13/2012); Colonoscopy (3/30/2011); hernia repair (Right); Cataract removal with implant (Right, 02/04/2020); Intracapsular cataract extraction (Right, 2/4/2020); and Intracapsular cataract extraction (Left, 3/3/2020). CURRENT MEDICATIONS:  has a current medication list which includes the following prescription(s): amlodipine, tamsulosin, probenecid, amlodipine, and metoprolol succinate. ALLERGIES:  is allergic to latex, heparin, and lovenox [enoxaparin sodium]. FAMILY HISTORY: Negative for any hematological or oncological conditions. SOCIAL HISTORY:  reports that he has quit smoking. He has never used smokeless tobacco. He reports current alcohol use. He reports that he does not use drugs. REVIEW OF SYSTEMS:   General: No fever or night sweats. Weight is stable. ENT: No double or blurred vision, no tinnitus or hearing problem, no dysphagia or sore throat   Respiratory: No chest pain, no shortness of breath, no cough or hemoptysis. Cardiovascular: Denies chest pain, PND or orthopnea. No L E swelling or palpitations. Gastrointestinal: No nausea or vomiting, abdominal pain, diarrhea or constipation. Genitourinary: Denies dysuria, hematuria, frequency, urgency or incontinence. Neurological: Denies headaches, decreased LOC, no sensory or motor focal deficits. Musculoskeletal: No arthralgia no back pain or joint swelling. Gouty arthritis with multiple nodules - improved  Skin: There are no rashes or bleeding. Psychiatric: No anxiety, no depression. Endocrine: No diabetes or thyroid disease. Hematologic: No bleeding, no adenopathy. PHYSICAL EXAM: Shows a well appearing 64y.o.-year-old male who is not in pain or distress. Vital Signs: Blood pressure 127/65, pulse 79, temperature 97 °F (36.1 °C), temperature source Temporal, weight 192 lb 14.4 oz (87.5 kg). HEENT: Normocephalic and atraumatic.  Pupils are equal, round, reactive to light and accommodation. Extraocular muscles are intact. Neck: Showed no JVD, no carotid bruit . Lungs: Clear to auscultation bilaterally. Heart: Regular without any murmur. Abdomen: Soft, nontender. No hepatosplenomegaly. Extremities: Gouty arthritis on the hands with multiple tophi. Lower extremities show no edema, clubbing, or cyanosis. Neuro exam: intact cranial nerves bilaterally no motor or sensory deficit, gait is normal. Lymphatic: no adenopathy appreciated in the supraclavicular, axillary, cervical or inguinal area    REVIEW OF LABORATORY DATA:   Lab Results   Component Value Date    WBC 3.0 (L) 11/10/2021    HGB 11.5 (L) 11/10/2021    HCT 34.3 (L) 11/10/2021    MCV 93.6 11/10/2021    PLT 76 (L) 11/10/2021       Chemistry        Component Value Date/Time     12/07/2020 0756    K 5.0 12/07/2020 0756     12/07/2020 0756    CO2 23 12/07/2020 0756    BUN 47 (H) 12/07/2020 0756    CREATININE 1.86 (H) 12/07/2020 0756        Component Value Date/Time    CALCIUM 9.5 12/07/2020 0756    ALKPHOS 100 12/07/2020 0756    AST 33 12/07/2020 0756    ALT 22 12/07/2020 0756    BILITOT 0.47 12/07/2020 0756        Lab Results   Component Value Date    IRON 87 01/29/2019    TIBC 272 01/29/2019    FERRITIN 746 (H) 11/10/2021     Results for Truddie Comp \"VAL\" (MRN [id number]) as of 4/25/2019 15:44   Ref. Range 1/29/2019 14:46 3/20/2019 07:41 3/28/2019 08:24   Uric Acid Latest Ref Range: 3.4 - 7.0 mg/dL 9.1 (H)  6.8         REVIEW OF RADIOLOGICAL RESULTS:  12/15/2020 US RENAL IMPRESSION:      Multiple bilateral simple renal cysts.  No follow-up of the cysts is needed.       Evidence of mild medical renal disease         IMPRESSION:   1. Elevated ferritin-most likely related to chronic gouty arthritis but there is a component of iron overload secondary to H63D mutation. Likely a minor component. 2. Thrombocytopenia.   3. Hx of normocytic anemia, likely secondary to chronic arthritis  4. HX gout, CKD  5. 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 Elevated uric acid - started on Probenecid 02/2019  7. Drop in hemoglobin and platelets, likely secondary to acute infection. PLAN:   1. We reviewed his lab work, overall his counts are stable, ferritin is staying in the 600-700 range. 2. Blood counts completely recovered to baseline. 3. Continue observation, no need to intervene as long as ferritin remains stable   4. I am ordering iron studies to be done today. And prior to next visit   5. We will continue with conservative surveillance.    6. Return in 6 months

## 2021-12-22 ENCOUNTER — HOSPITAL ENCOUNTER (OUTPATIENT)
Dept: GENERAL RADIOLOGY | Age: 76
Discharge: HOME | End: 2021-12-24
Payer: MEDICARE

## 2021-12-22 ENCOUNTER — HOSPITAL ENCOUNTER (OUTPATIENT)
Age: 76
Discharge: HOME | End: 2021-12-24
Payer: MEDICARE

## 2021-12-22 DIAGNOSIS — J84.89 BOOP (BRONCHIOLITIS OBLITERANS WITH ORGANIZING PNEUMONIA) (HCC): ICD-10-CM

## 2021-12-22 PROCEDURE — 71046 X-RAY EXAM CHEST 2 VIEWS: CPT

## 2022-01-26 ENCOUNTER — OFFICE VISIT (OUTPATIENT)
Dept: GASTROENTEROLOGY | Age: 77
End: 2022-01-26
Payer: MEDICARE

## 2022-01-26 VITALS
SYSTOLIC BLOOD PRESSURE: 130 MMHG | TEMPERATURE: 96.9 F | WEIGHT: 192 LBS | DIASTOLIC BLOOD PRESSURE: 62 MMHG | BODY MASS INDEX: 28.35 KG/M2 | HEART RATE: 74 BPM

## 2022-01-26 DIAGNOSIS — Z86.010 H/O ADENOMATOUS POLYP OF COLON: ICD-10-CM

## 2022-01-26 DIAGNOSIS — R13.19 ESOPHAGEAL DYSPHAGIA: Primary | ICD-10-CM

## 2022-01-26 PROCEDURE — G8427 DOCREV CUR MEDS BY ELIG CLIN: HCPCS | Performed by: INTERNAL MEDICINE

## 2022-01-26 PROCEDURE — 1036F TOBACCO NON-USER: CPT | Performed by: INTERNAL MEDICINE

## 2022-01-26 PROCEDURE — 4040F PNEUMOC VAC/ADMIN/RCVD: CPT | Performed by: INTERNAL MEDICINE

## 2022-01-26 PROCEDURE — 1123F ACP DISCUSS/DSCN MKR DOCD: CPT | Performed by: INTERNAL MEDICINE

## 2022-01-26 PROCEDURE — 99203 OFFICE O/P NEW LOW 30 MIN: CPT | Performed by: INTERNAL MEDICINE

## 2022-01-26 PROCEDURE — G8417 CALC BMI ABV UP PARAM F/U: HCPCS | Performed by: INTERNAL MEDICINE

## 2022-01-26 PROCEDURE — G8484 FLU IMMUNIZE NO ADMIN: HCPCS | Performed by: INTERNAL MEDICINE

## 2022-01-26 RX ORDER — BISACODYL 5 MG
TABLET, DELAYED RELEASE (ENTERIC COATED) ORAL
Qty: 2 TABLET | Refills: 0 | Status: SHIPPED | OUTPATIENT
Start: 2022-01-26 | End: 2022-03-24 | Stop reason: ALTCHOICE

## 2022-01-26 RX ORDER — POLYETHYLENE GLYCOL 3350 17 G/17G
POWDER, FOR SOLUTION ORAL
Qty: 238 G | Refills: 0 | Status: SHIPPED | OUTPATIENT
Start: 2022-01-26 | End: 2022-03-24 | Stop reason: ALTCHOICE

## 2022-01-26 NOTE — PROGRESS NOTES
GI OFFICE FOLLOW UP    INTERVAL HISTORY:   Montserrat Cuba MD  [facility] [address] [facility],  [address] [facility]    Chief Complaint   Patient presents with    New Patient     Patient is a new return here to discuss a possible Conoloscopy. Patient states does not have any complaints    Gastroesophageal Reflux       1. Esophageal dysphagia    2. H/O adenomatous polyp of colon          Last time patient was seen by me was in February 2014. HISTORY OF PRESENT ILLNESS: Ramon Virgen is a 64 y.o. male with a past history remarkable for ,   Patient seen with a history of dysphagia. He does have intermittent dysphagia for solids. No symptoms with the liquids. No symptom of complete obstruction of esophagus. He does have GERD symptoms. No symptoms suggestive of extra esophageal manifestation of GERD. No symptoms suggestive of complete obstruction of esophagus. Has a good appetite and there is no weight loss. Patient had similar symptoms in the past and had a EGD done in 2014 and no significant findings identified at that time. By reviewing his previous records his last colonoscopy was about 11 years ago. He was removed to have huge polypoid lesions in the sigmoid colon that were removed in the past.  Patient supposed to have colonoscopy for follow-up in the past and he did not follow that. At present he denies abdominal pain, bloating, nausea vomiting. Bowel movements satisfactory without melena or hematochezia. Past Medical,Family, and Social History reviewed and does contribute to the patient presenting condition. Patient's PMH/PSH,SH,PSYCH Hx, MEDs, ALLERGIES, and ROS were all reviewed and updated in the appropriate sections.  Yes      PAST MEDICAL HISTORY:  Past Medical History:   Diagnosis Date    Acute renal failure (HCC)     Anemia     Catalepsy 03/03/2020    [facility]affomichael/Nirali    CKD (chronic kidney disease) stage 3, GFR 30-59 ml/min (HCC)     Depression     HTN (hypertension)     Silicosis ([address]     Skin cancer     Unspecified hypertensive kidney disease with chronic kidney disease stage I through stage IV, or unspecified(403.90) 4/15/2015       Past Surgical History:   Procedure Laterality Date    [facility]    COLONOSCOPY  1/13/2012    diverticulosisbenign large intestinal mucosa with focal mild increase in lymphoplasmacytic content of lamina propria    COLONOSCOPY  3/30/2011    diverticulosis, tubular adenoma right colon, sigmoid colon-villotubular adenoma    HERNIA REPAIR Right     INTRACAPSULAR CATARACT EXTRACTION Right 2/4/2020    EYE CATARACT EMULSIFICATION IOL IMPLANT performed by Jillian Antony MD at [address] [place] [address] Left 3/3/2020    EYE CATARACT EMULSIFICATION IOL IMPLANT  ENHANCED TORIC  LENS performed by Jillian Antony MD at [address] [facility] [place] Right        CURRENT MEDICATIONS:    Current Outpatient Medications:     amLODIPine (NORVASC) 10 MG tablet, TAKE 1 TABLET BY MOUTH EVERY DAY, Disp: , Rfl:     tamsulosin (FLOMAX) 0.4 MG capsule, Take 1 capsule by mouth daily Take one capsule daily to facilitate passage of ureteral stone, Disp: 90 capsule, Rfl: 3    probenecid (BENEMID) 500 MG tablet, Take 500 mg by mouth 2 times daily, Disp: , Rfl:     amLODIPine (NORVASC) 5 MG tablet, Take 5 mg by mouth daily , Disp: , Rfl: 0    metoprolol succinate (TOPROL XL) 50 MG extended release tablet, Take 50 mg by mouth daily , Disp: , Rfl: 3    ALLERGIES:   Allergies   Allergen Reactions    Latex     Heparin     Lovenox [Enoxaparin Sodium]        FAMILY HISTORY:       Problem Relation Age of Onset    Cancer Sister         female         SOCIAL HISTORY:   Social History     Socioeconomic History    Marital status:      Spouse name: Not on file    Number of children: Not on file    Years of education: Not on file    Highest education level: Not on file   Occupational History    Not on file   Tobacco Use    Smoking status: Former Smoker    Smokeless tobacco: Never Used   Vaping Use    Vaping Use: Never used   Substance and Sexual Activity    Alcohol use: Yes     Comment: sometimes     Drug use: No    Sexual activity: Not on file   Other Topics Concern    Not on file   Social History Narrative    Not on file     Social Determinants of Health     Financial Resource Strain:     Difficulty of Paying Living Expenses: Not on file   Food Insecurity:     Worried About [address] [employer] [address] in the Last Year: Not on file    [address] in the Last Year: Not on file   Transportation Needs:     Lack of Transportation (Medical): Not on file    Lack of Transportation (Non-Medical): Not on file   Physical Activity:     Days of Exercise per Week: Not on file    Minutes of Exercise per Session: Not on file   Stress:     Feeling of Stress : Not on file   Social Connections:     Frequency of Communication with Friends and Family: Not on file    Frequency of Social Gatherings with Friends and Family: Not on file    Attends Caoda[employer] Services: Not on file    Active Member of [address] [place] [address] or Organizations: Not on file    Attends Club or Organization Meetings: Not on file    Marital Status: Not on file   Intimate Partner Violence:     Fear of Current or Ex-Partner: Not on file    Emotionally Abused: Not on file    Physically Abused: Not on file    Sexually Abused: Not on file   Housing Stability:     Unable to Pay for Housing in the Last Year: Not on file    Number of [place] in the Last Year: Not on file    Unstable Housing in the Last Year: Not on file         REVIEW OF SYSTEMS:         Review of Systems    PHYSICAL EXAMINATION:     Vital signs reviewed per the nursing documentation.      /62 Pulse 74   Temp 96.9 °F (36.1 °C)   Wt 192 lb (87.1 kg)   BMI 28.35 kg/m²   Body mass index is 28.35 kg/m². Physical Exam  Vitals reviewed. Constitutional:       Appearance: Normal appearance. HENT:      Head: Normocephalic and atraumatic. Eyes:      Extraocular Movements: Extraocular movements intact. Conjunctiva/sclera: Conjunctivae normal.   Cardiovascular:      Rate and Rhythm: Normal rate and regular rhythm. Heart sounds: Normal heart sounds. Pulmonary:      Effort: Pulmonary effort is normal.      Breath sounds: Normal breath sounds. Abdominal:      General: Bowel sounds are normal. There is no distension. Palpations: Abdomen is soft. There is no mass. Tenderness: There is no abdominal tenderness. There is no guarding. Hernia: No hernia is present. Skin:     General: Skin is warm and dry. Neurological:      General: No focal deficit present. Mental Status: He is alert and oriented to person, place, and time.    Psychiatric:         Mood and Affect: Mood normal.         Behavior: Behavior normal.           LABORATORY DATA: Reviewed  Lab Results   Component Value Date    WBC 4.2 12/02/2021    HGB 11.6 (L) 12/02/2021    HCT 34.5 (L) 12/02/2021    MCV 93.2 12/02/2021     (L) 12/02/2021     12/07/2020    K 5.0 12/07/2020     12/07/2020    CO2 23 12/07/2020    BUN 47 (H) 12/07/2020    CREATININE 1.86 (H) 12/07/2020    LABALBU 4.1 12/07/2020    BILITOT 0.47 12/07/2020    ALKPHOS 100 12/07/2020    AST 33 12/07/2020    ALT 22 12/07/2020         Lab Results   Component Value Date    RBC 3.71 (L) 12/02/2021    HGB 11.6 (L) 12/02/2021    MCV 93.2 12/02/2021    MCH 31.3 12/02/2021    MCHC 33.6 12/02/2021    RDW 15.7 (H) 12/02/2021    MPV 8.0 12/02/2021    BASOPCT 0 12/02/2021    LYMPHSABS 0.70 (L) 12/02/2021    MONOSABS 0.40 12/02/2021    NEUTROABS 3.10 12/02/2021    EOSABS 0.10 12/02/2021    BASOSABS 0.00 12/02/2021         DIAGNOSTIC TESTING:     No results found. Assessment  1. Esophageal dysphagia    2. H/O adenomatous polyp of colon        Plan  Patient looks stable. He does have dysphagia. Not clear whether this is secondary to esophageal motility issues. However given his age, need to evaluate esophagitis, stricture etc.    He is known to have huge colon polyps that were removed more than 11 years ago. May need colonoscopy and this is advised. Discussed with the patient regarding EGD colonoscopy procedures, adequate preparation, risks and benefits. Patient understood and agreed for follow-up. He verbalized consent for the procedures. Thank you for allowing me to participate in the care of Mr. Cara Herrera. For any further questions please do not hesitate to contact me. I have reviewed and agree with the ROS entered by the MA/LPN. Note is dictated utilizing voice recognition software. Unfortunately this leads to occasional typographical errors.  Please contact our office if you have any questions        Mason Coburn MD,FACP, [facility]  Board Certified in Gastroenterology and [address] [place] [address] Gastroenterology  Office #: [phone]

## 2022-01-28 ENCOUNTER — HOSPITAL ENCOUNTER (OUTPATIENT)
Dept: PREADMISSION TESTING | Age: 77
Discharge: HOME | End: 2022-02-01
Payer: MEDICARE

## 2022-01-28 VITALS
SYSTOLIC BLOOD PRESSURE: 137 MMHG | HEART RATE: 85 BPM | DIASTOLIC BLOOD PRESSURE: 73 MMHG | RESPIRATION RATE: 16 BRPM | TEMPERATURE: 97.5 F | HEIGHT: 71 IN | WEIGHT: 192 LBS | BODY MASS INDEX: 26.88 KG/M2 | OXYGEN SATURATION: 100 %

## 2022-01-28 LAB
ABSOLUTE EOS #: 0.1 K/UL (ref 0–0.4)
ABSOLUTE IMMATURE GRANULOCYTE: ABNORMAL K/UL (ref 0–0.3)
ABSOLUTE LYMPH #: 0.8 K/UL (ref 1–4.8)
ABSOLUTE MONO #: 0.3 K/UL (ref 0.1–1.3)
ANION GAP SERPL CALCULATED.3IONS-SCNC: 10 MMOL/L (ref 9–17)
BASOPHILS # BLD: 1 % (ref 0–2)
BASOPHILS ABSOLUTE: 0 K/UL (ref 0–0.2)
BUN BLDV-MCNC: 31 MG/DL (ref 8–23)
BUN/CREAT BLD: ABNORMAL (ref 9–20)
CALCIUM SERPL-MCNC: 9.1 MG/DL (ref 8.6–10.4)
CHLORIDE BLD-SCNC: 108 MMOL/L (ref 98–107)
CO2: 23 MMOL/L (ref 20–31)
CREAT SERPL-MCNC: 1.64 MG/DL (ref 0.7–1.2)
DIFFERENTIAL TYPE: ABNORMAL
EOSINOPHILS RELATIVE PERCENT: 3 % (ref 0–4)
GFR AFRICAN AMERICAN: 49 ML/MIN
GFR NON-AFRICAN AMERICAN: 41 ML/MIN
GFR SERPL CREATININE-BSD FRML MDRD: ABNORMAL ML/MIN/{1.73_M2}
GFR SERPL CREATININE-BSD FRML MDRD: ABNORMAL ML/MIN/{1.73_M2}
GLUCOSE BLD-MCNC: 115 MG/DL (ref 70–99)
HCT VFR BLD CALC: 37.9 % (ref 41–53)
HEMOGLOBIN: 12.7 G/DL (ref 13.5–17.5)
IMMATURE GRANULOCYTES: ABNORMAL %
LYMPHOCYTES # BLD: 22 % (ref 24–44)
MCH RBC QN AUTO: 30.7 PG (ref 26–34)
MCHC RBC AUTO-ENTMCNC: 33.5 G/DL (ref 31–37)
MCV RBC AUTO: 91.4 FL (ref 80–100)
MONOCYTES # BLD: 7 % (ref 1–7)
NRBC AUTOMATED: ABNORMAL PER 100 WBC
PDW BLD-RTO: 14.2 % (ref 11.5–14.9)
PLATELET # BLD: 102 K/UL (ref 150–450)
PLATELET ESTIMATE: ABNORMAL
PMV BLD AUTO: 8 FL (ref 6–12)
POTASSIUM SERPL-SCNC: 4.5 MMOL/L (ref 3.7–5.3)
RBC # BLD: 4.15 M/UL (ref 4.5–5.9)
RBC # BLD: ABNORMAL 10*6/UL
SEG NEUTROPHILS: 67 % (ref 36–66)
SEGMENTED NEUTROPHILS ABSOLUTE COUNT: 2.4 K/UL (ref 1.3–9.1)
SODIUM BLD-SCNC: 141 MMOL/L (ref 135–144)
WBC # BLD: 3.6 K/UL (ref 3.5–11)
WBC # BLD: ABNORMAL 10*3/UL

## 2022-01-28 PROCEDURE — 85025 COMPLETE CBC W/AUTO DIFF WBC: CPT

## 2022-01-28 PROCEDURE — 36415 COLL VENOUS BLD VENIPUNCTURE: CPT

## 2022-01-28 PROCEDURE — 80048 BASIC METABOLIC PNL TOTAL CA: CPT

## 2022-01-28 PROCEDURE — 93005 ELECTROCARDIOGRAM TRACING: CPT | Performed by: NURSE PRACTITIONER

## 2022-01-28 ASSESSMENT — ENCOUNTER SYMPTOMS
DIARRHEA: 0
CONSTIPATION: 0
COUGH: 0
BACK PAIN: 0
WHEEZING: 0
SHORTNESS OF BREATH: 0
VOMITING: 0
SORE THROAT: 0
NAUSEA: 0
ABDOMINAL PAIN: 0

## 2022-01-28 NOTE — H&P
HISTORY and [facility] [id number]       NAME:  Lexis Pickens  MRN: [id number]   YOB: 1945   Date: 1/28/2022   Age: 64 y.o. Gender: male       COMPLAINT AND PRESENT HISTORY:     Lexis Pickens is 64 y.o.  male, here for preanesthesia testing for diagnostic colonoscopy and EGD. Pt has had previous colonoscopy last in 2012. History of adenomatous polyp. Pt has had previous EGD last in 2014. History of dysphagia. Pt reports intermittent dysphagia particularly with dry foods such as breads. He reports if he drinks water it does go down. Denies dysphagia with pills or liquids. Pt has intermittent GERD symptoms. He does not take PPI or antacids. Denies abdominal pain, nausea, vomiting, diarrhea, constipation, hematochezia and melena. Denies decreased appetite and unexpected weight loss. Pt unaware of any Family Hx of colon cancer. PMHx includes:    HTN: Takes amlodipine, Toprol XL. Denies chest pain/pressure, palpitations, SOB, dizziness, headaches, lightheadedness, lower extremity swelling and blurred vision. BP Readings from Last 3 Encounters:   01/28/22 137/73   01/26/22 130/62   12/02/21 127/65       CKD: He does not see a nephrologist.     Lab Results   Component Value Date     01/28/2022    K 4.5 01/28/2022     01/28/2022    CO2 23 01/28/2022    BUN 31 01/28/2022    CREATININE 1.64 01/28/2022    GLUCOSE 115 01/28/2022    GLUCOSE 119 01/24/2012    CALCIUM 9.1 01/28/2022        Thrombocytopenia: Stable. Sees Dr. Ryan Vilchis for hematology. Denies bleeding, easy bruising. Lab Results   Component Value Date    WBC 3.6 01/28/2022    HGB 12.7 (L) 01/28/2022    HCT 37.9 (L) 01/28/2022    MCV 91.4 01/28/2022     (L) 09/91/4435     Silicosis: Sees Dr. Hsu  for pulmonology. Denies SOB, cough. Denies personal and family history of complications with anesthesia.      PAST MEDICAL HISTORY     Past Medical History:   Diagnosis Date    [facility]     Anemia     [facility]2020    [facility]    CKD (chronic kidney disease) stage 3, GFR 30-59 ml/min (HCC)     Depression     Gout     HTN (hypertension)     Silicosis ([facility] [address]     Skin cancer     Thrombocytopenia ([facility] [address]     Unspecified hypertensive kidney disease with chronic kidney disease stage I through stage IV, or unspecified(403.90) 4/15/2015       SURGICAL HISTORY       Past Surgical History:   Procedure Laterality Date    CATARACT REMOVAL WITH IMPLANT Right [facility]04[facility]2020    [facility]    COLONOSCOPY  1/13/2012    diverticulosisbenign large intestinal mucosa with focal mild increase in lymphoplasmacytic content of lamina propria    COLONOSCOPY  3/30/2011    diverticulosis, tubular adenoma right colon, sigmoid colon-villotubular adenoma    FINGER SURGERY Right     middle finger    HERNIA REPAIR Right     INTRACAPSULAR CATARACT EXTRACTION Right 2/4/2020    EYE CATARACT EMULSIFICATION IOL IMPLANT performed by Juan Huizar MD at [address] [place] [address] Left 3/3/2020    EYE CATARACT EMULSIFICATION IOL IMPLANT  ENHANCED TORIC  LENS performed by Juan Huizar MD at [facility] Right        FAMILY HISTORY       Family History   Problem Relation Age of Onset    Cancer Sister         female       SOCIAL HISTORY       Social History     Socioeconomic History    Marital status:      Spouse name: None    Number of children: None    Years of education: None    Highest education level: None   Occupational History    None   Tobacco Use    Smoking status: Former Smoker    Smokeless tobacco: Never Used   Vaping Use    Vaping Use: Never used   Substance and Sexual Activity    Alcohol use: Yes     Comment: sometimes     Drug use: No    Sexual activity: None   Other Topics Concern    None   Social History Narrative    None     Social Determinants of Health     Financial Resource Strain:     Difficulty of Paying Living Expenses: Not on file   Food Insecurity:     Worried About Running Out of Food in the Last Year: Not on file    Damon of Food in the Last Year: Not on file   Transportation Needs:     Lack of Transportation (Medical): Not on file    Lack of Transportation (Non-Medical): Not on file   Physical Activity:     Days of Exercise per Week: Not on file    Minutes of Exercise per Session: Not on file   Stress:     Feeling of Stress : Not on file   Social Connections:     Frequency of Communication with Friends and Family: Not on file    Frequency of Social Gatherings with Friends and Family: Not on file    Attends Sabianist Services: Not on file    Active Member of [address] [place] [address] [employer] or Organizations: Not on file    Attends Club or Organization Meetings: Not on file    Marital Status: Not on file   Intimate Partner Violence:     Fear of Current or Ex-Partner: Not on file    Emotionally Abused: Not on file    Physically Abused: Not on file    Sexually Abused: Not on file   Housing Stability:     Unable to Pay for Housing in the Last Year: Not on file    Number of [place] in the Last Year: Not on file    Unstable Housing in the Last Year: Not on file        REVIEW OF SYSTEMS      Allergies   Allergen Reactions    Latex     Heparin     Lovenox [Enoxaparin Sodium]        Current Outpatient Medications on File Prior to Encounter   Medication Sig Dispense Refill    tamsulosin (FLOMAX) 0.4 MG capsule Take 1 capsule by mouth daily Take one capsule daily to facilitate passage of ureteral stone 90 capsule 3    amLODIPine (NORVASC) 5 MG tablet Take 5 mg by mouth daily   0    metoprolol succinate (TOPROL XL) 50 MG extended release tablet Take 50 mg by mouth daily   3    polyethylene glycol (GLYCOLAX) 17 GM/SCOOP powder Follow instructions provided to you from physician's office.  238 g 0    bisacodyl (BISACODYL) 5 MG EC tablet Follow instructions provided given by the physician's office. 2 tablet 0     No current facility-administered medications on file prior to encounter. Review of Systems   Constitutional: Negative for appetite change, chills, fever and unexpected weight change. HENT: Negative for congestion and sore throat. Eyes: Negative for visual disturbance. Respiratory: Negative for cough, shortness of breath and wheezing. Cardiovascular: Negative for chest pain, palpitations and leg swelling. Gastrointestinal: Negative for abdominal pain, constipation, diarrhea, nausea and vomiting. See HPI   Genitourinary: Negative. Musculoskeletal: Positive for joint swelling (History of gout). Negative for back pain and neck pain. Skin: Negative for rash and wound. Neurological: Negative for dizziness, speech difficulty, light-headedness and headaches. Hematological: Bruises/bleeds easily (History of thrombocytopenia). Psychiatric/Behavioral: Negative. GENERAL PHYSICAL EXAM     Vitals: /73   Pulse 85   Temp 97.5 °F (36.4 °C)   Resp 16   Ht 5' 11\" (1.803 m)   Wt 192 lb (87.1 kg)   SpO2 100%   BMI 26.78 kg/m²  Body mass index is 26.78 kg/m². Physical Exam  Constitutional:       General: He is not in acute distress. Appearance: He is well-developed. He is not ill-appearing, toxic-appearing or diaphoretic. HENT:      Head: Normocephalic and atraumatic. Nose: Nose normal. No congestion. Mouth/Throat:      Mouth: Mucous membranes are moist.      Pharynx: Oropharynx is clear. No oropharyngeal exudate or posterior oropharyngeal erythema. Eyes:      General: No scleral icterus. Right eye: No discharge. Left eye: No discharge. Conjunctiva/sclera: Conjunctivae normal.      Pupils: Pupils are equal, round, and reactive to light. Neck:      Trachea: No tracheal deviation. Cardiovascular:      Rate and Rhythm: Normal rate and regular rhythm.       Heart sounds: Normal heart sounds. No murmur heard. No friction rub. No gallop. Pulmonary:      Effort: Pulmonary effort is normal. No respiratory distress. Breath sounds: Normal breath sounds. No wheezing, rhonchi or rales. Abdominal:      General: Bowel sounds are normal. There is no distension. Palpations: Abdomen is soft. Tenderness: There is no abdominal tenderness. There is no guarding. Musculoskeletal:         General: No swelling or tenderness. Cervical back: Neck supple. No tenderness. Right lower leg: Edema (Trace) present. Left lower leg: Edema (Trace) present. Skin:     General: Skin is warm and dry. Coloration: Skin is not jaundiced. Findings: No bruising, erythema or rash. Neurological:      General: No focal deficit present. Mental Status: He is alert and oriented to person, place, and time. Cranial Nerves: No cranial nerve deficit.       Gait: Gait normal.   Psychiatric:         Mood and Affect: Mood normal.        PROVISIONAL DIAGNOSES / SURGERY:      DYSPHAGIA HISTORY OF ADENOMATOUS POLYP     EGD ESOPHAGOGASTRODUODENOSCOPY / COLONOSCOPY DIAGNOSTIC    Patient Active Problem List    Diagnosis Date Noted    Hx of gout 01/29/2019    Elevated ferritin 01/29/2019    Thrombocytopenia ([facility] [address] 01/29/2019    Arthritis, septic, knee ([address] 07/27/2017    Gout 02/06/2014    CKD (chronic kidney disease) stage 3, GFR 30-59 ml/min     Acute renal failure ([address]          Total time spent on encounter- PAT provider minutes: 21-30 minutes      MARIA DEL ROSARIO Hernández CNP on 1/28/2022 at 10:08 AM

## 2022-01-29 LAB
EKG ATRIAL RATE: 78 BPM
EKG P AXIS: 54 DEGREES
EKG P-R INTERVAL: 184 MS
EKG Q-T INTERVAL: 376 MS
EKG QRS DURATION: 98 MS
EKG QTC CALCULATION (BAZETT): 428 MS
EKG R AXIS: 106 DEGREES
EKG T AXIS: 22 DEGREES
EKG VENTRICULAR RATE: 78 BPM

## 2022-01-29 PROCEDURE — 93010 ELECTROCARDIOGRAM REPORT: CPT | Performed by: INTERNAL MEDICINE

## 2022-02-10 ENCOUNTER — ANESTHESIA EVENT (OUTPATIENT)
Dept: ENDOSCOPY | Age: 77
End: 2022-02-10
Payer: MEDICARE

## 2022-02-11 ENCOUNTER — HOSPITAL ENCOUNTER (OUTPATIENT)
Age: 77
Setting detail: OUTPATIENT SURGERY
Discharge: HOME | End: 2022-02-11
Attending: INTERNAL MEDICINE | Admitting: INTERNAL MEDICINE
Payer: MEDICARE

## 2022-02-11 ENCOUNTER — ANESTHESIA (OUTPATIENT)
Dept: ENDOSCOPY | Age: 77
End: 2022-02-11
Payer: MEDICARE

## 2022-02-11 VITALS
BODY MASS INDEX: 26.6 KG/M2 | HEIGHT: 71 IN | OXYGEN SATURATION: 86 % | DIASTOLIC BLOOD PRESSURE: 57 MMHG | RESPIRATION RATE: 16 BRPM | SYSTOLIC BLOOD PRESSURE: 94 MMHG | TEMPERATURE: 97.2 F | WEIGHT: 190 LBS | HEART RATE: 69 BPM

## 2022-02-11 VITALS — OXYGEN SATURATION: 100 % | SYSTOLIC BLOOD PRESSURE: 77 MMHG | DIASTOLIC BLOOD PRESSURE: 50 MMHG

## 2022-02-11 DIAGNOSIS — R13.19 ESOPHAGEAL DYSPHAGIA: Primary | ICD-10-CM

## 2022-02-11 PROCEDURE — 3609017700 HC EGD DILATION GASTRIC/DUODENAL STRICTURE: Performed by: INTERNAL MEDICINE

## 2022-02-11 PROCEDURE — 43239 EGD BIOPSY SINGLE/MULTIPLE: CPT | Performed by: INTERNAL MEDICINE

## 2022-02-11 PROCEDURE — 7100000010 HC PHASE II RECOVERY - FIRST 15 MIN: Performed by: INTERNAL MEDICINE

## 2022-02-11 PROCEDURE — 88305 TISSUE EXAM BY PATHOLOGIST: CPT

## 2022-02-11 PROCEDURE — 7100000011 HC PHASE II RECOVERY - ADDTL 15 MIN: Performed by: INTERNAL MEDICINE

## 2022-02-11 PROCEDURE — 2500000003 HC RX 250 WO HCPCS: Performed by: NURSE ANESTHETIST, CERTIFIED REGISTERED

## 2022-02-11 PROCEDURE — 3700000001 HC ADD 15 MINUTES (ANESTHESIA): Performed by: INTERNAL MEDICINE

## 2022-02-11 PROCEDURE — 3609010300 HC COLONOSCOPY W/BIOPSY SINGLE/MULTIPLE: Performed by: INTERNAL MEDICINE

## 2022-02-11 PROCEDURE — 3700000000 HC ANESTHESIA ATTENDED CARE: Performed by: INTERNAL MEDICINE

## 2022-02-11 PROCEDURE — 45380 COLONOSCOPY AND BIOPSY: CPT | Performed by: INTERNAL MEDICINE

## 2022-02-11 PROCEDURE — 43450 DILATE ESOPHAGUS 1/MULT PASS: CPT | Performed by: INTERNAL MEDICINE

## 2022-02-11 PROCEDURE — 2709999900 HC NON-CHARGEABLE SUPPLY: Performed by: INTERNAL MEDICINE

## 2022-02-11 PROCEDURE — 2580000003 HC RX 258: Performed by: ANESTHESIOLOGY

## 2022-02-11 PROCEDURE — 6360000002 HC RX W HCPCS: Performed by: NURSE ANESTHETIST, CERTIFIED REGISTERED

## 2022-02-11 RX ORDER — SODIUM CHLORIDE 9 MG/ML
25 INJECTION, SOLUTION INTRAVENOUS PRN
Status: DISCONTINUED | OUTPATIENT
Start: 2022-02-11 | End: 2022-02-11 | Stop reason: HOSPADM

## 2022-02-11 RX ORDER — SODIUM CHLORIDE 0.9 % (FLUSH) 0.9 %
10 SYRINGE (ML) INJECTION PRN
Status: DISCONTINUED | OUTPATIENT
Start: 2022-02-11 | End: 2022-02-11 | Stop reason: HOSPADM

## 2022-02-11 RX ORDER — PROPOFOL 10 MG/ML
INJECTION, EMULSION INTRAVENOUS CONTINUOUS PRN
Status: DISCONTINUED | OUTPATIENT
Start: 2022-02-11 | End: 2022-02-11 | Stop reason: SDUPTHER

## 2022-02-11 RX ORDER — LIDOCAINE HYDROCHLORIDE 10 MG/ML
1 INJECTION, SOLUTION EPIDURAL; INFILTRATION; INTRACAUDAL; PERINEURAL
Status: DISCONTINUED | OUTPATIENT
Start: 2022-02-11 | End: 2022-02-11 | Stop reason: HOSPADM

## 2022-02-11 RX ORDER — SODIUM CHLORIDE, SODIUM LACTATE, POTASSIUM CHLORIDE, CALCIUM CHLORIDE 600; 310; 30; 20 MG/100ML; MG/100ML; MG/100ML; MG/100ML
INJECTION, SOLUTION INTRAVENOUS CONTINUOUS
Status: DISCONTINUED | OUTPATIENT
Start: 2022-02-11 | End: 2022-02-11

## 2022-02-11 RX ORDER — LIDOCAINE HYDROCHLORIDE 10 MG/ML
INJECTION, SOLUTION EPIDURAL; INFILTRATION; INTRACAUDAL; PERINEURAL PRN
Status: DISCONTINUED | OUTPATIENT
Start: 2022-02-11 | End: 2022-02-11 | Stop reason: SDUPTHER

## 2022-02-11 RX ORDER — SODIUM CHLORIDE 9 MG/ML
INJECTION, SOLUTION INTRAVENOUS CONTINUOUS
Status: DISCONTINUED | OUTPATIENT
Start: 2022-02-11 | End: 2022-02-11 | Stop reason: HOSPADM

## 2022-02-11 RX ORDER — PROPOFOL 10 MG/ML
INJECTION, EMULSION INTRAVENOUS PRN
Status: DISCONTINUED | OUTPATIENT
Start: 2022-02-11 | End: 2022-02-11 | Stop reason: SDUPTHER

## 2022-02-11 RX ADMIN — PROPOFOL 125 MCG/KG/MIN: 10 INJECTION, EMULSION INTRAVENOUS at 08:46

## 2022-02-11 RX ADMIN — SODIUM CHLORIDE: 9 INJECTION, SOLUTION INTRAVENOUS at 07:55

## 2022-02-11 RX ADMIN — LIDOCAINE HYDROCHLORIDE 20 MG: 10 INJECTION, SOLUTION EPIDURAL; INFILTRATION; INTRACAUDAL; PERINEURAL at 08:46

## 2022-02-11 RX ADMIN — PROPOFOL 50 MG: 10 INJECTION, EMULSION INTRAVENOUS at 08:46

## 2022-02-11 ASSESSMENT — PULMONARY FUNCTION TESTS
PIF_VALUE: 0
PIF_VALUE: 0
PIF_VALUE: 1
PIF_VALUE: 0

## 2022-02-11 ASSESSMENT — PAIN SCALES - GENERAL
PAINLEVEL_OUTOF10: 0

## 2022-02-11 ASSESSMENT — PAIN - FUNCTIONAL ASSESSMENT: PAIN_FUNCTIONAL_ASSESSMENT: 0-10

## 2022-02-11 NOTE — ANESTHESIA PRE PROCEDURE
Department of Anesthesiology  Preprocedure Note       Name:  Rajni Shukla   Age:  64 y.o.  :  1945                                          MRN:  [id number]         Date:  2022      Surgeon: Neno Pi):  Tori Lopes MD    Procedure: EGD ESOPHAGOGASTRODUODENOSCOPY (N/A Esophagus)  COLONOSCOPY DIAGNOSTIC (N/A )    Medications prior to admission:   Prior to Admission medications    Medication Sig Start Date End Date Taking? Authorizing Provider   polyethylene glycol (GLYCOLAX) 17 GM/SCOOP powder Follow instructions provided to you from physician's office. 22   Tori Lopes MD   bisacodyl (BISACODYL) 5 MG EC tablet Follow instructions provided given by the physician's office. 22   Toir Lopes MD   tamsulosin (FLOMAX) 0.4 MG capsule Take 1 capsule by mouth daily Take one capsule daily to facilitate passage of ureteral stone 21   Elvira Haskins MD   amLODIPine (NORVASC) 5 MG tablet Take 5 mg by mouth daily  19   Historical Provider, MD   metoprolol succinate (TOPROL XL) 50 MG extended release tablet Take 50 mg by mouth daily  19   Historical Provider, MD       Current medications:    No current facility-administered medications for this visit. No current outpatient medications on file. Facility-Administered Medications Ordered in Other Visits   Medication Dose Route Frequency Provider Last Rate Last Admin    sodium chloride flush 0.9 % injection 10 mL  10 mL IntraVENous PRN Grace Alfredo MD        0.9 % sodium chloride infusion  25 mL IntraVENous PRN Grace Alfredo MD        lidocaine PF 1 % injection 1 mL  1 mL IntraDERmal Once PRN Grace Alfredo MD        0.9 % sodium chloride infusion   IntraVENous Continuous George MD Tomi 100 mL/hr at 22 0755 New Bag at 22 0755       Allergies:     Allergies   Allergen Reactions    Latex     Heparin     Lovenox [Enoxaparin Sodium]        Problem List:    Patient Active Problem List   Diagnosis Code    CKD (chronic kidney disease) stage 3, GFR 30-59 ml/min N18.30    Acute renal failure (HCC) N17.9    Gout M10.9    Arthritis, septic, knee (HCC) M00.9    Hx of gout Z87.39    Elevated ferritin R79.89    Thrombocytopenia (HCC) D69.6       Past Medical History:        Diagnosis Date    Acute renal failure ([address]     Anemia     [facility]    [facility]    CKD (chronic kidney disease) stage 3, GFR 30-59 ml/min (HCC)     Depression     Gout     HTN (hypertension)     [facility]icosis ([facility] [address]     Skin cancer     Thrombocytopenia ([facility] [address]     Unspecified hypertensive kidney disease with chronic kidney disease stage I through stage IV, or unspecified(403.90) 4/15/2015       Past Surgical History:        Procedure Laterality Date    CATARACT REMOVAL WITH IMPLANT Right 02/04/2020    [facility]    COLONOSCOPY  1/13/2012    diverticulosisbenign large intestinal mucosa with focal mild increase in lymphoplasmacytic content of lamina propria    COLONOSCOPY  3/30/2011    diverticulosis, tubular adenoma right colon, sigmoid colon-villotubular adenoma    FINGER SURGERY Right     middle finger    HERNIA REPAIR Right     INTRACAPSULAR CATARACT EXTRACTION Right 2/4/2020    EYE CATARACT EMULSIFICATION IOL IMPLANT performed by Mirian Sousa MD at [address] [place] [address] Left 3/3/2020    EYE CATARACT EMULSIFICATION IOL IMPLANT  ENHANCED TORIC  LENS performed by Mirian Sousa MD at [address] [place] [address] Right        Social History:    Social History     Tobacco Use    Smoking status: Former Smoker    Smokeless tobacco: Never Used   Substance Use Topics    Alcohol use: Yes     Comment: sometimes                                 Counseling given: Not Answered      Vital Signs (Current): There were no vitals filed for this visit.                                            BP Readings from Last 3 Encounters: 02/11/22 (!) 148/71   01/28/22 137/73   01/26/22 130/62       NPO Status:                                                                                 BMI:   Wt Readings from Last 3 Encounters:   02/11/22 190 lb (86.2 kg)   01/28/22 192 lb (87.1 kg)   01/26/22 192 lb (87.1 kg)     There is no height or weight on file to calculate BMI.    CBC:   Lab Results   Component Value Date    WBC 3.6 01/28/2022    RBC 4.15 01/28/2022    RBC 4.09 01/24/2012    HGB 12.7 01/28/2022    HCT 37.9 01/28/2022    MCV 91.4 01/28/2022    RDW 14.2 01/28/2022     01/28/2022     01/24/2012       CMP:   Lab Results   Component Value Date     01/28/2022    K 4.5 01/28/2022     01/28/2022    CO2 23 01/28/2022    BUN 31 01/28/2022    CREATININE 1.64 01/28/2022    GFRAA 49 01/28/2022    LABGLOM 41 01/28/2022    GLUCOSE 115 01/28/2022    GLUCOSE 119 01/24/2012    PROT 7.0 12/07/2020    CALCIUM 9.1 01/28/2022    BILITOT 0.47 12/07/2020    ALKPHOS 100 12/07/2020    AST 33 12/07/2020    ALT 22 12/07/2020       POC Tests: No results for input(s): POCGLU, POCNA, POCK, POCCL, POCBUN, POCHEMO, POCHCT in the last 72 hours.     Coags: No results found for: PROTIME, INR, APTT    HCG (If Applicable): No results found for: PREGTESTUR, PREGSERUM, HCG, HCGQUANT     ABGs: No results found for: PHART, PO2ART, JWW9UEZ, DJH0CAL, BEART, D4PFMKVE     Type & Screen (If Applicable):  No results found for: [facility]    Anesthesia Evaluation  Patient summary reviewed and Nursing notes reviewed no history of anesthetic complications:   Airway: Mallampati: III  TM distance: >3 FB   Neck ROM: full  Mouth opening: > = 3 FB Dental: normal exam         Pulmonary:normal exam  breath sounds clear to auscultation                            ROS comment: silicosis   Cardiovascular:    (+) hypertension:,         Rhythm: regular  Rate: normal           Beta Blocker:  Dose within 24 Hrs         Neuro/Psych:   (+) psychiatric history:depression/anxiety             GI/Hepatic/Renal:   (+) renal disease: CRI, bowel prep,           Endo/Other:    (+) blood dyscrasia: thrombocytopenia, arthritis:., .                  ROS comment: Gout Abdominal:             Vascular: negative vascular ROS. Other Findings:             Anesthesia Plan      general     ASA 3       Induction: intravenous. MIPS: Prophylactic antiemetics administered. Anesthetic plan and risks discussed with patient. Plan discussed with CRNA.                   Juan Vega MD   2/11/2022

## 2022-02-11 NOTE — H&P
HISTORY and [facility]       NAME:  Jessica Cespedes  MRN: [id number]   YOB: 1945   Date: 2/11/2022   Age: 64 y.o. Gender: male     H&P Update Note. Pt seen and examined in PAT. H&P from 01/28/2022 reviewed and updated. No interval changes. Pt examined by this writer today. I concur with the findings. Here today for diagnostic colonoscopy and EGD. Vitals: Review vitals per RN flowsheet. Completed and followed prescribed prep. NPO p MN. Took amlodipine and metoprolol this am with sip of water. Denies taking any blood thinning medications. Denies chest pain/pressure, palpitations, SOB, nausea, vomiting, recent URI, fever and chills. MARIA DEL ROSARIO Calixto - CNP on 2/11/2022 at 7:11 AM              HISTORY and [facility]47         NAME:  Jessica Cespedes  MRN: [id number]   YOB: 1945   Date: 1/28/2022   Age: 64 y.o. Gender: male         COMPLAINT AND PRESENT HISTORY:      Jessica Cespedes is 64 y.o.  male, here for preanesthesia testing for diagnostic colonoscopy and EGD. Pt has had previous colonoscopy last in 2012. History of adenomatous polyp. Pt has had previous EGD last in 2014. History of dysphagia. Pt reports intermittent dysphagia particularly with dry foods such as breads. He reports if he drinks water it does go down. Denies dysphagia with pills or liquids. Pt has intermittent GERD symptoms. He does not take PPI or antacids. Denies abdominal pain, nausea, vomiting, diarrhea, constipation, hematochezia and melena. Denies decreased appetite and unexpected weight loss. Pt unaware of any Family Hx of colon cancer. PMHx includes:     HTN: Takes amlodipine, Toprol XL. Denies chest pain/pressure, palpitations, SOB, dizziness, headaches, lightheadedness, lower extremity swelling and blurred vision.          BP Readings from Last 3 Encounters:   01/28/22 137/73   01/26/22 130/62 12/02/21 127/65         CKD: He does not see a nephrologist.            Lab Results   Component Value Date      01/28/2022     K 4.5 01/28/2022      01/28/2022     CO2 23 01/28/2022     BUN 31 01/28/2022     CREATININE 1.64 01/28/2022     GLUCOSE 115 01/28/2022     GLUCOSE 119 01/24/2012     CALCIUM 9.1 01/28/2022         Thrombocytopenia: Stable. Sees Dr. Mundo Li for hematology. Denies bleeding, easy bruising. Lab Results   Component Value Date     WBC 3.6 01/28/2022     HGB 12.7 (L) 01/28/2022     HCT 37.9 (L) 01/28/2022     MCV 91.4 01/28/2022      (L) 75/38/4864      Silicosis: Sees Dr. Jason Sykes for pulmonology. Denies SOB, cough. Denies personal and family history of complications with anesthesia.       PAST MEDICAL HISTORY      Past Medical History        Past Medical History:   Diagnosis Date    Acute renal failure ([address]      Anemia      [facility]epsy 03/03/2020     [facility]    CKD (chronic kidney disease) stage 3, GFR 30-59 ml/min (HCC)      Depression      Gout      HTN (hypertension)      Silicosis ([address]      Skin cancer      Thrombocytopenia (HCC)      Unspecified hypertensive kidney disease with chronic kidney disease stage I through stage IV, or unspecified(403.90) 4/15/2015            SURGICAL HISTORY        Past Surgical History         Past Surgical History:   Procedure Laterality Date    CATARACT REMOVAL WITH IMPLANT Right 02/04/2020     [facility]    COLONOSCOPY   1/13/2012     diverticulosisbenign large intestinal mucosa with focal mild increase in lymphoplasmacytic content of lamina propria    COLONOSCOPY   3/30/2011     diverticulosis, tubular adenoma right colon, sigmoid colon-villotubular adenoma    FINGER SURGERY Right       middle finger    HERNIA REPAIR Right      INTRACAPSULAR CATARACT EXTRACTION Right 2/4/2020     EYE CATARACT EMULSIFICATION IOL IMPLANT performed by Denton Hernandes MD at [facility] INTRACAPSULAR CATARACT EXTRACTION Left 3/3/2020     EYE CATARACT EMULSIFICATION IOL IMPLANT  ENHANCED TORIC  LENS performed by Saúl Bianchi MD at [address] [place] [address] ROTATOR CUFF REPAIR Right              FAMILY HISTORY        Family History         Family History   Problem Relation Age of Onset    Cancer Sister           female            SOCIAL HISTORY        Social History   Social History            Socioeconomic History    Marital status:        Spouse name: None    Number of children: None    Years of education: None    Highest education level: None   Occupational History    None   Tobacco Use    Smoking status: Former Smoker    Smokeless tobacco: Never Used   Vaping Use    Vaping Use: Never used   Substance and Sexual Activity    Alcohol use: Yes       Comment: sometimes     Drug use: No    Sexual activity: None   Other Topics Concern    None   Social History Narrative    None      Social Determinants of Health          Financial Resource Strain:     Difficulty of Paying Living Expenses: Not on file   Food Insecurity:     Worried About Running Out of Food in the Last Year: Not on file    Damon of Food in the Last Year: Not on file   Transportation Needs:     Lack of Transportation (Medical): Not on file    Lack of Transportation (Non-Medical):  Not on file   Physical Activity:     Days of Exercise per Week: Not on file    Minutes of Exercise per Session: Not on file   Stress:     Feeling of Stress : Not on file   Social Connections:     Frequency of Communication with Friends and Family: Not on file    Frequency of Social Gatherings with Friends and Family: Not on file    Attends Jainism Services: Not on file    Active Member of Clubs or Organizations: Not on file    Attends Club or Organization Meetings: Not on file    Marital Status: Not on file   Intimate Partner Violence:     Fear of Current or Ex-Partner: Not on file    Emotionally Abused: Not on file    Physically Abused: Not on file    Sexually Abused: Not on file   Housing Stability:     Unable to Pay for Housing in the Last Year: Not on file    Number of [place] in the Last Year: Not on file    Unstable Housing in the Last Year: Not on file            REVIEW OF SYSTEMS            Allergies   Allergen Reactions    Latex      Heparin      Lovenox [Enoxaparin Sodium]                  Current Outpatient Medications on File Prior to Encounter   Medication Sig Dispense Refill    tamsulosin (FLOMAX) 0.4 MG capsule Take 1 capsule by mouth daily Take one capsule daily to facilitate passage of ureteral stone 90 capsule 3    amLODIPine (NORVASC) 5 MG tablet Take 5 mg by mouth daily    0    metoprolol succinate (TOPROL XL) 50 MG extended release tablet Take 50 mg by mouth daily    3    polyethylene glycol (GLYCOLAX) 17 GM/SCOOP powder Follow instructions provided to you from physician's office. 238 g 0    bisacodyl (BISACODYL) 5 MG EC tablet Follow instructions provided given by the physician's office. 2 tablet 0      No current facility-administered medications on file prior to encounter. Review of Systems   Constitutional: Negative for appetite change, chills, fever and unexpected weight change. HENT: Negative for congestion and sore throat. Eyes: Negative for visual disturbance. Respiratory: Negative for cough, shortness of breath and wheezing. Cardiovascular: Negative for chest pain, palpitations and leg swelling. Gastrointestinal: Negative for abdominal pain, constipation, diarrhea, nausea and vomiting. See HPI   Genitourinary: Negative. Musculoskeletal: Positive for joint swelling (History of gout). Negative for back pain and neck pain. Skin: Negative for rash and wound. Neurological: Negative for dizziness, speech difficulty, light-headedness and headaches. Hematological: Bruises/bleeds easily (History of thrombocytopenia).    Psychiatric/Behavioral: Negative. GENERAL PHYSICAL EXAM      Vitals: /73   Pulse 85   Temp 97.5 °F (36.4 °C)   Resp 16   Ht 5' 11\" (1.803 m)   Wt 192 lb (87.1 kg)   SpO2 100%   BMI 26.78 kg/m²  Body mass index is 26.78 kg/m². Physical Exam  Constitutional:       General: He is not in acute distress. Appearance: He is well-developed. He is not ill-appearing, toxic-appearing or diaphoretic. HENT:      Head: Normocephalic and atraumatic. Nose: Nose normal. No congestion. Mouth/Throat:      Mouth: Mucous membranes are moist.      Pharynx: Oropharynx is clear. No oropharyngeal exudate or posterior oropharyngeal erythema. Eyes:      General: No scleral icterus. Right eye: No discharge. Left eye: No discharge. Conjunctiva/sclera: Conjunctivae normal.      Pupils: Pupils are equal, round, and reactive to light. Neck:      Trachea: No tracheal deviation. Cardiovascular:      Rate and Rhythm: Normal rate and regular rhythm. Heart sounds: Normal heart sounds. No murmur heard. No friction rub. No gallop. Pulmonary:      Effort: Pulmonary effort is normal. No respiratory distress. Breath sounds: Normal breath sounds. No wheezing, rhonchi or rales. Abdominal:      General: Bowel sounds are normal. There is no distension. Palpations: Abdomen is soft. Tenderness: There is no abdominal tenderness. There is no guarding. Musculoskeletal:         General: No swelling or tenderness. Cervical back: Neck supple. No tenderness. Right lower leg: Edema (Trace) present. Left lower leg: Edema (Trace) present. Skin:     General: Skin is warm and dry. Coloration: Skin is not jaundiced. Findings: No bruising, erythema or rash. Neurological:      General: No focal deficit present. Mental Status: He is alert and oriented to person, place, and time. Cranial Nerves: No cranial nerve deficit.       Gait: Gait normal. Psychiatric:         Mood and Affect: Mood normal.         PROVISIONAL DIAGNOSES / SURGERY:       DYSPHAGIA HISTORY OF ADENOMATOUS POLYP      EGD ESOPHAGOGASTRODUODENOSCOPY / COLONOSCOPY DIAGNOSTIC          Patient Active Problem List     Diagnosis Date Noted    Hx of gout 01/29/2019    Elevated ferritin 01/29/2019    Thrombocytopenia ([facility] 01/29/2019    Arthritis, septic, knee ([facility] 07/27/2017    Gout 02/06/2014    CKD (chronic kidney disease) stage 3, GFR 30-59 ml/min      Acute renal failure ([facility]           Total time spent on encounter- PAT provider minutes: 21-30 minutes        MARIA DEL ROSARIO Gilbert - CNP on 1/28/2022 at 10:08 AM

## 2022-02-11 NOTE — OP NOTE
COLONOSCOPY    DATE OF PROCEDURE: 2/11/2022    SURGEON: Cherie Nunez MD    ASSISTANT: None    PREOPERATIVE DIAGNOSIS: This patient had a huge polypoid lesion removed more than 10 years ago. Procedure performed to evaluate colonic lesions/screening    POSTOPERATIVE DIAGNOSIS: Diverticulosis in the sigmoid colon. Small polyp sigmoid colon excised with biopsy forceps    OPERATION: Total colonoscopy, excision of polyp with biopsy forceps    ANESTHESIA: MAC    ESTIMATED BLOOD LOSS: None    COMPLICATIONS: None     SPECIMENS:  Was Obtained: Small polyp sigmoid colon excised with biopsy forceps    HISTORY: The patient is a 64y.o. year old male with history of above preop diagnosis. I recommended colonoscopy with possible biopsy or polypectomy and I explained the risk, benefits, expected outcome, and alternatives to the procedure. Risks included but are not limited to bleeding, infection, respiratory distress, hypotension, and perforation of the colon and possibility of missing a lesion. The patient understands and is in agreement. PROCEDURE:  The patient's SPO2 remained above 90% throughout the procedure. Digital rectal exam was normal.  The colonoscope was inserted through the anus into the rectum and advanced under direct vision to the cecum without difficulty. Terminal ileum was examined for approximately 2 inches. The prep was good. Findings:  Terminal ileum: normal    Cecum/Ascending colon: normal, also examined in the retroflexed view    Transverse colon: normal    Descending/Sigmoid colon: abnormal: Has diverticulosis. At about 50 cm from the anus there is a small polyp 3 to 5 mm seen, excised with biopsy forceps    Rectum/Anus: examined in normal and retroflexed positions and was normal    Withdrawal Time was (minutes): 12          The colon was decompressed and the scope was removed. The patient tolerated the procedure without unusual events.      During the procedure, the patient's blood pressure, pulse and oxygen saturation remained stable and documented. No unusual events occurred during the procedure. Patient was transferred to recovery room and will be discharged when criteria is met. Recommendations/Plan:   1. F/U Biopsies  2. F/U In Office as instructed  3. Discussed with the family  4. High fiber diet   5. Precautions to avoid constipation     Next colonoscopy: 5 years.   If Colonoscopy is less than 10 years the recommended reason is due:polyps    Electronically signed by John Ferrera MD  on 2/11/2022 at 9:27 AM

## 2022-02-11 NOTE — ANESTHESIA POSTPROCEDURE EVALUATION
Department of Anesthesiology  Postprocedure Note    Patient: Mariely Caro  MRN: [id number]  YOB: 1945  Date of evaluation: 2/11/2022  Time:  10:50 AM     Procedure Summary     Date: 02/11/22 Room / Location: [address] [place] [address] 01 / [facility] ENDO    Anesthesia Start: 0842 Anesthesia Stop: 0933    Procedures:       EGD BIOPSY WITH DILATION (N/A Esophagus)      COLONOSCOPY WITH BIOPSY (N/A ) Diagnosis: (DYSPHAGIA HISTORY OF ADENOMATOUS POLYP (PT HAS HAD COVID VACCINE))    Surgeons: Alden Paez MD Responsible Provider: Shaye Escamilla MD    Anesthesia Type: general ASA Status: 3          Anesthesia Type: general    Des Phase I:      Des Phase II: Des Score: 7    Last vitals: Reviewed and per EMR flowsheets.        Anesthesia Post Evaluation    Comments: POST- ANESTHESIA EVALUATION       Pt Name: Mariely Caro  MRN: [id number]  YOB: 1945  Date of evaluation: 2/11/2022  Time:  10:50 AM      BP (!) 94/57   Pulse 69   Temp 97.2 °F (36.2 °C)   Resp 16   Ht 5' 11\" (1.803 m)   Wt 190 lb (86.2 kg)   SpO2 (!) 86%   BMI 26.50 kg/m²      Consciousness Level  Awake  Cardiopulmonary Status  Stable  Pain Adequately Treated YES  Nausea / Vomiting  NO  Adequate Hydration  YES  Anesthesia Related Complications NONE      Electronically signed by Shaye Escamilla MD on 2/11/2022 at 10:50 AM

## 2022-02-11 NOTE — OP NOTE
ESOPHAGOGASTRODUODENOSCOPY   ( EGD )  DATE OF PROCEDURE: 2/11/2022     SURGEON: Lenard Cabrera MD    ASSISTANT: None    PREOPERATIVE DIAGNOSIS: Dysphagia. Procedure performed to evaluate upper GI bleed. POSTOPERATIVE DIAGNOSIS: Questionable prominent cricopharyngeal muscle/cartilage. Small polyp second part of the duodenum. Small sliding hiatal hernia with mucosal prominence at the GE junction  OPERATION: Upper GI endoscopy with Biopsy,, Brown dilation of esophagus    ANESTHESIA: MAC    ESTIMATED BLOOD LOSS: None    COMPLICATIONS: None. SPECIMENS:  Was Obtained: Excision of polyp from the duodenum, biopsy from the GE junction to rule out dysplasia    HISTORY: The patient is a 64y.o. year old male with history of above preop diagnosis. I recommended esophagogastroduodenoscopy with possible biopsy and I explained the risk, benefits, expected outcome, and alternatives to the procedure. Risks included but are not limited to bleeding, infection, respiratory distress, hypotension, and perforation of the esophagus, stomach, or duodenum. Patient understands and is in agreement. PROCEDURE: The patient was given IV conscious sedation. The patient's SPO2 remained above 90% throughout the procedure. Cetacaine spray given. Patient placed in left lateral position. Olympus  videogastroscope was inserted orally under vision into the esophagus without difficulty and advanced into the stomach then through the pylorus up to the second part of duodenum. Findings:    Retropharyngeal area was grossly normal appearing    Esophagus: normal.  May have motility problems. There appears to be prominent cricopharyngeal muscle/cartilage. However, no luminal narrowing noted. May have a small sliding hiatal hernia.       Stomach:    Fundus and Cardia Examined in Retroflexed View: normal    Body: normal    Antrum: normal    Duodenum:     Descending: abnormal: In the second part of the duodenum on a fold there is a polyp which is about 3 to 4 mm seen, very thin. This is completely excised using cold biopsy forceps. Bulb: normal    As patient is having dysphagia, I did dilate the esophagus using 2 4 [place] Brown dilator. The dilation was carried out in the usual fashion, following the dilatation I did inspect the esophagus no mucosal stretch marks seen. No resistance to pass the dilator. At the GE junction there appears to be prominent mucosa/mucosal hypertrophy which is about 3 to 5 mm from each multiple biopsies taken. May have a small sliding hiatal hernia. While withdrawing the scope the above findings were verified and the scope was removed. The patient has tolerated the procedure without unusual events. Recommendations/Plan:   1. F/U Biopsies  2. F/U In Office as instructed  3.  Discussed with the family                   Electronically signed by Venancio Blake MD  on 2/11/2022 at 9:08 AM

## 2022-02-14 LAB — SURGICAL PATHOLOGY REPORT: NORMAL

## 2022-03-24 ENCOUNTER — OFFICE VISIT (OUTPATIENT)
Dept: GASTROENTEROLOGY | Age: 77
End: 2022-03-24
Payer: MEDICARE

## 2022-03-24 VITALS
HEART RATE: 82 BPM | OXYGEN SATURATION: 98 % | SYSTOLIC BLOOD PRESSURE: 116 MMHG | BODY MASS INDEX: 26.71 KG/M2 | TEMPERATURE: 96.9 F | HEIGHT: 71 IN | DIASTOLIC BLOOD PRESSURE: 66 MMHG | WEIGHT: 190.8 LBS

## 2022-03-24 DIAGNOSIS — K57.90 DIVERTICULOSIS: Primary | ICD-10-CM

## 2022-03-24 DIAGNOSIS — K31.89: ICD-10-CM

## 2022-03-24 DIAGNOSIS — K44.9 HIATAL HERNIA: ICD-10-CM

## 2022-03-24 DIAGNOSIS — D12.6 TUBULAR ADENOMA OF COLON: ICD-10-CM

## 2022-03-24 PROCEDURE — 1123F ACP DISCUSS/DSCN MKR DOCD: CPT | Performed by: NURSE PRACTITIONER

## 2022-03-24 PROCEDURE — 4040F PNEUMOC VAC/ADMIN/RCVD: CPT | Performed by: NURSE PRACTITIONER

## 2022-03-24 PROCEDURE — G8484 FLU IMMUNIZE NO ADMIN: HCPCS | Performed by: NURSE PRACTITIONER

## 2022-03-24 PROCEDURE — G8417 CALC BMI ABV UP PARAM F/U: HCPCS | Performed by: NURSE PRACTITIONER

## 2022-03-24 PROCEDURE — G8427 DOCREV CUR MEDS BY ELIG CLIN: HCPCS | Performed by: NURSE PRACTITIONER

## 2022-03-24 PROCEDURE — 99214 OFFICE O/P EST MOD 30 MIN: CPT | Performed by: NURSE PRACTITIONER

## 2022-03-24 PROCEDURE — 1036F TOBACCO NON-USER: CPT | Performed by: NURSE PRACTITIONER

## 2022-03-24 ASSESSMENT — ENCOUNTER SYMPTOMS
SINUS PAIN: 1
ABDOMINAL DISTENTION: 0
SINUS PRESSURE: 1
TROUBLE SWALLOWING: 0
VOICE CHANGE: 0
COUGH: 0
CONSTIPATION: 0
RECTAL PAIN: 0
NAUSEA: 0
SORE THROAT: 1
BACK PAIN: 0
CHOKING: 0
ABDOMINAL PAIN: 0
DIARRHEA: 0
SHORTNESS OF BREATH: 0
ANAL BLEEDING: 0
BLOOD IN STOOL: 0
VOMITING: 0

## 2022-03-24 NOTE — PROGRESS NOTES
GI CLINIC FOLLOW UP    INTERVAL HISTORY:   No referring provider defined for this encounter. HISTORY OF PRESENT ILLNESS:     Patient being seen for follow-up EGD, colonoscopy. Patient has been having intermittent dysphagia with solids. EGD revealed questionable prominent cricopharyngeal muscle. Small polyp noted at the second part of the duodenum. Histology revealed benign Brunner's gland hyperplasia. Also noted to have small sliding hiatal hernia with mucosal prominence at the GE junction. Biopsies revealed moderate chronic inflammation. Patient also had colonoscopy for screening. Has history of huge polypoid lesion removed greater than 10 years ago. Noted to have diverticulosis in the sigmoid colon. Also noted to have 3 to 5 mm polyp in the sigmoid colon excised with biopsy forceps. Histology revealed tubular adenoma. At present patient is doing well. He continues to have some intermittent dysphagia with solids. Especially breads, meats. No symptoms of complete obstruction of the esophagus. Usually able to get to pass with small amounts of liquids. No regurgitation of food. Does have some mild GERD symptoms. No symptoms suggestive of extra esophageal manifestations of GERD. Patient has good appetite. He denies any weight loss. Bowel movements are satisfactory. Denies any significant constipation or diarrhea. No melena, hematochezia. Denies any abdominal pain, cramping, bloating. Past Medical,Family, and Social History reviewed and does contribute to the patient presentingcondition. Patient's PMH/PSH,SH,PSYCH Hx, MEDs, ALLERGIES, and ROS were all reviewed and updated in the appropriate sections.     PAST MEDICAL HISTORY:  Past Medical History:   Diagnosis Date    Acute renal failure ([address]     Anemia     Catalepsy 03/03/2020    [facility]    CKD (chronic kidney disease) stage 3, GFR 30-59 ml/min (HCC)     Depression     Gout     HTN (hypertension)  [facility]osis [facility]     Skin cancer     Thrombocytopenia ([facility]     Unspecified hypertensive kidney disease with chronic kidney disease stage I through stage IV, or unspecified(403.90) 4/15/2015       Past Surgical History:   Procedure Laterality Date    CATARACT REMOVAL WITH IMPLANT Right 02/04/2020    [facility]    COLONOSCOPY  1/13/2012    diverticulosisbenign large intestinal mucosa with focal mild increase in lymphoplasmacytic content of lamina propria    COLONOSCOPY  3/30/2011    diverticulosis, tubular adenoma right colon, sigmoid colon-villotubular adenoma    COLONOSCOPY N/A 2/11/2022    COLONOSCOPY WITH BIOPSY performed by Lenard Cabrera MD at [address] [facility] Right     middle finger    HERNIA REPAIR Right     INTRACAPSULAR CATARACT EXTRACTION Right 2/4/2020    EYE CATARACT EMULSIFICATION IOL IMPLANT performed by Pastor Shari MD at [address] [place] [address] Left 3/3/2020    EYE CATARACT EMULSIFICATION IOL IMPLANT  ENHANCED TORIC  LENS performed by Pastor Shari MD at [address] [facility] Right     UPPER GASTROINTESTINAL ENDOSCOPY N/A 2/11/2022    EGD BIOPSY WITH DILATION performed by Lenard Cabrera MD at [address]:    Current Outpatient Medications:     tamsulosin (FLOMAX) 0.4 MG capsule, Take 1 capsule by mouth daily Take one capsule daily to facilitate passage of ureteral stone, Disp: 90 capsule, Rfl: 3    amLODIPine (NORVASC) 5 MG tablet, Take 5 mg by mouth daily , Disp: , Rfl: 0    metoprolol succinate (TOPROL XL) 50 MG extended release tablet, Take 50 mg by mouth daily , Disp: , Rfl: 3    ALLERGIES:   Allergies   Allergen Reactions    Latex     Heparin     Lovenox [Enoxaparin Sodium]        FAMILY HISTORY:       Problem Relation Age of Onset    Cancer Sister         female         SOCIAL HISTORY:   Social History     Socioeconomic History    Marital status:  Spouse name: Not on file    Number of children: Not on file    Years of education: Not on file    Highest education level: Not on file   Occupational History    Not on file   Tobacco Use    Smoking status: Former Smoker     Packs/day: 1.00     Years: 15.00     Pack years: 15.00     Types: Cigarettes     Quit date: 3/24/1992     Years since quittin.0    Smokeless tobacco: Never Used   Vaping Use    Vaping Use: Never used   Substance and Sexual Activity    Alcohol use: Yes     Comment: sometimes     Drug use: No    Sexual activity: Not on file   Other Topics Concern    Not on file   Social History Narrative    Not on file     Social Determinants of Health     Financial Resource Strain:     Difficulty of Paying Living Expenses: Not on file   Food Insecurity:     Worried About [address] [employer] [employer] in the Last Year: Not on file    Damon of Food in the Last Year: Not on file   Transportation Needs:     Lack of Transportation (Medical): Not on file    Lack of Transportation (Non-Medical):  Not on file   Physical Activity:     Days of Exercise per Week: Not on file    Minutes of Exercise per Session: Not on file   Stress:     Feeling of Stress : Not on file   Social Connections:     Frequency of Communication with Friends and Family: Not on file    Frequency of Social Gatherings with Friends and Family: Not on file    Attends Restoration Services: Not on file    Active Member of [address] [place] [address] or Organizations: Not on file    Attends Club or Organization Meetings: Not on file    Marital Status: Not on file   Intimate Partner Violence:     Fear of Current or Ex-Partner: Not on file    Emotionally Abused: Not on file    Physically Abused: Not on file    Sexually Abused: Not on file   Housing Stability:     Unable to Pay for Housing in the Last Year: Not on file    Number of [place] in the Last Year: Not on file    Unstable Housing in the Last Year: Not on file       REVIEW OF SYSTEMS: A 12-point review of systemswas obtained and pertinent positives and negatives were enumerated above in the history of present illness. All other reviewed systems / symptoms were negative. Review of Systems   Constitutional: Negative for appetite change, fatigue and unexpected weight change. HENT: Positive for ear pain (plugged), sinus pressure, sinus pain and sore throat. Negative for dental problem, trouble swallowing and voice change. Eyes: Negative for visual disturbance. Respiratory: Negative for cough, choking and shortness of breath. Cardiovascular: Negative for chest pain and leg swelling. Gastrointestinal: Negative for abdominal distention, abdominal pain, anal bleeding, blood in stool, constipation, diarrhea, nausea, rectal pain and vomiting. Genitourinary: Negative for difficulty urinating. Musculoskeletal: Negative for back pain, joint swelling and myalgias. Neurological: Negative for dizziness, tremors, weakness, light-headedness, numbness and headaches. Hematological: Does not bruise/bleed easily. Psychiatric/Behavioral: Negative for sleep disturbance. The patient is not nervous/anxious. PHYSICAL EXAMINATION: Vital signs reviewed per the nursing documentation. /66   Pulse 82   Temp 96.9 °F (36.1 °C)   Ht 5' 11\" (1.803 m)   Wt 190 lb 12.8 oz (86.5 kg)   SpO2 98%   BMI 26.61 kg/m²   Body mass index is 26.61 kg/m². Physical Exam  Constitutional:       Appearance: Normal appearance. Eyes:      General: No scleral icterus. Pupils: Pupils are equal, round, and reactive to light. Cardiovascular:      Rate and Rhythm: Normal rate and regular rhythm. Heart sounds: Normal heart sounds. Pulmonary:      Effort: Pulmonary effort is normal.      Breath sounds: Normal breath sounds. Abdominal:      General: Bowel sounds are normal. There is no distension. Palpations: Abdomen is soft. There is no mass. Tenderness:  There is no abdominal tenderness. There is no guarding. Skin:     General: Skin is warm and dry. Coloration: Skin is not jaundiced. Neurological:      Mental Status: He is alert and oriented to person, place, and time. Mental status is at baseline. LABORATORY DATA: Reviewed  Lab Results   Component Value Date    WBC 3.6 01/28/2022    HGB 12.7 (L) 01/28/2022    HCT 37.9 (L) 01/28/2022    MCV 91.4 01/28/2022     (L) 01/28/2022     01/28/2022    K 4.5 01/28/2022     (H) 01/28/2022    CO2 23 01/28/2022    BUN 31 (H) 01/28/2022    CREATININE 1.64 (H) 01/28/2022    LABALBU 4.1 12/07/2020    BILITOT 0.47 12/07/2020    ALKPHOS 100 12/07/2020    AST 33 12/07/2020    ALT 22 12/07/2020         Lab Results   Component Value Date    RBC 4.15 (L) 01/28/2022    HGB 12.7 (L) 01/28/2022    MCV 91.4 01/28/2022    MCH 30.7 01/28/2022    MCHC 33.5 01/28/2022    RDW 14.2 01/28/2022    MPV 8.0 01/28/2022    BASOPCT 1 01/28/2022    LYMPHSABS 0.80 (L) 01/28/2022    MONOSABS 0.30 01/28/2022    NEUTROABS 2.40 01/28/2022    EOSABS 0.10 01/28/2022    BASOSABS 0.00 01/28/2022         DIAGNOSTIC TESTING:     COLONOSCOPY     DATE OF PROCEDURE: 2/11/2022     SURGEON: Caitlin Diaz MD     ASSISTANT: None     PREOPERATIVE DIAGNOSIS: This patient had a huge polypoid lesion removed more than 10 years ago. Procedure performed to evaluate colonic lesions/screening     POSTOPERATIVE DIAGNOSIS: Diverticulosis in the sigmoid colon. Small polyp sigmoid colon excised with biopsy forceps     OPERATION: Total colonoscopy, excision of polyp with biopsy forceps     ANESTHESIA: MAC     ESTIMATED BLOOD LOSS: None     COMPLICATIONS: None      SPECIMENS:  Was Obtained: Small polyp sigmoid colon excised with biopsy forceps     HISTORY: The patient is a 64y.o. year old male with history of above preop diagnosis.   I recommended colonoscopy with possible biopsy or polypectomy and I explained the risk, benefits, expected outcome, and DIAGNOSIS: Questionable prominent cricopharyngeal muscle/cartilage. Small polyp second part of the duodenum. Small sliding hiatal hernia with mucosal prominence at the GE junction  OPERATION: Upper GI endoscopy with Biopsy,, Stephanie dilation of esophagus     ANESTHESIA: MAC     ESTIMATED BLOOD LOSS: None     COMPLICATIONS: None.      SPECIMENS:  Was Obtained: Excision of polyp from the duodenum, biopsy from the GE junction to rule out dysplasia     HISTORY: The patient is a 64y.o. year old male with history of above preop diagnosis. I recommended esophagogastroduodenoscopy with possible biopsy and I explained the risk, benefits, expected outcome, and alternatives to the procedure. Risks included but are not limited to bleeding, infection, respiratory distress, hypotension, and perforation of the esophagus, stomach, or duodenum. Patient understands and is in agreement.        PROCEDURE: The patient was given IV conscious sedation. The patient's SPO2 remained above 90% throughout the procedure. Cetacaine spray given. Patient placed in left lateral position. Olympus  videogastroscope was inserted orally under vision into the esophagus without difficulty and advanced into the stomach then through the pylorus up to the second part of duodenum.       Findings:     Retropharyngeal area was grossly normal appearing     Esophagus: normal.  May have motility problems. There appears to be prominent cricopharyngeal muscle/cartilage. However, no luminal narrowing noted. May have a small sliding hiatal hernia.       Stomach:    Fundus and Cardia Examined in Retroflexed View: normal    Body: normal    Antrum: normal     Duodenum:     Descending: abnormal: In the second part of the duodenum on a fold there is a polyp which is about 3 to 4 mm seen, very thin. This is completely excised using cold biopsy forceps.     Bulb: normal     As patient is having dysphagia, I did dilate the esophagus using 2 4 [employer] Tanna Brown dilator. The dilation was carried out in the usual fashion, following the dilatation I did inspect the esophagus no mucosal stretch marks seen. No resistance to pass the dilator.     At the GE junction there appears to be prominent mucosa/mucosal hypertrophy which is about 3 to 5 mm from each multiple biopsies taken.     May have a small sliding hiatal hernia.              While withdrawing the scope the above findings were verified and the scope was removed. The patient has tolerated the procedure without unusual events.            Recommendations/Plan:   1. F/U Biopsies  2. F/U In Office as instructed  3. Discussed with the family                          Electronically signed by Brock Woods MD  on 2/11/2022 at 9:08 AM      Component 2/11/22 0858   Surgical Pathology Report -- Diagnosis --   A.  DUODENUM, BIOPSY:        -NODULAR BRUNNER'S GLAND HYPERPLASIA, BENIGN. B.  GE JUNCTION, BIOPSY (SQUAMOUS AND GLANDULAR MUCOSA):        -MODERATE CHRONIC INFLAMMATION. C.  POLYP, COLORECTAL (SIGMOID):        -BENIGN ADENOMATOUS POLYP (TUBULAR ADENOMA). Author RONALD Choudhury.   **Electronically Signed Out**         [facility]/2/14/2022         Clinical Information   Pre-Op Diagnosis:  DYSPHAGIA, HISTORY OF ADENOMATOUS POLYP,   DIVERTICULOSIS   Operative Findings:  DUODENUM POLYP; GE JUNCTION BIOPSY TO RULE OUT   DYSPHAGIA; POLYP SIGMOID COLON   Operation Performed:  EGD BIOPSY WITH DILATATION, COLONOSCOPY WITH   BIOPSY     Source of Specimen   A: DUODENAL POLYP   B: GE JUNCTION BIOPSY TO RULE OUT DYSPHAGIA   C: POLYP SIGMOID COLON     Gross Description   A.  \"ATHANASIOS VALORIE, DUODENUM POLYP\" Two tan-white tissue   fragments each 0.4 cm and are 0.7 x 0.4 x 0.2 cm in aggregate. Entirely 1cs. Gallo Rust, GE JUNCTION BIOPSY\" Multiple tan-white   tissue fragments from 0.2 to 0.5 cm and are 1.5 x 0.6 x 0.2 cm in   aggregate.  Entirely 1cs.    Nura Rust, POLYP SIGMOID COLON\" Multiple tan-white   tissue fragments from 0.2 to 0.4 cm and are 1.0 x 0.6 x 0.2 cm in   aggregate.  Entirely 1cs.  lm tm       Microscopic Description   A.  Small intestine mucosa present, negative for villous atrophy,   intraepithelial lymphocytosis, ulcer, fissure, granuloma, and   neoplasm.  Microscopic features of celiac disease are not detected.  A   nodular aggregate of Brunner's glands is present within the lamina   propria consistent with hyperplasia. B.  With examination at two levels of section, the biopsy includes   squamous and glandular (gastric-type) mucosa. No ulcer,   intra-epithelial eosinophils, intestinal metaplasia (Floyd's),   dysplasia, or neoplasm is present. C.  Microscopic examination performed. SURGICAL PATHOLOGY CONSULTATION         Patient Name: Mary Worrell Med Rec: [id number]   Path Number: [id number]     [address], [place] [address]. [place], [address]   [phone]   Fax: [phone]            IMPRESSION: Mr. Mary Bhatia is a 64 y.o. male with    Diagnosis Orders   1. Diverticulosis     2. Tubular adenoma of colon     3. Brunner's gland hyperplasia of duodenum     4. Hiatal hernia       EGD and colonoscopy reviewed with patient in detail. Probable esophageal motility disorder. May also have prominent cricopharyngeal bar. Patient was advised to have esophagram done however this was not completed. Also noted to have small sliding hiatal hernia  Has Brunner's gland hyperplasia of the duodenum  Mild chronic inflammation of the GE junction. Pt was asked to chew food well  Take time in eating  Sit up or prop up when eating  Don't talk when eating  Walk after finish eating  Use liquids with meals if has issues    PPI therapy    Colonoscopy also reviewed. Noted to have small tubular adenoma in the sigmoid colon.   Next colonoscopy 5 years    Follow-up in the next few months. Again patient was advised to have esophagram completed. Thank you for allowing me to participate in the care of Mr. Yadiel Gloria. For any further questions please do not hesitate to contact me. I have reviewed and agree with the ROS entered by the MA/EARL.          HAMLET Herrera    [facility]  Office #: [phone]

## 2022-05-18 ENCOUNTER — HOSPITAL ENCOUNTER (OUTPATIENT)
Age: 77
Discharge: HOME | End: 2022-05-18
Payer: MEDICARE

## 2022-05-18 LAB
ABSOLUTE EOS #: 0.2 K/UL (ref 0–0.4)
ABSOLUTE LYMPH #: 0.7 K/UL (ref 1–4.8)
ABSOLUTE MONO #: 0.3 K/UL (ref 0.1–1.3)
BASOPHILS # BLD: 0 % (ref 0–2)
BASOPHILS ABSOLUTE: 0 K/UL (ref 0–0.2)
EOSINOPHILS RELATIVE PERCENT: 6 % (ref 0–4)
FERRITIN: 813 NG/ML (ref 30–400)
FOLATE: 10.4 NG/ML
HCT VFR BLD CALC: 37.7 % (ref 41–53)
HEMOGLOBIN: 12.5 G/DL (ref 13.5–17.5)
LYMPHOCYTES # BLD: 20 % (ref 24–44)
MCH RBC QN AUTO: 30.8 PG (ref 26–34)
MCHC RBC AUTO-ENTMCNC: 33 G/DL (ref 31–37)
MCV RBC AUTO: 93.1 FL (ref 80–100)
MONOCYTES # BLD: 7 % (ref 1–7)
PDW BLD-RTO: 14.4 % (ref 11.5–14.9)
PLATELET # BLD: 87 K/UL (ref 150–450)
PMV BLD AUTO: 8.6 FL (ref 6–12)
RBC # BLD: 4.05 M/UL (ref 4.5–5.9)
SEG NEUTROPHILS: 67 % (ref 36–66)
SEGMENTED NEUTROPHILS ABSOLUTE COUNT: 2.3 K/UL (ref 1.3–9.1)
VITAMIN B-12: 430 PG/ML (ref 232–1245)
WBC # BLD: 3.5 K/UL (ref 3.5–11)

## 2022-05-18 PROCEDURE — 82607 VITAMIN B-12: CPT

## 2022-05-18 PROCEDURE — 36415 COLL VENOUS BLD VENIPUNCTURE: CPT

## 2022-05-18 PROCEDURE — 82728 ASSAY OF FERRITIN: CPT

## 2022-05-18 PROCEDURE — 82746 ASSAY OF FOLIC ACID SERUM: CPT

## 2022-05-18 PROCEDURE — 85025 COMPLETE CBC W/AUTO DIFF WBC: CPT

## 2022-06-02 ENCOUNTER — OFFICE VISIT (OUTPATIENT)
Dept: ONCOLOGY | Age: 77
End: 2022-06-02
Payer: MEDICARE

## 2022-06-02 ENCOUNTER — TELEPHONE (OUTPATIENT)
Dept: ONCOLOGY | Age: 77
End: 2022-06-02

## 2022-06-02 VITALS
BODY MASS INDEX: 26.6 KG/M2 | WEIGHT: 190.7 LBS | TEMPERATURE: 97.2 F | SYSTOLIC BLOOD PRESSURE: 120 MMHG | DIASTOLIC BLOOD PRESSURE: 65 MMHG | HEART RATE: 74 BPM

## 2022-06-02 DIAGNOSIS — Z87.39 HX OF GOUT: Primary | ICD-10-CM

## 2022-06-02 DIAGNOSIS — D69.6 THROMBOCYTOPENIA (HCC): ICD-10-CM

## 2022-06-02 DIAGNOSIS — R79.89 ELEVATED FERRITIN: ICD-10-CM

## 2022-06-02 PROCEDURE — G8427 DOCREV CUR MEDS BY ELIG CLIN: HCPCS | Performed by: INTERNAL MEDICINE

## 2022-06-02 PROCEDURE — 1123F ACP DISCUSS/DSCN MKR DOCD: CPT | Performed by: INTERNAL MEDICINE

## 2022-06-02 PROCEDURE — G8417 CALC BMI ABV UP PARAM F/U: HCPCS | Performed by: INTERNAL MEDICINE

## 2022-06-02 PROCEDURE — 1036F TOBACCO NON-USER: CPT | Performed by: INTERNAL MEDICINE

## 2022-06-02 PROCEDURE — 99214 OFFICE O/P EST MOD 30 MIN: CPT | Performed by: INTERNAL MEDICINE

## 2022-06-02 RX ORDER — 0.9 % SODIUM CHLORIDE 0.9 %
500 INTRAVENOUS SOLUTION INTRAVENOUS ONCE
Status: CANCELLED | OUTPATIENT
Start: 2022-06-03 | End: 2022-06-03

## 2022-06-02 RX ORDER — 0.9 % SODIUM CHLORIDE 0.9 %
250 INTRAVENOUS SOLUTION INTRAVENOUS ONCE
Status: CANCELLED | OUTPATIENT
Start: 2022-06-03 | End: 2022-06-03

## 2022-06-02 NOTE — TELEPHONE ENCOUNTER
avs from 6/2/22     Need phlebotomy X 2 this month  rv in 6 months with cbc, ferritin prior     Phlebotomy's have been scheduled.  rv on 12/1/22 @ 11:45am. Labs will be done week prior (cbc,ferritin)       Pt was given AVS and appt schedule    Electronically signed by Tiesha Rinladi on 6/2/2022 at 2:01 PM

## 2022-06-02 NOTE — PROGRESS NOTES
DIAGNOSIS:   1. 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 But most of the elevation is related to acute inflammatory arthritis. 2. Anemia and thrombocytopenia. Concerning features - due to splenomegaly. 3. gouty arthritis with multiple subcutaneous nodules    CURRENT THERAPY:  Received therapeutic phlebotomy when the ferritin was above 1000 but ferritin improved significantly after control of arthritis  Started on Probenicid     BRIEF CASE HISTORY:  Jemma Goodwin is a very pleasant 64 y.o. male who is referred to us for elevated ferritin. He has history of CKD, stable. He has history of gout, gouty arthritis. He consumes alcohol rarely. CBC also showed anemia and thrombocytopenia. Follow up testing showed recovery of his hemoglobin but his platelets remain low. Work up done 01/29/2018 showed continued elevated ferritin and heterozygous mutation for H63D, his uric acid was elevated. He was started Probenecid. He was started on phlebotomy but things got much better with control of his gouty arthritis. INTERIM HISTORY: The patient presents to review for elevated ferritin and to review lab work. He reports he has been feeling well and denies any new pain. His hand arthralgias are unchanged, not following with specialist or on treatment. PAST MEDICAL HISTORY: has a past medical history of Acute renal failure ([address] Anemia, Catalepsy, CKD (chronic kidney disease) stage 3, GFR 30-59 ml/min (HCC), Depression, Gout, HTN (hypertension), Silicosis ([address] Skin cancer, Thrombocytopenia ([address] and Unspecified hypertensive kidney disease with chronic kidney disease stage I through stage IV, or unspecified(403.90). PAST SURGICAL HISTORY: has a past surgical history that includes Rotator cuff repair (Right); Lung biopsy; Colonoscopy (1/13/2012); Colonoscopy (3/30/2011); hernia repair (Right); Cataract removal with implant (Right, 02/04/2020);  Intracapsular cataract extraction (Right, 2/4/2020); Intracapsular cataract extraction (Left, 3/3/2020); Finger surgery (Right); Upper gastrointestinal endoscopy (N/A, 2/11/2022); and Colonoscopy (N/A, 2/11/2022). CURRENT MEDICATIONS:  has a current medication list which includes the following prescription(s): tamsulosin, amlodipine, and metoprolol succinate. ALLERGIES:  is allergic to latex, heparin, and lovenox [enoxaparin sodium]. FAMILY HISTORY: Negative for any hematological or oncological conditions. SOCIAL HISTORY:  reports that he quit smoking about 30 years ago. His smoking use included cigarettes. He has a 15.00 pack-year smoking history. He has never used smokeless tobacco. He reports current alcohol use. He reports that he does not use drugs. REVIEW OF SYSTEMS:   General: No fever or night sweats. Weight is stable. ENT: No double or blurred vision, no tinnitus or hearing problem, no dysphagia or sore throat   Respiratory: No chest pain, no shortness of breath, no cough or hemoptysis. Cardiovascular: Denies chest pain, PND or orthopnea. No L E swelling or palpitations. Gastrointestinal: No nausea or vomiting, abdominal pain, diarrhea or constipation. Genitourinary: Denies dysuria, hematuria, frequency, urgency or incontinence. Neurological: Denies headaches, decreased LOC, no sensory or motor focal deficits. Musculoskeletal: No arthralgia no back pain or joint swelling. Gouty arthritis with multiple nodules - stable   Skin: There are no rashes or bleeding. Psychiatric: No anxiety, no depression. Endocrine: No diabetes or thyroid disease. Hematologic: No bleeding, no adenopathy. PHYSICAL EXAM: Shows a well appearing 64y.o.-year-old male who is not in pain or distress. Vital Signs: Blood pressure 120/65, pulse 74, temperature 97.2 °F (36.2 °C), temperature source Temporal, weight 190 lb 11.2 oz (86.5 kg). HEENT: Normocephalic and atraumatic.  Pupils are equal, round, reactive to light and accommodation. Extraocular muscles are intact. Neck: Showed no JVD, no carotid bruit . Lungs: Clear to auscultation bilaterally. Heart: Regular without any murmur. Abdomen: Soft, nontender. No hepatosplenomegaly. Extremities: Gouty arthritis on the hands with multiple tophi. Lower extremities show no edema, clubbing, or cyanosis. Neuro exam: intact cranial nerves bilaterally no motor or sensory deficit, gait is normal. Lymphatic: no adenopathy appreciated in the supraclavicular, axillary, cervical or inguinal area    REVIEW OF LABORATORY DATA:   Lab Results   Component Value Date    WBC 3.5 05/18/2022    HGB 12.5 (L) 05/18/2022    HCT 37.7 (L) 05/18/2022    MCV 93.1 05/18/2022    PLT 87 (L) 05/18/2022       Chemistry        Component Value Date/Time     01/28/2022 0815    K 4.5 01/28/2022 0815     (H) 01/28/2022 0815    CO2 23 01/28/2022 0815    BUN 31 (H) 01/28/2022 0815    CREATININE 1.64 (H) 01/28/2022 0815        Component Value Date/Time    CALCIUM 9.1 01/28/2022 0815    ALKPHOS 100 12/07/2020 0756    AST 33 12/07/2020 0756    ALT 22 12/07/2020 0756    BILITOT 0.47 12/07/2020 0756        Lab Results   Component Value Date    IRON 87 01/29/2019    TIBC 272 01/29/2019    FERRITIN 813 (H) 05/18/2022       REVIEW OF RADIOLOGICAL RESULTS:        IMPRESSION:   1. Elevated ferritin-most likely related to chronic gouty arthritis but there is a component of iron overload secondary to H63D mutation. Likely a minor component. 2. Thrombocytopenia. 3. Hx of normocytic anemia, likely secondary to chronic arthritis  4. HX gout, CKD  5. 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 Elevated uric acid - started on Probenecid 02/2019  7. Drop in hemoglobin and platelets, likely secondary to acute infection. PLAN:   1. His lab work was reviewed, ferritin has increased, PLT remain low and continue to fluctuate.    2. I discussed plan for therapeutic phlebotomy, he is planning to leave the country soon and we will plan for tomorrow and again in 3 weeks, I am putting in orders. 3. We discussed his gouty arthritis, he does not wish to take medication to manage. 4. Return in 6 months with repeat lab work. Scribe Attestation   This note was created by Gio Esparza acting as scribe for the physician signing this note  Electronically Signed  Montserrat Mcdonough, 6/2/2022  Scribe, [employer]. Attending Attestation   Note was reviewed and edited.   I am in agreement with the note as entered    Deborah Brunner MD  Hematologist/Medical [id number] [facility] hematology oncology physicians

## 2022-06-07 ENCOUNTER — HOSPITAL ENCOUNTER (OUTPATIENT)
Dept: INFUSION THERAPY | Age: 77
Discharge: HOME | End: 2022-06-07
Payer: MEDICARE

## 2022-06-07 VITALS
TEMPERATURE: 97.6 F | DIASTOLIC BLOOD PRESSURE: 59 MMHG | HEART RATE: 75 BPM | RESPIRATION RATE: 16 BRPM | SYSTOLIC BLOOD PRESSURE: 97 MMHG

## 2022-06-07 DIAGNOSIS — R79.89 ELEVATED FERRITIN: Primary | ICD-10-CM

## 2022-06-07 PROCEDURE — 99195 PHLEBOTOMY: CPT

## 2022-06-07 RX ORDER — 0.9 % SODIUM CHLORIDE 0.9 %
250 INTRAVENOUS SOLUTION INTRAVENOUS ONCE
Status: CANCELLED | OUTPATIENT
Start: 2022-06-07 | End: 2022-06-07

## 2022-06-07 RX ORDER — 0.9 % SODIUM CHLORIDE 0.9 %
500 INTRAVENOUS SOLUTION INTRAVENOUS ONCE
Status: CANCELLED | OUTPATIENT
Start: 2022-06-07 | End: 2022-06-07

## 2022-06-07 NOTE — PROGRESS NOTES
Patient here for phlebotomy. Vitals stable. 500 ml blood removed and he became lightheaded and BP dropped post treatment. RN elevated his feet and gave him water. After 15 minutes he felt better. He was discharged home in stable condition. He is due to return 6/21 for next treatment.

## 2022-06-21 ENCOUNTER — HOSPITAL ENCOUNTER (OUTPATIENT)
Dept: INFUSION THERAPY | Age: 77
Discharge: HOME | End: 2022-06-21
Payer: MEDICARE

## 2022-06-21 VITALS
DIASTOLIC BLOOD PRESSURE: 64 MMHG | HEART RATE: 69 BPM | TEMPERATURE: 98.2 F | RESPIRATION RATE: 16 BRPM | SYSTOLIC BLOOD PRESSURE: 107 MMHG

## 2022-06-21 DIAGNOSIS — R79.89 ELEVATED FERRITIN: Primary | ICD-10-CM

## 2022-06-21 PROCEDURE — 99195 PHLEBOTOMY: CPT

## 2022-06-21 RX ORDER — 0.9 % SODIUM CHLORIDE 0.9 %
250 INTRAVENOUS SOLUTION INTRAVENOUS ONCE
OUTPATIENT
Start: 2022-06-21 | End: 2022-06-21

## 2022-06-21 RX ORDER — 0.9 % SODIUM CHLORIDE 0.9 %
500 INTRAVENOUS SOLUTION INTRAVENOUS ONCE
OUTPATIENT
Start: 2022-06-21 | End: 2022-06-21

## 2022-06-21 NOTE — PROGRESS NOTES
Patient here for therapeutic phlebotomy. Vitals stable. 500 ml blood removed. He tolerated treatment well and was discharged home in stable condition.

## 2022-09-28 ENCOUNTER — OFFICE VISIT (OUTPATIENT)
Dept: GASTROENTEROLOGY | Age: 77
End: 2022-09-28
Payer: MEDICARE

## 2022-09-28 VITALS
WEIGHT: 187 LBS | SYSTOLIC BLOOD PRESSURE: 105 MMHG | DIASTOLIC BLOOD PRESSURE: 57 MMHG | BODY MASS INDEX: 26.08 KG/M2 | HEART RATE: 74 BPM

## 2022-09-28 DIAGNOSIS — R13.19 ESOPHAGEAL DYSPHAGIA: Primary | ICD-10-CM

## 2022-09-28 PROCEDURE — G8427 DOCREV CUR MEDS BY ELIG CLIN: HCPCS | Performed by: NURSE PRACTITIONER

## 2022-09-28 PROCEDURE — 1036F TOBACCO NON-USER: CPT | Performed by: NURSE PRACTITIONER

## 2022-09-28 PROCEDURE — G8417 CALC BMI ABV UP PARAM F/U: HCPCS | Performed by: NURSE PRACTITIONER

## 2022-09-28 PROCEDURE — 99213 OFFICE O/P EST LOW 20 MIN: CPT | Performed by: NURSE PRACTITIONER

## 2022-09-28 PROCEDURE — 1123F ACP DISCUSS/DSCN MKR DOCD: CPT | Performed by: NURSE PRACTITIONER

## 2022-09-28 ASSESSMENT — ENCOUNTER SYMPTOMS
TROUBLE SWALLOWING: 1
GASTROINTESTINAL NEGATIVE: 1
RESPIRATORY NEGATIVE: 1
EYES NEGATIVE: 1
ALLERGIC/IMMUNOLOGIC NEGATIVE: 1

## 2022-09-28 NOTE — PROGRESS NOTES
GI CLINIC FOLLOW UP    INTERVAL HISTORY:   No referring provider defined for this encounter. No chief complaint on file. HISTORY OF PRESENT ILLNESS:     Patient being seen for follow-up dysphagia. Patient was advised esophagram however this was not done. Patient has intermittent dysphagia with solids. Denies any issues with liquids. No hx of complete obstruction. On EGD found to have likely esophageal motility issues, ? Prominent cricopharyngeal bar. Bowel movements are satisfactory. No melena, hematochezia. Past Medical,Family, and Social History reviewed and does contribute to the patient presentingcondition. Patient's PMH/PSH,SH,PSYCH Hx, MEDs, ALLERGIES, and ROS were all reviewed and updated in the appropriate sections.     PAST MEDICAL HISTORY:  Past Medical History:   Diagnosis Date    Acute renal failure ([address]     Anemia     Catalepsy 03/03/2020    [facility]    CKD (chronic kidney disease) stage 3, GFR 30-59 ml/min (HCC)     Depression     Gout     HTN (hypertension)     Silicosis (HCC)     Skin cancer     Thrombocytopenia (HCC)     Unspecified hypertensive kidney disease with chronic kidney disease stage I through stage IV, or unspecified(403.90) 4/15/2015       Past Surgical History:   Procedure Laterality Date    CATARACT EXTRACTION W/  INTRAOCULAR LENS IMPLANT Right 02/04/2020    R[facility]oul/[facility]    COLONOSCOPY  1/13/2012    diverticulosisbenign large intestinal mucosa with focal mild increase in lymphoplasmacytic content of lamina propria    COLONOSCOPY  3/30/2011    diverticulosis, tubular adenoma right colon, sigmoid colon-villotubular adenoma    COLONOSCOPY N/A 2/11/2022    COLONOSCOPY WITH BIOPSY performed by Shara Tierney MD at [facility] Right     middle finger    HERNIA REPAIR Right     INTRACAPSULAR CATARACT EXTRACTION Right 2/4/2020    EYE CATARACT EMULSIFICATION IOL IMPLANT performed by Dmitry Cunningham MD at [address]  INTRACAPSULAR CATARACT EXTRACTION Left 3/3/2020    EYE CATARACT EMULSIFICATION IOL IMPLANT  ENHANCED TORIC  LENS performed by Martin Alejo MD at [facility] Right     UPPER GASTROINTESTINAL ENDOSCOPY N/A 2022    EGD BIOPSY WITH DILATION performed by Nikos Mayes MD at [facility]:    Current Outpatient Medications:     tamsulosin (FLOMAX) 0.4 MG capsule, Take 1 capsule by mouth daily Take one capsule daily to facilitate passage of ureteral stone, Disp: 90 capsule, Rfl: 3    amLODIPine (NORVASC) 5 MG tablet, Take 5 mg by mouth daily , Disp: , Rfl: 0    metoprolol succinate (TOPROL XL) 50 MG extended release tablet, Take 50 mg by mouth daily , Disp: , Rfl: 3    ALLERGIES:   Allergies   Allergen Reactions    Latex     Heparin     Lovenox [Enoxaparin Sodium]        FAMILY HISTORY:       Problem Relation Age of Onset    Cancer Sister         female         SOCIAL HISTORY:   Social History     Socioeconomic History    Marital status:      Spouse name: Not on file    Number of children: Not on file    Years of education: Not on file    Highest education level: Not on file   Occupational History    Not on file   Tobacco Use    Smoking status: Former     Packs/day: 1.00     Years: 15.00     Pack years: 15.00     Types: Cigarettes     Quit date: 3/24/1992     Years since quittin.5    Smokeless tobacco: Never   Vaping Use    Vaping Use: Never used   Substance and Sexual Activity    Alcohol use: Yes     Comment: sometimes     Drug use: No    Sexual activity: Not on file   Other Topics Concern    Not on file   Social History Narrative    Not on file     Social Determinants of Health     Financial Resource Strain: Not on file   Food Insecurity: Not on file   Transportation Needs: Not on file   Physical Activity: Not on file   Stress: Not on file   Social Connections: Not on file   Intimate Partner Violence: Not on file   Housing Stability: Not on file       REVIEW OF SYSTEMS: A 12-point review of systemswas obtained and pertinent positives and negatives were enumerated above in the history of present illness. All other reviewed systems / symptoms were negative. Review of Systems   Constitutional: Negative. HENT:  Positive for trouble swallowing (only with dry foods). Eyes: Negative. Respiratory: Negative. Cardiovascular: Negative. Gastrointestinal: Negative. Endocrine: Negative. Genitourinary: Negative. Musculoskeletal: Negative. Skin: Negative. Allergic/Immunologic: Negative. Neurological: Negative. Hematological: Negative. Psychiatric/Behavioral: Negative. PHYSICAL EXAMINATION: Vital signs reviewed per the nursing documentation. There were no vitals taken for this visit. There is no height or weight on file to calculate BMI. Physical Exam  Constitutional:       Appearance: Normal appearance. Eyes:      General: No scleral icterus. Pupils: Pupils are equal, round, and reactive to light. Cardiovascular:      Rate and Rhythm: Normal rate and regular rhythm. Heart sounds: Normal heart sounds. Pulmonary:      Effort: Pulmonary effort is normal.      Breath sounds: Normal breath sounds. Abdominal:      General: Bowel sounds are normal. There is no distension. Palpations: Abdomen is soft. There is no mass. Tenderness: There is no abdominal tenderness. There is no guarding. Skin:     General: Skin is warm and dry. Coloration: Skin is not jaundiced. Neurological:      Mental Status: He is alert and oriented to person, place, and time. Mental status is at baseline.          LABORATORY DATA: Reviewed  Lab Results   Component Value Date    WBC 3.5 05/18/2022    HGB 12.5 (L) 05/18/2022    HCT 37.7 (L) 05/18/2022    MCV 93.1 05/18/2022    PLT 87 (L) 05/18/2022     01/28/2022    K 4.5 01/28/2022     (H) 01/28/2022    CO2 23 01/28/2022    BUN 31 (H) 01/28/2022

## 2022-11-28 DIAGNOSIS — D69.6 THROMBOCYTOPENIA (HCC): Primary | ICD-10-CM

## 2022-12-01 ENCOUNTER — HOSPITAL ENCOUNTER (OUTPATIENT)
Age: 77
Discharge: HOME | End: 2022-12-01
Payer: MEDICARE

## 2022-12-01 ENCOUNTER — OFFICE VISIT (OUTPATIENT)
Dept: ONCOLOGY | Age: 77
End: 2022-12-01
Payer: MEDICARE

## 2022-12-01 ENCOUNTER — TELEPHONE (OUTPATIENT)
Dept: ONCOLOGY | Age: 77
End: 2022-12-01

## 2022-12-01 VITALS
HEART RATE: 70 BPM | DIASTOLIC BLOOD PRESSURE: 73 MMHG | WEIGHT: 194 LBS | BODY MASS INDEX: 27.06 KG/M2 | TEMPERATURE: 97 F | SYSTOLIC BLOOD PRESSURE: 150 MMHG

## 2022-12-01 DIAGNOSIS — D69.6 THROMBOCYTOPENIA (HCC): Primary | ICD-10-CM

## 2022-12-01 DIAGNOSIS — D69.6 THROMBOCYTOPENIA (HCC): ICD-10-CM

## 2022-12-01 LAB
ABSOLUTE EOS #: 0.1 K/UL (ref 0–0.4)
ABSOLUTE LYMPH #: 0.8 K/UL (ref 1–4.8)
ABSOLUTE MONO #: 0.3 K/UL (ref 0.1–1.2)
BASOPHILS # BLD: 1 % (ref 0–2)
BASOPHILS ABSOLUTE: 0 K/UL (ref 0–0.2)
EOSINOPHILS RELATIVE PERCENT: 2 % (ref 1–4)
FERRITIN: 416 NG/ML (ref 30–400)
HCT VFR BLD CALC: 37.5 % (ref 41–53)
HEMOGLOBIN: 12.4 G/DL (ref 13.5–17.5)
LYMPHOCYTES # BLD: 20 % (ref 24–44)
MCH RBC QN AUTO: 30.7 PG (ref 26–34)
MCHC RBC AUTO-ENTMCNC: 33.1 G/DL (ref 31–37)
MCV RBC AUTO: 92.6 FL (ref 80–100)
MONOCYTES # BLD: 8 % (ref 2–11)
PDW BLD-RTO: 15.8 % (ref 12.5–15.4)
PLATELET # BLD: 86 K/UL (ref 140–450)
PMV BLD AUTO: 8.1 FL (ref 6–12)
RBC # BLD: 4.04 M/UL (ref 4.5–5.9)
SEG NEUTROPHILS: 69 % (ref 36–66)
SEGMENTED NEUTROPHILS ABSOLUTE COUNT: 2.8 K/UL (ref 1.8–7.7)
WBC # BLD: 4 K/UL (ref 3.5–11)

## 2022-12-01 PROCEDURE — G8484 FLU IMMUNIZE NO ADMIN: HCPCS | Performed by: INTERNAL MEDICINE

## 2022-12-01 PROCEDURE — 36415 COLL VENOUS BLD VENIPUNCTURE: CPT

## 2022-12-01 PROCEDURE — 1036F TOBACCO NON-USER: CPT | Performed by: INTERNAL MEDICINE

## 2022-12-01 PROCEDURE — 85025 COMPLETE CBC W/AUTO DIFF WBC: CPT

## 2022-12-01 PROCEDURE — 99211 OFF/OP EST MAY X REQ PHY/QHP: CPT | Performed by: INTERNAL MEDICINE

## 2022-12-01 PROCEDURE — 1123F ACP DISCUSS/DSCN MKR DOCD: CPT | Performed by: INTERNAL MEDICINE

## 2022-12-01 PROCEDURE — G8427 DOCREV CUR MEDS BY ELIG CLIN: HCPCS | Performed by: INTERNAL MEDICINE

## 2022-12-01 PROCEDURE — 82728 ASSAY OF FERRITIN: CPT

## 2022-12-01 PROCEDURE — 99214 OFFICE O/P EST MOD 30 MIN: CPT | Performed by: INTERNAL MEDICINE

## 2022-12-01 PROCEDURE — G8417 CALC BMI ABV UP PARAM F/U: HCPCS | Performed by: INTERNAL MEDICINE

## 2022-12-01 NOTE — TELEPHONE ENCOUNTER
AVS from 12/1/22    Rv in one year with cbc, cmp ferritin uric acid prior to RV    RV scheduled for 12/12/23 @ 10am  Pt will have labs drawn one week before    PT was given AVS and appt schedule

## 2022-12-01 NOTE — PROGRESS NOTES
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 But most of the elevation is related to acute inflammatory arthritis. Anemia and thrombocytopenia. Concerning features - due to splenomegaly. Gouty arthritis with multiple subcutaneous nodules    CURRENT THERAPY:  Received therapeutic phlebotomy when the ferritin was above 1000 but ferritin improved significantly after control of arthritis  Started on Probenicid     BRIEF CASE HISTORY:  Darion Marinelli is a very pleasant 76 y.o. male who is referred to us for elevated ferritin. He has history of CKD, stable. He has history of gout, gouty arthritis. He consumes alcohol rarely. CBC also showed anemia and thrombocytopenia. Follow up testing showed recovery of his hemoglobin but his platelets remain low. Work up done 01/29/2018 showed continued elevated ferritin and heterozygous mutation for H63D, his uric acid was elevated. He was started Probenecid. He was started on phlebotomy but things got much better with control of his gouty arthritis. INTERIM HISTORY: The patient presents to review for elevated ferritin and to review lab work. His gout remains active and poorly controlled. He has no new symptoms or concerns. PAST MEDICAL HISTORY: has a past medical history of Acute renal failure ([address] Anemia, Catalepsy, CKD (chronic kidney disease) stage 3, GFR 30-59 ml/min ([facility]), Depression, Gout, HTN (hypertension), Silicosis ([address] Skin cancer, Thrombocytopenia ([address] and Unspecified hypertensive kidney disease with chronic kidney disease stage I through stage IV, or unspecified(403.90). PAST SURGICAL HISTORY: has a past surgical history that includes Rotator cuff repair (Right); Lung biopsy; Colonoscopy (1/13/2012); Colonoscopy (3/30/2011); hernia repair (Right); Cataract removal with implant (Right, 02/04/2020); Intracapsular cataract extraction (Right, 2/4/2020);  Intracapsular cataract extraction (Left, 3/3/2020); Finger surgery (Right); Upper gastrointestinal endoscopy (N/A, 2/11/2022); and Colonoscopy (N/A, 2/11/2022). CURRENT MEDICATIONS:  has a current medication list which includes the following prescription(s): tamsulosin, amlodipine, and metoprolol succinate. ALLERGIES:  is allergic to latex, heparin, and lovenox [enoxaparin sodium]. FAMILY HISTORY: Negative for any hematological or oncological conditions. SOCIAL HISTORY:  reports that he quit smoking about 30 years ago. His smoking use included cigarettes. He has a 15.00 pack-year smoking history. He has never used smokeless tobacco. He reports current alcohol use. He reports that he does not use drugs. REVIEW OF SYSTEMS:   General: No fever or night sweats. Weight is stable. ENT: No double or blurred vision, no tinnitus or hearing problem, no dysphagia or sore throat   Respiratory: No chest pain, no shortness of breath, no cough or hemoptysis. Cardiovascular: Denies chest pain, PND or orthopnea. No L E swelling or palpitations. Gastrointestinal: No nausea or vomiting, abdominal pain, diarrhea or constipation. Genitourinary: Denies dysuria, hematuria, frequency, urgency or incontinence. Neurological: Denies headaches, decreased LOC, no sensory or motor focal deficits. Musculoskeletal: No arthralgia no back pain or joint swelling. Gouty arthritis with multiple nodules - stable   Skin: There are no rashes or bleeding. Psychiatric: No anxiety, no depression. Endocrine: No diabetes or thyroid disease. Hematologic: No bleeding, no adenopathy. PHYSICAL EXAM: Shows a well appearing 76y.o.-year-old male who is not in pain or distress. Vital Signs: Blood pressure (!) 150/73, pulse 70, temperature 97 °F (36.1 °C), temperature source Temporal, weight 194 lb (88 kg). HEENT: Normocephalic and atraumatic. Pupils are equal, round, reactive to light and accommodation. Extraocular muscles are intact.  Neck: Showed no JVD, no carotid bruit . Lungs: Clear to auscultation bilaterally. Heart: Regular without any murmur. Abdomen: Soft, nontender. No hepatosplenomegaly. Extremities: Gouty arthritis on the hands with multiple tophi. Lower extremities show no edema, clubbing, or cyanosis. Neuro exam: intact cranial nerves bilaterally no motor or sensory deficit, gait is normal. Lymphatic: no adenopathy appreciated in the supraclavicular, axillary, cervical or inguinal area    REVIEW OF LABORATORY DATA:   Lab Results   Component Value Date    WBC 4.0 12/01/2022    HGB 12.4 (L) 12/01/2022    HCT 37.5 (L) 12/01/2022    MCV 92.6 12/01/2022    PLT 86 (L) 12/01/2022       Chemistry        Component Value Date/Time     01/28/2022 0815    K 4.5 01/28/2022 0815     (H) 01/28/2022 0815    CO2 23 01/28/2022 0815    BUN 31 (H) 01/28/2022 0815    CREATININE 1.64 (H) 01/28/2022 0815        Component Value Date/Time    CALCIUM 9.1 01/28/2022 0815    ALKPHOS 100 12/07/2020 0756    AST 33 12/07/2020 0756    ALT 22 12/07/2020 0756    BILITOT 0.47 12/07/2020 0756        Lab Results   Component Value Date    IRON 87 01/29/2019    TIBC 272 01/29/2019    FERRITIN 813 (H) 05/18/2022       REVIEW OF RADIOLOGICAL RESULTS:        IMPRESSION:   Elevated ferritin-most likely related to chronic gouty arthritis but there is a component of iron overload secondary to H63D mutation. Likely a minor component. Thrombocytopenia. 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 He is doing well form a hematological perspective. His gout remains poorly controlled, he declined referral to specialist.   Return in 1 year with lab work.      Scribe Attestation   This note was created by El Storey acting as scribe for the physician signing this note  Electronically Signed  Paola Tovar, 12/1/2022  Scribe, [employer]. Attending Attestation   Note was reviewed and edited.   I am in agreement with the note as entered    Weston Brunner MD  Hematologist/Medical [id number] [facility] hematology oncology physicians

## 2022-12-21 ENCOUNTER — HOSPITAL ENCOUNTER (OUTPATIENT)
Age: 77
Discharge: HOME | End: 2022-12-23
Payer: MEDICARE

## 2022-12-21 ENCOUNTER — HOSPITAL ENCOUNTER (OUTPATIENT)
Dept: GENERAL RADIOLOGY | Age: 77
Discharge: HOME | End: 2022-12-23
Payer: MEDICARE

## 2022-12-21 DIAGNOSIS — J84.89 BOOP (BRONCHIOLITIS OBLITERANS WITH ORGANIZING PNEUMONIA) (HCC): ICD-10-CM

## 2022-12-21 PROCEDURE — 71046 X-RAY EXAM CHEST 2 VIEWS: CPT

## 2023-06-06 SDOH — HEALTH STABILITY: PHYSICAL HEALTH: ON AVERAGE, HOW MANY DAYS PER WEEK DO YOU ENGAGE IN MODERATE TO STRENUOUS EXERCISE (LIKE A BRISK WALK)?: 3 DAYS

## 2023-06-06 SDOH — HEALTH STABILITY: PHYSICAL HEALTH: ON AVERAGE, HOW MANY MINUTES DO YOU ENGAGE IN EXERCISE AT THIS LEVEL?: 20 MIN

## 2023-06-07 ENCOUNTER — OFFICE VISIT (OUTPATIENT)
Dept: INTERNAL MEDICINE CLINIC | Age: 78
End: 2023-06-07
Payer: MEDICARE

## 2023-06-07 VITALS
DIASTOLIC BLOOD PRESSURE: 68 MMHG | WEIGHT: 194 LBS | BODY MASS INDEX: 27.06 KG/M2 | SYSTOLIC BLOOD PRESSURE: 130 MMHG | OXYGEN SATURATION: 98 % | HEART RATE: 80 BPM

## 2023-06-07 DIAGNOSIS — I10 ESSENTIAL HYPERTENSION: Primary | ICD-10-CM

## 2023-06-07 DIAGNOSIS — N18.32 STAGE 3B CHRONIC KIDNEY DISEASE (HCC): ICD-10-CM

## 2023-06-07 PROCEDURE — G8427 DOCREV CUR MEDS BY ELIG CLIN: HCPCS | Performed by: INTERNAL MEDICINE

## 2023-06-07 PROCEDURE — 1123F ACP DISCUSS/DSCN MKR DOCD: CPT | Performed by: INTERNAL MEDICINE

## 2023-06-07 PROCEDURE — 99202 OFFICE O/P NEW SF 15 MIN: CPT | Performed by: INTERNAL MEDICINE

## 2023-06-07 PROCEDURE — 3078F DIAST BP <80 MM HG: CPT | Performed by: INTERNAL MEDICINE

## 2023-06-07 PROCEDURE — 3075F SYST BP GE 130 - 139MM HG: CPT | Performed by: INTERNAL MEDICINE

## 2023-06-07 PROCEDURE — G8417 CALC BMI ABV UP PARAM F/U: HCPCS | Performed by: INTERNAL MEDICINE

## 2023-06-07 PROCEDURE — 1036F TOBACCO NON-USER: CPT | Performed by: INTERNAL MEDICINE

## 2023-06-07 ASSESSMENT — PATIENT HEALTH QUESTIONNAIRE - PHQ9
SUM OF ALL RESPONSES TO PHQ QUESTIONS 1-9: 0
1. LITTLE INTEREST OR PLEASURE IN DOING THINGS: 0
SUM OF ALL RESPONSES TO PHQ QUESTIONS 1-9: 0
2. FEELING DOWN, DEPRESSED OR HOPELESS: 0
SUM OF ALL RESPONSES TO PHQ9 QUESTIONS 1 & 2: 0

## 2023-06-07 ASSESSMENT — COPD QUESTIONNAIRES: COPD: 1

## 2023-06-07 NOTE — PROGRESS NOTES
[address] [address]  Dept: [phone]  Dept Fax: [phone]    Vance Mercado is a 76 y.o. male who presents today for his medicalconditions/complaints as noted below. Vance Mercado is c/o of New Patient      HPI:     Hypertension  This is a chronic problem. The current episode started more than 1 year ago. The problem is unchanged. The problem is controlled. There are no known risk factors for coronary artery disease. Past treatments include calcium channel blockers, central alpha agonists and beta blockers. The current treatment provides significant improvement. Hypertensive end-organ damage includes kidney disease. COPD  Primary symptoms comments: Has silicosis follows with Dr. Jennifer Rivera. This is a chronic problem. The current episode started more than 1 year ago. His symptoms are aggravated by occupational exposure. His symptoms are alleviated by nothing. Saw Dr. Hermes Armstrong for BPH. Is on Flomax. Past Medical History:   Diagnosis Date    Acute renal [address]     Anemia     Catalepsy 03/03/2020    [facility]    Chronic kidney disease 2008    Hx of Dialysis    CKD (chronic kidney disease) stage 3, GFR 30-59 ml/min (HCC)     Depression     Gout     HTN (hypertension)     Silicosis (HCC)     Skin cancer     Thrombocytopenia (HCC)     Unspecified hypertensive kidney disease with chronic kidney disease stage I through stage IV, or unspecified(403.90) 04/15/2015        Current Outpatient Medications   Medication Sig Dispense Refill    tamsulosin (FLOMAX) 0.4 MG capsule Take 1 capsule by mouth daily Take one capsule daily to facilitate passage of ureteral stone 90 capsule 3    amLODIPine (NORVASC) 5 MG tablet Take 1 tablet by mouth daily  0    metoprolol succinate (TOPROL XL) 50 MG extended release tablet Take 1 tablet by mouth daily  3     No current facility-administered medications for this visit.

## 2023-09-14 ENCOUNTER — APPOINTMENT (OUTPATIENT)
Dept: GENERAL RADIOLOGY | Age: 78
DRG: 149 | End: 2023-09-14
Payer: MEDICARE

## 2023-09-14 ENCOUNTER — APPOINTMENT (OUTPATIENT)
Dept: CT IMAGING | Age: 78
DRG: 149 | End: 2023-09-14
Payer: MEDICARE

## 2023-09-14 ENCOUNTER — HOSPITAL ENCOUNTER (INPATIENT)
Age: 78
LOS: 2 days | Discharge: HOME | DRG: 149 | End: 2023-09-16
Attending: EMERGENCY MEDICINE | Admitting: INTERNAL MEDICINE
Payer: MEDICARE

## 2023-09-14 DIAGNOSIS — R42 DIZZINESS: ICD-10-CM

## 2023-09-14 DIAGNOSIS — R11.2 NAUSEA AND VOMITING, UNSPECIFIED VOMITING TYPE: Primary | ICD-10-CM

## 2023-09-14 LAB
ALBUMIN SERPL-MCNC: 4 G/DL (ref 3.5–5.2)
ALP SERPL-CCNC: 100 U/L (ref 40–129)
ALT SERPL-CCNC: 11 U/L (ref 5–41)
ANION GAP SERPL CALCULATED.3IONS-SCNC: 11 MMOL/L (ref 9–17)
AST SERPL-CCNC: 26 U/L
BACTERIA URNS QL MICRO: ABNORMAL
BASOPHILS # BLD: 0 K/UL (ref 0–0.2)
BASOPHILS NFR BLD: 1 % (ref 0–2)
BILIRUB SERPL-MCNC: 0.4 MG/DL (ref 0.3–1.2)
BILIRUB UR QL STRIP: NEGATIVE
BUN SERPL-MCNC: 39 MG/DL (ref 8–23)
CALCIUM SERPL-MCNC: 9.3 MG/DL (ref 8.6–10.4)
CASTS #/AREA URNS LPF: ABNORMAL /LPF
CHLORIDE SERPL-SCNC: 109 MMOL/L (ref 98–107)
CLARITY UR: CLEAR
CO2 SERPL-SCNC: 17 MMOL/L (ref 20–31)
COLOR UR: YELLOW
CREAT SERPL-MCNC: 1.9 MG/DL (ref 0.7–1.2)
EOSINOPHIL # BLD: 0.1 K/UL (ref 0–0.4)
EOSINOPHILS RELATIVE PERCENT: 1 % (ref 0–4)
EPI CELLS #/AREA URNS HPF: ABNORMAL /HPF
ERYTHROCYTE [DISTWIDTH] IN BLOOD BY AUTOMATED COUNT: 13.7 % (ref 11.5–14.9)
GFR SERPL CREATININE-BSD FRML MDRD: 35 ML/MIN/1.73M2
GLUCOSE SERPL-MCNC: 159 MG/DL (ref 70–99)
GLUCOSE UR STRIP-MCNC: NEGATIVE MG/DL
HCT VFR BLD AUTO: 36.8 % (ref 41–53)
HGB BLD-MCNC: 12.2 G/DL (ref 13.5–17.5)
HGB UR QL STRIP.AUTO: NEGATIVE
KETONES UR STRIP-MCNC: NEGATIVE MG/DL
LEUKOCYTE ESTERASE UR QL STRIP: NEGATIVE
LIPASE SERPL-CCNC: 47 U/L (ref 13–60)
LYMPHOCYTES NFR BLD: 0.5 K/UL (ref 1–4.8)
LYMPHOCYTES RELATIVE PERCENT: 9 % (ref 24–44)
MCH RBC QN AUTO: 30.4 PG (ref 26–34)
MCHC RBC AUTO-ENTMCNC: 33.1 G/DL (ref 31–37)
MCV RBC AUTO: 91.7 FL (ref 80–100)
MONOCYTES NFR BLD: 0.3 K/UL (ref 0.1–1.3)
MONOCYTES NFR BLD: 5 % (ref 1–7)
NEUTROPHILS NFR BLD: 84 % (ref 36–66)
NEUTS SEG NFR BLD: 4.6 K/UL (ref 1.3–9.1)
NITRITE UR QL STRIP: NEGATIVE
PH UR STRIP: 5.5 [PH] (ref 5–8)
PLATELET # BLD AUTO: 111 K/UL (ref 150–450)
PMV BLD AUTO: 8.2 FL (ref 6–12)
POTASSIUM SERPL-SCNC: 4.8 MMOL/L (ref 3.7–5.3)
PROT SERPL-MCNC: 7.6 G/DL (ref 6.4–8.3)
PROT UR STRIP-MCNC: ABNORMAL MG/DL
RBC # BLD AUTO: 4.01 M/UL (ref 4.5–5.9)
RBC #/AREA URNS HPF: ABNORMAL /HPF
SODIUM SERPL-SCNC: 137 MMOL/L (ref 135–144)
SP GR UR STRIP: 1.01 (ref 1–1.03)
TROPONIN I SERPL HS-MCNC: 28 NG/L (ref 0–22)
TROPONIN I SERPL HS-MCNC: 32 NG/L (ref 0–22)
UROBILINOGEN UR STRIP-ACNC: NORMAL EU/DL (ref 0–1)
WBC #/AREA URNS HPF: ABNORMAL /HPF
WBC OTHER # BLD: 5.5 K/UL (ref 3.5–11)

## 2023-09-14 PROCEDURE — 2580000003 HC RX 258: Performed by: STUDENT IN AN ORGANIZED HEALTH CARE EDUCATION/TRAINING PROGRAM

## 2023-09-14 PROCEDURE — 83690 ASSAY OF LIPASE: CPT

## 2023-09-14 PROCEDURE — 96374 THER/PROPH/DIAG INJ IV PUSH: CPT

## 2023-09-14 PROCEDURE — 36415 COLL VENOUS BLD VENIPUNCTURE: CPT

## 2023-09-14 PROCEDURE — 74176 CT ABD & PELVIS W/O CONTRAST: CPT

## 2023-09-14 PROCEDURE — 81001 URINALYSIS AUTO W/SCOPE: CPT

## 2023-09-14 PROCEDURE — 1200000000 HC SEMI PRIVATE

## 2023-09-14 PROCEDURE — 2580000003 HC RX 258: Performed by: NURSE PRACTITIONER

## 2023-09-14 PROCEDURE — 93005 ELECTROCARDIOGRAM TRACING: CPT | Performed by: STUDENT IN AN ORGANIZED HEALTH CARE EDUCATION/TRAINING PROGRAM

## 2023-09-14 PROCEDURE — 6360000002 HC RX W HCPCS: Performed by: STUDENT IN AN ORGANIZED HEALTH CARE EDUCATION/TRAINING PROGRAM

## 2023-09-14 PROCEDURE — 96375 TX/PRO/DX INJ NEW DRUG ADDON: CPT

## 2023-09-14 PROCEDURE — 99285 EMERGENCY DEPT VISIT HI MDM: CPT

## 2023-09-14 PROCEDURE — 71045 X-RAY EXAM CHEST 1 VIEW: CPT

## 2023-09-14 PROCEDURE — 84484 ASSAY OF TROPONIN QUANT: CPT

## 2023-09-14 PROCEDURE — 6370000000 HC RX 637 (ALT 250 FOR IP): Performed by: STUDENT IN AN ORGANIZED HEALTH CARE EDUCATION/TRAINING PROGRAM

## 2023-09-14 PROCEDURE — 80053 COMPREHEN METABOLIC PANEL: CPT

## 2023-09-14 PROCEDURE — 85025 COMPLETE CBC W/AUTO DIFF WBC: CPT

## 2023-09-14 PROCEDURE — 96361 HYDRATE IV INFUSION ADD-ON: CPT

## 2023-09-14 PROCEDURE — 70450 CT HEAD/BRAIN W/O DYE: CPT

## 2023-09-14 RX ORDER — SODIUM CHLORIDE 0.9 % (FLUSH) 0.9 %
5-40 SYRINGE (ML) INJECTION EVERY 12 HOURS SCHEDULED
Status: DISCONTINUED | OUTPATIENT
Start: 2023-09-14 | End: 2023-09-16 | Stop reason: HOSPADM

## 2023-09-14 RX ORDER — ONDANSETRON 4 MG/1
4 TABLET, ORALLY DISINTEGRATING ORAL EVERY 8 HOURS PRN
Status: DISCONTINUED | OUTPATIENT
Start: 2023-09-14 | End: 2023-09-16 | Stop reason: HOSPADM

## 2023-09-14 RX ORDER — ONDANSETRON 2 MG/ML
4 INJECTION INTRAMUSCULAR; INTRAVENOUS ONCE
Status: COMPLETED | OUTPATIENT
Start: 2023-09-14 | End: 2023-09-14

## 2023-09-14 RX ORDER — SODIUM CHLORIDE 0.9 % (FLUSH) 0.9 %
5-40 SYRINGE (ML) INJECTION PRN
Status: DISCONTINUED | OUTPATIENT
Start: 2023-09-14 | End: 2023-09-16 | Stop reason: HOSPADM

## 2023-09-14 RX ORDER — ACETAMINOPHEN 650 MG/1
650 SUPPOSITORY RECTAL EVERY 6 HOURS PRN
Status: DISCONTINUED | OUTPATIENT
Start: 2023-09-14 | End: 2023-09-16 | Stop reason: HOSPADM

## 2023-09-14 RX ORDER — 0.9 % SODIUM CHLORIDE 0.9 %
1000 INTRAVENOUS SOLUTION INTRAVENOUS ONCE
Status: COMPLETED | OUTPATIENT
Start: 2023-09-14 | End: 2023-09-14

## 2023-09-14 RX ORDER — ONDANSETRON 2 MG/ML
4 INJECTION INTRAMUSCULAR; INTRAVENOUS EVERY 6 HOURS PRN
Status: DISCONTINUED | OUTPATIENT
Start: 2023-09-14 | End: 2023-09-16 | Stop reason: HOSPADM

## 2023-09-14 RX ORDER — MECLIZINE HYDROCHLORIDE 25 MG/1
25 TABLET ORAL 3 TIMES DAILY PRN
COMMUNITY

## 2023-09-14 RX ORDER — POLYETHYLENE GLYCOL 3350 17 G/17G
17 POWDER, FOR SOLUTION ORAL DAILY PRN
Status: DISCONTINUED | OUTPATIENT
Start: 2023-09-14 | End: 2023-09-16 | Stop reason: HOSPADM

## 2023-09-14 RX ORDER — SODIUM CHLORIDE 9 MG/ML
INJECTION, SOLUTION INTRAVENOUS CONTINUOUS
Status: DISCONTINUED | OUTPATIENT
Start: 2023-09-14 | End: 2023-09-15

## 2023-09-14 RX ORDER — ACETAMINOPHEN 325 MG/1
650 TABLET ORAL EVERY 6 HOURS PRN
Status: DISCONTINUED | OUTPATIENT
Start: 2023-09-14 | End: 2023-09-16 | Stop reason: HOSPADM

## 2023-09-14 RX ORDER — METOCLOPRAMIDE HYDROCHLORIDE 5 MG/ML
10 INJECTION INTRAMUSCULAR; INTRAVENOUS ONCE
Status: COMPLETED | OUTPATIENT
Start: 2023-09-14 | End: 2023-09-14

## 2023-09-14 RX ORDER — MECLIZINE HYDROCHLORIDE 25 MG/1
25 TABLET ORAL ONCE
Status: COMPLETED | OUTPATIENT
Start: 2023-09-14 | End: 2023-09-14

## 2023-09-14 RX ORDER — SODIUM CHLORIDE 9 MG/ML
INJECTION, SOLUTION INTRAVENOUS PRN
Status: DISCONTINUED | OUTPATIENT
Start: 2023-09-14 | End: 2023-09-16 | Stop reason: HOSPADM

## 2023-09-14 RX ADMIN — MECLIZINE HYDROCHLORIDE 25 MG: 25 TABLET ORAL at 20:33

## 2023-09-14 RX ADMIN — METOCLOPRAMIDE 10 MG: 5 INJECTION, SOLUTION INTRAMUSCULAR; INTRAVENOUS at 20:35

## 2023-09-14 RX ADMIN — SODIUM CHLORIDE: 9 INJECTION, SOLUTION INTRAVENOUS at 23:56

## 2023-09-14 RX ADMIN — SODIUM CHLORIDE 1000 ML: 9 INJECTION, SOLUTION INTRAVENOUS at 19:00

## 2023-09-14 RX ADMIN — ONDANSETRON 4 MG: 2 INJECTION INTRAMUSCULAR; INTRAVENOUS at 19:02

## 2023-09-14 ASSESSMENT — ENCOUNTER SYMPTOMS
SINUS PAIN: 0
SHORTNESS OF BREATH: 0
SORE THROAT: 0
COUGH: 0
PHOTOPHOBIA: 0
DIARRHEA: 0
WHEEZING: 0
VOMITING: 1
RHINORRHEA: 0
SINUS PRESSURE: 0
BACK PAIN: 0
TROUBLE SWALLOWING: 0
ABDOMINAL PAIN: 1
NAUSEA: 1

## 2023-09-14 ASSESSMENT — PAIN - FUNCTIONAL ASSESSMENT: PAIN_FUNCTIONAL_ASSESSMENT: NONE - DENIES PAIN

## 2023-09-14 NOTE — ED NOTES
Pt up to bedside with assistance to use urinal. Pt does have an unsteady gait while standing. Urine labeled at bedside and sent to lab. Pt reports worsened dizziness and nausea after getting up for restroom.       Krissy Hernandez RN  09/14/23 1952

## 2023-09-14 NOTE — ED TRIAGE NOTES
Mode of arrival (squad #, walk in, police, etc) : walk-in         Chief complaint(s): N/V dizziness         Arrival Note (brief scenario, treatment PTA, etc). : patient comes for the above symptoms since this morning         C= \"Have you ever felt that you should Cut down on your drinking? \"  No  A= \"Have people Annoyed you by criticizing your drinking? \"  No  G= \"Have you ever felt bad or Guilty about your drinking? \"  No  E= \"Have you ever had a drink as an Eye-opener first thing in the morning to steady your nerves or to help a hangover? \"  No      Deferred []      Reason for deferring: N/A    *If yes to two or more: probable alcohol abuse. *

## 2023-09-14 NOTE — ED NOTES
Verbal shift change report given to Guillermo Mcpherson RN (oncoming nurse) by Chelita Gallagher RN (offgoing nurse). Report included the following information Nurse Handoff Report, ED Encounter Summary, ED SBAR, Adult Overview, MAR, and Recent Results.        Charlotte Shah RN  09/14/23 0947

## 2023-09-14 NOTE — ED PROVIDER NOTES
[address] [facility] [facility] ED  Emergency Department Encounter  Emergency Medicine Resident     Pt Nate Rodas  MRN: [id number]  [address] [facility] 1945  Date of evaluation: 9/14/23  PCP:  Chanell Cardenas MD  Note Started: 7:17 PM EDT      CHIEF COMPLAINT       Chief Complaint   Patient presents with    Dizziness    Nausea    Emesis       HISTORY OF PRESENT ILLNESS  (Location/Symptom, Timing/Onset, Context/Setting, Quality, Duration, Modifying Factors, Severity.)      Anthony Morales is a 76 y.o. male with PMH including CKD3 and HTN who presents to the ED 4 to 5 hours of abdominal pain with nausea and vomiting with associated dizziness. Patient is unable to tolerate solids and liquids. Denies blood in vomit and diarrhea. He is fatigued appearing and responds in 1-2 word sentences given his nausea with fatigue. He denies chest pain, shortness of breath, headaches, visual changes, back pains, neck pains, fevers, and falls due to dizziness. Accompanied to the emergency room by his neighbor who was called by the patient's daughter due to her concerns for \"lethargy. \"  He is A&O x3 with GCS 15 with a soft, nontender abdomen on arrival.    PAST MEDICAL / SURGICAL / SOCIAL / FAMILY HISTORY      has a past medical history of Acute renal failure ([address]), Anemia, Catalepsy, Chronic kidney disease, CKD (chronic kidney disease) stage 3, GFR 30-59 ml/min ([address]), Depression, Gout, HTN (hypertension), Silicosis ([address]), Skin cancer, Thrombocytopenia ([address]), and Unspecified hypertensive kidney disease with chronic kidney disease stage I through stage IV, or unspecified(403.90). has a past surgical history that includes Rotator cuff repair (Right); Lung biopsy; Colonoscopy (1/13/2012); Colonoscopy (3/30/2011); hernia repair (Right); Cataract removal with implant (Right, 02/04/2020); Intracapsular cataract extraction (Right, 2/4/2020); Intracapsular cataract extraction (Left, 3/3/2020);  Finger surgery (Right); Upper gastrointestinal endoscopy (N/A, 2022); and Colonoscopy (N/A, 2022). Social History     Socioeconomic History    Marital status:      Spouse name: Not on file    Number of children: Not on file    Years of education: Not on file    Highest education level: Not on file   Occupational History    Not on file   Tobacco Use    Smoking status: Former     Packs/day: 1.00     Years: 15.00     Additional pack years: 0.00     Total pack years: 15.00     Types: Cigarettes     Quit date: 3/24/1992     Years since quittin.4    Smokeless tobacco: Never   Vaping Use    Vaping Use: Never used   Substance and Sexual Activity    Alcohol use: Yes     Alcohol/week: 1.0 standard drink of alcohol     Types: 1 Drinks containing 0.5 oz of alcohol per week     Comment: sometimes     Drug use: No    Sexual activity: Not on file   Other Topics Concern    Not on file   Social History Narrative    Not on file     Social Determinants of Health     Financial Resource Strain: Not on file   Food Insecurity: Not on file   Transportation Needs: Not on file   Physical Activity: Insufficiently Active (2023)    Exercise Vital Sign     Days of Exercise per Week: 3 days     Minutes of Exercise per Session: 20 min   Stress: Not on file   Social Connections: Not on file   Intimate Partner Violence: Not At Risk (2023)    Humiliation, Afraid, Rape, and Kick questionnaire     Fear of Current or Ex-Partner: No     Emotionally Abused: No     Physically Abused: No     Sexually Abused: No   Housing Stability: Not on file       Family History   Problem Relation Age of Onset    Cancer Sister         female       Allergies:  Latex, Heparin, and Lovenox [enoxaparin sodium]    Home Medications:  Prior to Admission medications    Medication Sig Start Date End Date Taking?  Authorizing Provider   meclizine (ANTIVERT) 25 MG tablet Take 1 tablet by mouth 3 times daily as needed   Yes Historical Provider, MD   tamsulosin (FLOMAX) 0.4

## 2023-09-15 ENCOUNTER — APPOINTMENT (OUTPATIENT)
Dept: MRI IMAGING | Age: 78
DRG: 149 | End: 2023-09-15
Payer: MEDICARE

## 2023-09-15 ENCOUNTER — APPOINTMENT (OUTPATIENT)
Dept: ULTRASOUND IMAGING | Age: 78
DRG: 149 | End: 2023-09-15
Payer: MEDICARE

## 2023-09-15 ENCOUNTER — APPOINTMENT (OUTPATIENT)
Age: 78
DRG: 149 | End: 2023-09-15
Payer: MEDICARE

## 2023-09-15 PROBLEM — R11.2 NAUSEA & VOMITING: Status: ACTIVE | Noted: 2023-09-15

## 2023-09-15 PROBLEM — D73.1 THROMBOCYTOPENIA DUE TO HYPERSPLENISM: Status: ACTIVE | Noted: 2019-01-29

## 2023-09-15 PROBLEM — N40.0 BPH (BENIGN PROSTATIC HYPERPLASIA): Status: ACTIVE | Noted: 2023-09-15

## 2023-09-15 PROBLEM — K80.20 CHOLELITHIASIS: Status: ACTIVE | Noted: 2023-09-15

## 2023-09-15 PROBLEM — H81.23 VESTIBULAR NEURONITIS OF BOTH EARS: Status: ACTIVE | Noted: 2023-09-15

## 2023-09-15 PROBLEM — D64.9 CHRONIC ANEMIA: Status: ACTIVE | Noted: 2023-09-15

## 2023-09-15 PROBLEM — I10 ESSENTIAL HYPERTENSION: Status: ACTIVE | Noted: 2023-09-15

## 2023-09-15 PROBLEM — D69.59 THROMBOCYTOPENIA DUE TO HYPERSPLENISM: Status: ACTIVE | Noted: 2019-01-29

## 2023-09-15 LAB
ANION GAP SERPL CALCULATED.3IONS-SCNC: 10 MMOL/L (ref 9–17)
BACTERIA URNS QL MICRO: ABNORMAL
BASOPHILS # BLD: 0 K/UL (ref 0–0.2)
BASOPHILS NFR BLD: 1 % (ref 0–2)
BILIRUB UR QL STRIP: NEGATIVE
BUN SERPL-MCNC: 35 MG/DL (ref 8–23)
CALCIUM SERPL-MCNC: 8.6 MG/DL (ref 8.6–10.4)
CASTS #/AREA URNS LPF: ABNORMAL /LPF
CHLORIDE SERPL-SCNC: 109 MMOL/L (ref 98–107)
CHLORIDE UR-SCNC: 61 MMOL/L
CK SERPL-CCNC: 79 U/L (ref 39–308)
CLARITY UR: CLEAR
CO2 SERPL-SCNC: 17 MMOL/L (ref 20–31)
COLOR UR: YELLOW
CREAT SERPL-MCNC: 1.7 MG/DL (ref 0.7–1.2)
CREAT UR-MCNC: 92.7 MG/DL (ref 39–259)
ECHO AO ROOT DIAM: 4.1 CM
ECHO AO ROOT INDEX: 1.99 CM/M2
ECHO AV AREA PEAK VELOCITY: 3.7 CM2
ECHO AV AREA VTI: 3.6 CM2
ECHO AV AREA/BSA PEAK VELOCITY: 1.8 CM2/M2
ECHO AV AREA/BSA VTI: 1.7 CM2/M2
ECHO AV MEAN GRADIENT: 3 MMHG
ECHO AV MEAN VELOCITY: 0.8 M/S
ECHO AV PEAK GRADIENT: 5 MMHG
ECHO AV PEAK VELOCITY: 1.1 M/S
ECHO AV VELOCITY RATIO: 0.73
ECHO AV VTI: 27.4 CM
ECHO BSA: 2.08 M2
ECHO LA AREA 2C: 19.6 CM2
ECHO LA AREA 4C: 16.2 CM2
ECHO LA DIAMETER INDEX: 2.43 CM/M2
ECHO LA DIAMETER: 5 CM
ECHO LA MAJOR AXIS: 5.5 CM
ECHO LA MINOR AXIS: 5.9 CM
ECHO LA TO AORTIC ROOT RATIO: 1.22
ECHO LA VOL 2C: 54 ML (ref 18–58)
ECHO LA VOL 4C: 38 ML (ref 18–58)
ECHO LA VOL BP: 46 ML (ref 18–58)
ECHO LA VOL/BSA BIPLANE: 22 ML/M2 (ref 16–34)
ECHO LA VOLUME INDEX A2C: 26 ML/M2 (ref 16–34)
ECHO LA VOLUME INDEX A4C: 18 ML/M2 (ref 16–34)
ECHO LV E' LATERAL VELOCITY: 6 CM/S
ECHO LV E' SEPTAL VELOCITY: 5 CM/S
ECHO LV FRACTIONAL SHORTENING: 37 % (ref 28–44)
ECHO LV INTERNAL DIMENSION DIASTOLE INDEX: 2.38 CM/M2
ECHO LV INTERNAL DIMENSION DIASTOLIC: 4.9 CM (ref 4.2–5.9)
ECHO LV INTERNAL DIMENSION SYSTOLIC INDEX: 1.5 CM/M2
ECHO LV INTERNAL DIMENSION SYSTOLIC: 3.1 CM
ECHO LV IVSD: 1.3 CM (ref 0.6–1)
ECHO LV MASS 2D: 267.9 G (ref 88–224)
ECHO LV MASS INDEX 2D: 130.1 G/M2 (ref 49–115)
ECHO LV POSTERIOR WALL DIASTOLIC: 1.4 CM (ref 0.6–1)
ECHO LV RELATIVE WALL THICKNESS RATIO: 0.57
ECHO LVOT AREA: 4.9 CM2
ECHO LVOT AV VTI INDEX: 0.72
ECHO LVOT DIAM: 2.5 CM
ECHO LVOT MEAN GRADIENT: 1 MMHG
ECHO LVOT PEAK GRADIENT: 3 MMHG
ECHO LVOT PEAK VELOCITY: 0.8 M/S
ECHO LVOT STROKE VOLUME INDEX: 47.2 ML/M2
ECHO LVOT SV: 97.1 ML
ECHO LVOT VTI: 19.8 CM
ECHO MV A VELOCITY: 0.87 M/S
ECHO MV AREA VTI: 4.1 CM2
ECHO MV E DECELERATION TIME (DT): 197 MS
ECHO MV E VELOCITY: 0.63 M/S
ECHO MV E/A RATIO: 0.72
ECHO MV E/E' LATERAL: 10.5
ECHO MV E/E' RATIO (AVERAGED): 11.55
ECHO MV E/E' SEPTAL: 12.6
ECHO MV LVOT VTI INDEX: 1.19
ECHO MV MAX VELOCITY: 1 M/S
ECHO MV MEAN GRADIENT: 2 MMHG
ECHO MV MEAN VELOCITY: 0.6 M/S
ECHO MV PEAK GRADIENT: 4 MMHG
ECHO MV VTI: 23.5 CM
ECHO RA AREA 4C: 15 CM2
ECHO RA END SYSTOLIC VOLUME APICAL 4 CHAMBER INDEX BSA: 17 ML/M2
ECHO RA VOLUME: 35 ML
ECHO RV TAPSE: 2.2 CM (ref 1.7–?)
ECHO TV REGURGITANT MAX VELOCITY: 2.29 M/S
ECHO TV REGURGITANT PEAK GRADIENT: 19 MMHG
EKG ATRIAL RATE: 87 BPM
EKG P AXIS: 47 DEGREES
EKG P-R INTERVAL: 208 MS
EKG Q-T INTERVAL: 370 MS
EKG QRS DURATION: 92 MS
EKG QTC CALCULATION (BAZETT): 445 MS
EKG R AXIS: 37 DEGREES
EKG T AXIS: 33 DEGREES
EKG VENTRICULAR RATE: 87 BPM
EOSINOPHIL # BLD: 0.1 K/UL (ref 0–0.4)
EOSINOPHIL,URINE: NORMAL
EOSINOPHILS RELATIVE PERCENT: 2 % (ref 0–4)
EPI CELLS #/AREA URNS HPF: ABNORMAL /HPF
ERYTHROCYTE [DISTWIDTH] IN BLOOD BY AUTOMATED COUNT: 13.5 % (ref 11.5–14.9)
GFR SERPL CREATININE-BSD FRML MDRD: 41 ML/MIN/1.73M2
GLUCOSE SERPL-MCNC: 93 MG/DL (ref 70–99)
GLUCOSE UR STRIP-MCNC: NEGATIVE MG/DL
HCT VFR BLD AUTO: 31.4 % (ref 41–53)
HGB BLD-MCNC: 10.5 G/DL (ref 13.5–17.5)
HGB UR QL STRIP.AUTO: NEGATIVE
KETONES UR STRIP-MCNC: NEGATIVE MG/DL
LEUKOCYTE ESTERASE UR QL STRIP: NEGATIVE
LYMPHOCYTES NFR BLD: 0.6 K/UL (ref 1–4.8)
LYMPHOCYTES RELATIVE PERCENT: 20 % (ref 24–44)
MAGNESIUM SERPL-MCNC: 1.8 MG/DL (ref 1.6–2.6)
MCH RBC QN AUTO: 31.3 PG (ref 26–34)
MCHC RBC AUTO-ENTMCNC: 33.5 G/DL (ref 31–37)
MCV RBC AUTO: 93.5 FL (ref 80–100)
MONOCYTES NFR BLD: 0.2 K/UL (ref 0.1–1.3)
MONOCYTES NFR BLD: 8 % (ref 1–7)
NEUTROPHILS NFR BLD: 69 % (ref 36–66)
NEUTS SEG NFR BLD: 2 K/UL (ref 1.3–9.1)
NITRITE UR QL STRIP: NEGATIVE
PH UR STRIP: 5 [PH] (ref 5–8)
PHOSPHATE SERPL-MCNC: 3.5 MG/DL (ref 2.5–4.5)
PLATELET # BLD AUTO: 94 K/UL (ref 150–450)
PMV BLD AUTO: 8.2 FL (ref 6–12)
POTASSIUM SERPL-SCNC: 5.3 MMOL/L (ref 3.7–5.3)
POTASSIUM SERPL-SCNC: 5.5 MMOL/L (ref 3.7–5.3)
PROT UR STRIP-MCNC: ABNORMAL MG/DL
RBC # BLD AUTO: 3.36 M/UL (ref 4.5–5.9)
RBC #/AREA URNS HPF: ABNORMAL /HPF
SODIUM SERPL-SCNC: 136 MMOL/L (ref 135–144)
SODIUM UR-SCNC: 61 MMOL/L
SP GR UR STRIP: 1.01 (ref 1–1.03)
TOTAL PROTEIN, URINE: 16 MG/DL
TSH SERPL DL<=0.05 MIU/L-ACNC: 2.54 UIU/ML (ref 0.3–5)
URATE SERPL-MCNC: 8 MG/DL (ref 3.4–7)
URINE TOTAL PROTEIN CREATININE RATIO: 0.17 (ref 0–0.2)
UROBILINOGEN UR STRIP-ACNC: NORMAL EU/DL (ref 0–1)
WBC #/AREA URNS HPF: ABNORMAL /HPF
WBC OTHER # BLD: 2.8 K/UL (ref 3.5–11)

## 2023-09-15 PROCEDURE — 97166 OT EVAL MOD COMPLEX 45 MIN: CPT

## 2023-09-15 PROCEDURE — 82550 ASSAY OF CK (CPK): CPT

## 2023-09-15 PROCEDURE — 93306 TTE W/DOPPLER COMPLETE: CPT | Performed by: INTERNAL MEDICINE

## 2023-09-15 PROCEDURE — 86225 DNA ANTIBODY NATIVE: CPT

## 2023-09-15 PROCEDURE — 80048 BASIC METABOLIC PNL TOTAL CA: CPT

## 2023-09-15 PROCEDURE — 84100 ASSAY OF PHOSPHORUS: CPT

## 2023-09-15 PROCEDURE — 84156 ASSAY OF PROTEIN URINE: CPT

## 2023-09-15 PROCEDURE — 36415 COLL VENOUS BLD VENIPUNCTURE: CPT

## 2023-09-15 PROCEDURE — 82436 ASSAY OF URINE CHLORIDE: CPT

## 2023-09-15 PROCEDURE — 84443 ASSAY THYROID STIM HORMONE: CPT

## 2023-09-15 PROCEDURE — 84132 ASSAY OF SERUM POTASSIUM: CPT

## 2023-09-15 PROCEDURE — 99222 1ST HOSP IP/OBS MODERATE 55: CPT | Performed by: PSYCHIATRY & NEUROLOGY

## 2023-09-15 PROCEDURE — 84550 ASSAY OF BLOOD/URIC ACID: CPT

## 2023-09-15 PROCEDURE — 84300 ASSAY OF URINE SODIUM: CPT

## 2023-09-15 PROCEDURE — 86038 ANTINUCLEAR ANTIBODIES: CPT

## 2023-09-15 PROCEDURE — 93306 TTE W/DOPPLER COMPLETE: CPT

## 2023-09-15 PROCEDURE — 86235 NUCLEAR ANTIGEN ANTIBODY: CPT

## 2023-09-15 PROCEDURE — 2580000003 HC RX 258: Performed by: INTERNAL MEDICINE

## 2023-09-15 PROCEDURE — 70551 MRI BRAIN STEM W/O DYE: CPT

## 2023-09-15 PROCEDURE — 81001 URINALYSIS AUTO W/SCOPE: CPT

## 2023-09-15 PROCEDURE — 2500000003 HC RX 250 WO HCPCS: Performed by: INTERNAL MEDICINE

## 2023-09-15 PROCEDURE — 6370000000 HC RX 637 (ALT 250 FOR IP): Performed by: NURSE PRACTITIONER

## 2023-09-15 PROCEDURE — 87205 SMEAR GRAM STAIN: CPT

## 2023-09-15 PROCEDURE — 1200000000 HC SEMI PRIVATE

## 2023-09-15 PROCEDURE — 85025 COMPLETE CBC W/AUTO DIFF WBC: CPT

## 2023-09-15 PROCEDURE — 82570 ASSAY OF URINE CREATININE: CPT

## 2023-09-15 PROCEDURE — 97162 PT EVAL MOD COMPLEX 30 MIN: CPT

## 2023-09-15 PROCEDURE — 93010 ELECTROCARDIOGRAM REPORT: CPT | Performed by: INTERNAL MEDICINE

## 2023-09-15 PROCEDURE — 83735 ASSAY OF MAGNESIUM: CPT

## 2023-09-15 RX ORDER — MECLIZINE HYDROCHLORIDE 25 MG/1
25 TABLET ORAL 3 TIMES DAILY PRN
Status: DISCONTINUED | OUTPATIENT
Start: 2023-09-15 | End: 2023-09-16 | Stop reason: HOSPADM

## 2023-09-15 RX ORDER — AMLODIPINE BESYLATE 10 MG/1
10 TABLET ORAL DAILY
Status: DISCONTINUED | OUTPATIENT
Start: 2023-09-15 | End: 2023-09-16 | Stop reason: HOSPADM

## 2023-09-15 RX ORDER — METOPROLOL SUCCINATE 50 MG/1
50 TABLET, EXTENDED RELEASE ORAL DAILY
Status: DISCONTINUED | OUTPATIENT
Start: 2023-09-15 | End: 2023-09-16 | Stop reason: HOSPADM

## 2023-09-15 RX ADMIN — AMLODIPINE BESYLATE 10 MG: 10 TABLET ORAL at 09:46

## 2023-09-15 RX ADMIN — SODIUM BICARBONATE: 84 INJECTION, SOLUTION INTRAVENOUS at 11:16

## 2023-09-15 RX ADMIN — MECLIZINE HYDROCHLORIDE 25 MG: 25 TABLET ORAL at 09:46

## 2023-09-15 RX ADMIN — SODIUM BICARBONATE: 84 INJECTION, SOLUTION INTRAVENOUS at 22:18

## 2023-09-15 RX ADMIN — METOPROLOL SUCCINATE 50 MG: 50 TABLET, EXTENDED RELEASE ORAL at 09:46

## 2023-09-15 NOTE — PLAN OF CARE
Please fill out the electronic MRI Screening form with the patient, or the patient representative. Any questions. .please call [facility] MRI @ [phone]. MRI exam will be scheduled after receiving the completed screening form.  Thank you!!

## 2023-09-15 NOTE — CONSULTS
[address] [facility] Neurology   IN-PATIENT SERVICE      NEUROLOGY CONSULT  NOTE            Date:   9/15/2023  Patient name:  Laure Forbes  Date of admission:  9/14/2023  YOB: 1945      Chief Complaint:     Chief Complaint   Patient presents with    Dizziness    Nausea    Emesis       Reason for Consult:      Dizziness    History of Present Illness: The patient is a 76 y.o. male who presents with Dizziness, Nausea, and Emesis  . The patient was seen and examined and the chart was reviewed. Patient with PMH significant for HTN, CKD stage III b, BPH, COPD, silicosis, anemia, gout and depression presented to ER with complaints of dizziness, nausea and vomiting that started about 5 hours before coming to ER. Patient was accompanied by his neighbor who was called by his daughter as she was concerned about the patient's fatigue and lethargy. He was also complaining of associated ringing sensation in his ears and lightheadedness which were worsening when he tried to stand up. He was not able to tolerate oral intake. Patient says that he has been experiencing the symptoms for some time now but yesterday he was not able to find any relief. His home medications shows that he was taking meclizine 25 mg 3 times daily as needed at home but he is not sure exactly why and who started the medication. His EKG showed no acute changes. Troponin were initially elevated to 32 and trended down to 28. His serum lipase was 47. CT abdomen pelvis showed cholelithiasis and splenomegaly as well as small pericardial effusion. CT brain showed mild central and cortical cerebral atrophy with chronic deep white matter ischemic changes but no acute intracranial abnormalities.      Past Medical History:     Past Medical History:   Diagnosis Date    Acute renal failure ([address])     Anemia     Catalepsy 03/03/2020    [facility]    Chronic kidney disease 2008    Hx of Dialysis    CKD (chronic kidney disease)

## 2023-09-15 NOTE — ACP (ADVANCE CARE PLANNING)
Advance Care Planning     Advance Care Planning Activator (Inpatient)  Conversation Note      Date of ACP Conversation: 9/15/2023     Conversation Conducted with: Patient with Decision Making Capacity    ACP Activator: Xuan Potter RN    Health Care Decision Maker:     Current Designated Health Care Decision Maker:     Primary Decision Maker: Tennille Crossivette (hipaa),Anaya - Child - [phone]    Primary Decision Maker: Tory Vargas - Child - [phone]  Click here to complete Healthcare Decision Makers including section of the Healthcare Decision Maker Relationship (ie \"Primary\")  Today we documented Decision Maker(s) consistent with Legal Next of Kin hierarchy. Care Preferences    Ventilation: \"If you were in your present state of health and suddenly became very ill and were unable to breathe on your own, what would your preference be about the use of a ventilator (breathing machine) if it were available to you? \"      Would the patient desire the use of ventilator (breathing machine)?: yes    \"If your health worsens and it becomes clear that your chance of recovery is unlikely, what would your preference be about the use of a ventilator (breathing machine) if it were available to you? \"     Would the patient desire the use of ventilator (breathing machine)?: Yes      Resuscitation  \"CPR works best to restart the heart when there is a sudden event, like a heart attack, in someone who is otherwise healthy. Unfortunately, CPR does not typically restart the heart for people who have serious health conditions or who are very sick. \"    \"In the event your heart stopped as a result of an underlying serious health condition, would you want attempts to be made to restart your heart (answer \"yes\" for attempt to resuscitate) or would you prefer a natural death (answer \"no\" for do not attempt to resuscitate)? \" yes       [] Yes   [] No   Educated Patient / Shelah Link regarding differences between Advance

## 2023-09-15 NOTE — CARE COORDINATION
Case Management Assessment  Initial Evaluation    Date/Time of Evaluation: 9/15/2023 11:16 AM  Assessment Completed by: Ana Cristina Cantu RN    If patient is discharged prior to next notation, then this note serves as note for discharge by case management. Patient Name: Zoila Caruso                   YOB: 1945  Diagnosis: Dizziness [R42]  Nausea and vomiting, unspecified vomiting type [R11.2]                   Date / Time: 9/14/2023  6:22 PM    Patient Admission Status: Inpatient   Readmission Risk (Low < 19, Mod (19-27), High > 27): Readmission Risk Score: 14.5    Current PCP: Ashvin Harley MD  PCP verified by CM? Yes    Chart Reviewed: Yes      History Provided by: Patient  Patient Orientation: Alert and Oriented    Patient Cognition: Alert    Hospitalization in the last 30 days (Readmission):  No    If yes, Readmission Assessment in CM Navigator will be completed. Advance Directives:      Code Status: Full Code   Patient's Primary Decision Maker is: Legal Next of Kin    Primary Decision Maker: Michelle Simon (hipaa),Anaya  Child - [phone]    Primary Decision Maker: Armaulikkaren Rolan  Child - [phone]    Discharge Planning:    Patient lives with: Alone Type of Home: House  Primary Care Giver: Self  Patient Support Systems include: Children   Current Financial resources: Medicare  Current community resources: None  Current services prior to admission: None            Current DME:              Type of Home Care services:  None    ADLS  Prior functional level: Independent in ADLs/IADLs  Current functional level: Independent in ADLs/IADLs    PT AM-PAC:   /24  OT AM-PAC:   /24    Family can provide assistance at DC: Yes  Would you like Case Management to discuss the discharge plan with any other family members/significant others, and if so, who?  No  Plans to Return to Present Housing: Yes  Other Identified Issues/Barriers to RETURNING to current housing: Pt states having a head cold x3wks   Sinus pressure/HA and congestion, post nasal drip, cough-nonproductive. Nasal Drainage-tan in color. Pt tried mucinex and otc cold medicine w/ slight alleviation. Pt takes motrin for HA w/ some alleviation.

## 2023-09-15 NOTE — ED NOTES
Pt and pt visitor updated on plan of care. Lights dimmed for comfort. Pt denies any other needs at this time.       Lincoln Zavaleta RN  09/14/23 [id number]

## 2023-09-15 NOTE — PROGRESS NOTES
Education  Patient Education  Education Given To: Patient; Other (Comment) (dtr)  Education Provided: Role of Therapy;Plan of Care  Education Method: Demonstration;Verbal  Barriers to Learning: None  Education Outcome: Verbalized understanding;Continued education needed      Therapy Time   Individual Concurrent Group Co-treatment   Time In [address] [facility]         Time Out 1435         Minutes [id number] [facility]

## 2023-09-15 NOTE — H&P
grossly intact  Skin: No gross lesions, rashes, bruising or bleeding on exposed skin area  Extremities: peripheral pulses palpable, no pedal edema or calf pain with palpation  Psych: normal affect    Investigations:      Laboratory Testing:  Recent Results (from the past 24 hour(s))   Comprehensive Metabolic Panel w/ Reflex to MG    Collection Time: 09/14/23  6:40 PM   Result Value Ref Range    Sodium 137 135 - 144 mmol/L    Potassium 4.8 3.7 - 5.3 mmol/L    Chloride 109 (H) 98 - 107 mmol/L    CO2 17 (L) 20 - 31 mmol/L    Anion Gap 11 9 - 17 mmol/L    Glucose 159 (H) 70 - 99 mg/dL    BUN 39 (H) 8 - 23 mg/dL    Creatinine 1.9 (H) 0.7 - 1.2 mg/dL    Est, Glom Filt Rate 35 (L) >60 mL/min/1.73m2    Calcium 9.3 8.6 - 10.4 mg/dL    Total Protein 7.6 6.4 - 8.3 g/dL    Albumin 4.0 3.5 - 5.2 g/dL    Total Bilirubin 0.4 0.3 - 1.2 mg/dL    Alkaline Phosphatase 100 40 - 129 U/L    ALT 11 5 - 41 U/L    AST 26 <40 U/L   CBC with Auto Differential    Collection Time: 09/14/23  6:40 PM   Result Value Ref Range    WBC 5.5 3.5 - 11.0 k/uL    RBC 4.01 (L) 4.5 - 5.9 m/uL    Hemoglobin 12.2 (L) 13.5 - 17.5 g/dL    Hematocrit 36.8 (L) 41 - 53 %    MCV 91.7 80 - 100 fL    MCH 30.4 26 - 34 pg    MCHC 33.1 31 - 37 g/dL    RDW 13.7 11.5 - 14.9 %    Platelets 914 (L) 732 - 450 k/uL    MPV 8.2 6.0 - 12.0 fL    Neutrophils % 84 (H) 36 - 66 %    Lymphocytes % 9 (L) 24 - 44 %    Monocytes % 5 1 - 7 %    Eosinophils % 1 0 - 4 %    Basophils % 1 0 - 2 %    Neutrophils Absolute 4.60 1.3 - 9.1 k/uL    Lymphocytes Absolute 0.50 (L) 1.0 - 4.8 k/uL    Monocytes Absolute 0.30 0.1 - 1.3 k/uL    Eosinophils Absolute 0.10 0.0 - 0.4 k/uL    Basophils Absolute 0.00 0.0 - 0.2 k/uL   Lipase    Collection Time: 09/14/23  6:40 PM   Result Value Ref Range    Lipase 47 13 - 60 U/L   Troponin    Collection Time: 09/14/23  6:40 PM   Result Value Ref Range    Troponin, High Sensitivity 32 (H) 0 - 22 ng/L   EKG 12 Lead    Collection Time: 09/14/23  7:23 PM   Result length of stay > 48 hours. Riki Womack MD  Internal medicine resident, PGY-2  9/15/2023  10:40 AM    Attestation and add on       I have discussed the care of Lucia Moncada , including pertinent history and exam findings,      9/15/23    with the resident. I have seen and examined the patient and the key elements of all parts of the encounter have been performed by me . I agree with the assessment, plan and orders as documented by the resident. Possible vestibular vestibular neuronitis associated nausea vomiting  Patient used to be on Antivert  We will resume Antivert 25 3 times daily as needed  Pressure is okay blood pressure stable  Patient is feeling better today   We will observe  Hopefully will be able to discharge tomorrow     Amina Lizarraga MD      [address], [address] Phone [phone]   Fax: [phone]  Answering Service: [phone]                Copy sent to Dr. Elli Stevenson MD    Please note that this chart was generated using voice recognition Dragon dictation software. Although every effort was made to ensure the accuracy of this automated transcription, some errors in transcription may have occurred.

## 2023-09-15 NOTE — PROGRESS NOTES
None    Result Date: 9/14/2023  Cholelithiasis Splenomegaly No acute intra-abdominal intrapelvic abnormalities. Right basal reticulonodular densities and bibasal emphysematous changes. Small pericardial effusion. RECOMMENDATIONS: Consider follow-up CT chest for further evaluation of lower chest findings     XR CHEST PORTABLE    Result Date: 9/14/2023  Stable chronic changes. .         Plan:     Patient status inpatient in the Med/Surge    Dizziness  -associated with nausea, vomiting, weakness  -increased with standing  -Check orthostatic VS q shift  -EKG NSR  -2D Echocardiogram  -IVF  -Neuro checks q 4 hours  -PT & OT eval and treat  -Check CBC, BMP, Mg, Phos, TSH  -UA negative   -MRI Brain W/O contrast    Full code       Queen Hugo, MARIA DEL ROSARIO - NP  9/15/2023  2:06 AM      Please note that this chart was generated using voice recognition Dragon dictation software. Although every effort was made to ensure the accuracy of this automated transcription, some errors in transcription may have occurred. [address] [facility]  [address] [facility], [address]    Phone [phone]

## 2023-09-15 NOTE — PLAN OF CARE
Problem: Discharge Planning  Goal: Discharge to home or other facility with appropriate resources  9/15/2023 1648 by Akilah Jolly RN  Outcome: Progressing  Flowsheets (Taken 9/15/2023 0800)  Discharge to home or other facility with appropriate resources:   Identify discharge learning needs (meds, wound care, etc)   Arrange for interpreters to assist at discharge as needed   Arrange for needed discharge resources and transportation as appropriate   Identify barriers to discharge with patient and caregiver  9/15/2023 0346 by Everardo Charles RN  Outcome: Progressing  Flowsheets (Taken 9/15/2023 0346)  Discharge to home or other facility with appropriate resources: Identify barriers to discharge with patient and caregiver     Problem: Skin/Tissue Integrity  Goal: Absence of new skin breakdown  Description: 1. Monitor for areas of redness and/or skin breakdown  2. Assess vascular access sites hourly  3. Every 4-6 hours minimum:  Change oxygen saturation probe site  4. Every 4-6 hours:  If on nasal continuous positive airway pressure, respiratory therapy assess nares and determine need for appliance change or resting period.   9/15/2023 1648 by Akilah Jolly RN  Outcome: Progressing  9/15/2023 0346 by Everardo Charles RN  Outcome: Progressing     Problem: Safety - Adult  Goal: Free from fall injury  9/15/2023 1648 by Akilah Jolly RN  Outcome: Progressing  Flowsheets (Taken 9/15/2023 1040)  Free From Fall Injury: Carolina Pal family/caregiver on patient safety  9/15/2023 0346 by Everardo Charles RN  Outcome: Progressing  Flowsheets (Taken 9/15/2023 0346)  Free From Fall Injury: Instruct family/caregiver on patient safety

## 2023-09-15 NOTE — PROGRESS NOTES
Pharmacy Medication History Note      List of current medications patient is taking is complete. Source of information: patient, dispense report, OARRS    Changes made to medication list:  Medications flagged for removal (include reason, ex. noncompliance):  None     Medications removed (include reason, ex. therapy complete or physician discontinued):  None     Medications added/doses adjusted:  Meclizine 25 mg three times daily as needed added     Other notes (ex. Recent course of antibiotics, Coumadin dosing): The patient is uncertain if he is taking tamsulosin. It was filled on 9/6/23 for a 90 day supply. The patient's daughter reported he takes colchicine 0.6 mg daily, however this has not been filled since 2021. OARRS negative    Denies use of other OTC or herbal medications.       Allergies clarified    Medication list provided to the patient: no   Medication education provided to the patient: none      Electronically signed by Abimael [facility] on 9/14/2023 at 8:28 PM

## 2023-09-15 NOTE — PROGRESS NOTES
[address] [facility]   Occupational Therapy Evaluation  Date: 9/15/23  Patient Name: Claudette Song       Room: 9977/9872-69  MRN: [id number]  Account: [de-identified]   : 1945  (73 y.o.) Gender: male     Discharge Recommendations: The patient's needs are being met with no further Occupational Therapy recommended at discharge. OT Equipment Recommendations  Other: TBD    Referring Practitioner: MARIA DEL ROSARIO Fu NP  Diagnosis: Dizziness      Treatment Diagnosis: Impaired self care status    Past Medical History:  has a past medical history of Acute renal failure ([address]), Anemia, Catalepsy, Chronic kidney disease, CKD (chronic kidney disease) stage 3, GFR 30-59 ml/min ([address]), Depression, Gout, HTN (hypertension), Silicosis ([address]), Skin cancer, Thrombocytopenia ([address]), and Unspecified hypertensive kidney disease with chronic kidney disease stage I through stage IV, or unspecified(403.90). Past Surgical History:   has a past surgical history that includes Rotator cuff repair (Right); Lung biopsy; Colonoscopy (2012); Colonoscopy (3/30/2011); hernia repair (Right); Cataract removal with implant (Right, 2020); Intracapsular cataract extraction (Right, 2020); Intracapsular cataract extraction (Left, 3/3/2020); Finger surgery (Right); Upper gastrointestinal endoscopy (N/A, 2022); and Colonoscopy (N/A, 2022).     Restrictions  Restrictions/Precautions  Restrictions/Precautions: Fall Risk (peripheral IV left antecubital)  Required Braces or Orthoses?: No  Implants present? : Metal implants (right shoulder surgery)  Position Activity Restriction  Other position/activity restrictions: up w/ assist      Vitals  Vitals  Pulse:  (67 supine; 78  seated; 80  standing)  BP:  (102/63 supine; 96/55 seated; 86/50 standing)  BP Location: Right upper arm  BP Method: Automatic  Patient Position: Supine;Sitting;Standing  MAP (Calculated): 76  Respirations: 18  SpO2: 95 %  O2 Device:

## 2023-09-16 ENCOUNTER — APPOINTMENT (OUTPATIENT)
Dept: ULTRASOUND IMAGING | Age: 78
DRG: 149 | End: 2023-09-16
Payer: MEDICARE

## 2023-09-16 VITALS
SYSTOLIC BLOOD PRESSURE: 127 MMHG | WEIGHT: 190 LBS | OXYGEN SATURATION: 92 % | TEMPERATURE: 98.6 F | BODY MASS INDEX: 26.6 KG/M2 | RESPIRATION RATE: 18 BRPM | DIASTOLIC BLOOD PRESSURE: 61 MMHG | HEIGHT: 71 IN | HEART RATE: 80 BPM

## 2023-09-16 LAB
ANION GAP SERPL CALCULATED.3IONS-SCNC: 9 MMOL/L (ref 9–17)
BASOPHILS # BLD: 0 K/UL (ref 0–0.2)
BASOPHILS NFR BLD: 1 % (ref 0–2)
BUN SERPL-MCNC: 29 MG/DL (ref 8–23)
CALCIUM SERPL-MCNC: 8.5 MG/DL (ref 8.6–10.4)
CHLORIDE SERPL-SCNC: 109 MMOL/L (ref 98–107)
CO2 SERPL-SCNC: 21 MMOL/L (ref 20–31)
CREAT SERPL-MCNC: 1.6 MG/DL (ref 0.7–1.2)
EOSINOPHIL # BLD: 0.1 K/UL (ref 0–0.4)
EOSINOPHILS RELATIVE PERCENT: 4 % (ref 0–4)
ERYTHROCYTE [DISTWIDTH] IN BLOOD BY AUTOMATED COUNT: 13.4 % (ref 11.5–14.9)
GFR SERPL CREATININE-BSD FRML MDRD: 44 ML/MIN/1.73M2
GLUCOSE SERPL-MCNC: 94 MG/DL (ref 70–99)
HCT VFR BLD AUTO: 31.8 % (ref 41–53)
HGB BLD-MCNC: 10.6 G/DL (ref 13.5–17.5)
LYMPHOCYTES NFR BLD: 0.5 K/UL (ref 1–4.8)
LYMPHOCYTES RELATIVE PERCENT: 16 % (ref 24–44)
MCH RBC QN AUTO: 31.1 PG (ref 26–34)
MCHC RBC AUTO-ENTMCNC: 33.5 G/DL (ref 31–37)
MCV RBC AUTO: 92.8 FL (ref 80–100)
MONOCYTES NFR BLD: 0.3 K/UL (ref 0.1–1.3)
MONOCYTES NFR BLD: 8 % (ref 1–7)
NEUTROPHILS NFR BLD: 71 % (ref 36–66)
NEUTS SEG NFR BLD: 2.5 K/UL (ref 1.3–9.1)
PLATELET # BLD AUTO: 98 K/UL (ref 150–450)
PMV BLD AUTO: 7.9 FL (ref 6–12)
POTASSIUM SERPL-SCNC: 4.6 MMOL/L (ref 3.7–5.3)
RBC # BLD AUTO: 3.42 M/UL (ref 4.5–5.9)
SODIUM SERPL-SCNC: 139 MMOL/L (ref 135–144)
WBC OTHER # BLD: 3.4 K/UL (ref 3.5–11)

## 2023-09-16 PROCEDURE — 80048 BASIC METABOLIC PNL TOTAL CA: CPT

## 2023-09-16 PROCEDURE — 85025 COMPLETE CBC W/AUTO DIFF WBC: CPT

## 2023-09-16 PROCEDURE — 2580000003 HC RX 258: Performed by: INTERNAL MEDICINE

## 2023-09-16 PROCEDURE — 99232 SBSQ HOSP IP/OBS MODERATE 35: CPT | Performed by: PSYCHIATRY & NEUROLOGY

## 2023-09-16 PROCEDURE — 2500000003 HC RX 250 WO HCPCS: Performed by: INTERNAL MEDICINE

## 2023-09-16 PROCEDURE — 36415 COLL VENOUS BLD VENIPUNCTURE: CPT

## 2023-09-16 PROCEDURE — 76770 US EXAM ABDO BACK WALL COMP: CPT

## 2023-09-16 PROCEDURE — 6370000000 HC RX 637 (ALT 250 FOR IP): Performed by: NURSE PRACTITIONER

## 2023-09-16 RX ADMIN — AMLODIPINE BESYLATE 10 MG: 10 TABLET ORAL at 08:54

## 2023-09-16 RX ADMIN — SODIUM BICARBONATE: 84 INJECTION, SOLUTION INTRAVENOUS at 08:56

## 2023-09-16 RX ADMIN — METOPROLOL SUCCINATE 50 MG: 50 TABLET, EXTENDED RELEASE ORAL at 08:54

## 2023-09-16 NOTE — PLAN OF CARE
Problem: Discharge Planning  Goal: Discharge to home or other facility with appropriate resources  9/16/2023 1601 by Sneha Yoon RN  Outcome: Completed  9/16/2023 1502 by Sneha Yoon RN  Outcome: Progressing  Flowsheets  Taken 9/16/2023 0857 by Sneha Yoon RN  Discharge to home or other facility with appropriate resources: Identify barriers to discharge with patient and caregiver  Taken 9/16/2023 0136 by Sunny Ellis RN  Discharge to home or other facility with appropriate resources:   Identify barriers to discharge with patient and caregiver   Arrange for needed discharge resources and transportation as appropriate   Identify discharge learning needs (meds, wound care, etc)   Refer to discharge planning if patient needs post-hospital services based on physician order or complex needs related to functional status, cognitive ability or social support system     Problem: Skin/Tissue Integrity  Goal: Absence of new skin breakdown  Description: 1. Monitor for areas of redness and/or skin breakdown  2. Assess vascular access sites hourly  3. Every 4-6 hours minimum:  Change oxygen saturation probe site  4. Every 4-6 hours:  If on nasal continuous positive airway pressure, respiratory therapy assess nares and determine need for appliance change or resting period.   9/16/2023 1601 by Sneha Yoon RN  Outcome: Completed  9/16/2023 1502 by Sneha Yoon RN  Outcome: Progressing     Problem: Safety - Adult  Goal: Free from fall injury  9/16/2023 1601 by Sneha Yoon RN  Outcome: Completed  9/16/2023 1502 by Sneha Yoon RN  Outcome: Progressing  Flowsheets (Taken 9/16/2023 0905)  Free From Fall Injury: Instruct family/caregiver on patient safety

## 2023-09-16 NOTE — DISCHARGE INSTRUCTIONS
Your information:  Name: Laine Wei  : 1945    Your instructions:    ***    What to do after you leave the hospital:    Recommended diet: regular diet    Recommended activity: activity as tolerated        The following personal items were collected during your admission and were returned to you:    Belongings  Dental Appliances: None  Vision - Corrective Lenses: Eyeglasses  Hearing Aid: None  Clothing: Jacket/Coat, Pants, Shirt, Socks  Jewelry: None  Body Piercings Removed: No  Electronic Devices: Cell Phone,   Weapons (Notify Protective Services/Security): None  Other Valuables: Wallet, [place]  Home Medications: Kept at bedside  Valuables Given To: Patient  Provide Name(s) of Who Valuable(s) Were Given To: pt    Information obtained by:  By signing below, I understand that if any problems occur once I leave the hospital I am to contact my PCP or go to the ER. I understand and acknowledge receipt of the instructions indicated above.

## 2023-09-16 NOTE — PROGRESS NOTES
Patient discharged from unit. Patient was provided discharge instructions and education prior to departure from unit. All personal belongings provided to patient before discharge. IV removed and all questions answered.

## 2023-09-16 NOTE — PROGRESS NOTES
[facility] Internal Medicine  Jasmina Cartwright MD; Geno Olivares MD; Edgar Keys MD; MD Arline Flanagan MD; Brenda Figueroa MD    [facility]   [address]    HISTORY AND PHYSICAL EXAMINATION            Date:   9/16/2023  Patient name:  Sacha Abdullahi  Date of admission:  9/14/2023  6:22 PM  MRN:   [id number]  Account:  [de-identified]  YOB: 1945  PCP:    Isela Chow MD  Room:   [facility]8545-91  Code Status:    Full Code    Chief Complaint:     Chief Complaint   Patient presents with    Dizziness    Nausea    Emesis       History Obtained From:     patient    History of Present Illness:     Sacha Abdullahi is a 76 y.o. Non- / non  male who presents with Dizziness, Nausea, and Emesis   and is admitted to the hospital for the management of Dizziness of unknown etiology. Patient with PMH significant for HTN, CKD stage III b, BPH, COPD, silicosis, anemia, gout and depression presented to ER with complaints of dizziness, nausea and vomiting that started about 5 hours before coming to ER. Patient was accompanied by his neighbor who was called by his daughter as she was concerned about the patient's fatigue and lethargy. He was also complaining of associated ringing sensation in his ears and lightheadedness which were worsening when he tried to stand up. He was not able to tolerate oral intake. He otherwise denied cough, chest pain, shortness of breath and palpitations. Patient says that he has been experiencing the symptoms for some time now but yesterday he was not able to find any relief. His home medications shows that he was taking meclizine 25 mg 3 times daily as needed at home but he is not sure exactly why and who started the medication. He was appearing fatigue, lethargic and only able to respond in 1-2 word sentences because of the persistent nausea.   He of unknown etiology - most likely secondary to BPPV ? precipitated by standing but no effect with head turn, orthostatics negative. CT head without contrast showed mild central and cortical cerebral atrophy with chronic deep white matter ischemic changes but no acute intracranial abnormalities. Echo shows normal LV systolic function with EF 55 to 60% and no wall motion abnormalities. Neurology consulted for further evaluation and work-up. Recurrent nausea and vomiting - associated with dizziness and lightheadedness, continued on IV fluids, as needed ondansetron and meclizine continued  Cholelithiasis without cholecystitis -CT abdomen pelvis without contrast shows cholelithiasis but no gallbladder duct dilation or obstruction. Lipase within range.   Essential HTN - currently well controlled with antihypertensives, continue Norvasc 10 mg and Toprol-XL 50 mg daily  CKD stage IIIb - creatinine at baseline 1.5-1.7  Benign prostatic hyperplasia - on Flomax 0.5 mg daily at home, currently held due to ongoing dizziness  Chronic gouty arthritis - currently stable off treatment  Chronic normocytic anemia - secondary to inflammatory arthritis, baseline hemoglobin 11-12  Thrombocytopenia due to hypersplenism - stable around 90,000 with no active bleeding or bruising  DVT prophylaxis - SCD as patient allergic to Lovenox and heparin    Disposition - Home once medically stable    9/16  Patient, clinically doing much better today  Walking comfortably to the bathroom  Had extensive discussion with patient and his daughter at bedside who lives in [place]  Patient will likely go with her for short period of time  MRI negative  Need to use meclizine as needed  If symptoms do not improve, patient will follow-up with his PCP and get referral to vestibular rehab as outpatient  Creatinine improving  Nausea vomiting improving  Has chronic thrombocytopenia  Outpatient work-up with PCP  No new complaints  Consultations:   MARSHA

## 2023-09-16 NOTE — PLAN OF CARE
Problem: Discharge Planning  Goal: Discharge to home or other facility with appropriate resources  9/16/2023 1502 by Attila Boss RN  Outcome: Progressing  Flowsheets (Taken 9/16/2023 0857)  Discharge to home or other facility with appropriate resources: Identify barriers to discharge with patient and caregiver  9/16/2023 0136 by Susanne Pierre RN  Outcome: Progressing  Flowsheets (Taken 9/16/2023 0136)  Discharge to home or other facility with appropriate resources:   Identify barriers to discharge with patient and caregiver   Arrange for needed discharge resources and transportation as appropriate   Identify discharge learning needs (meds, wound care, etc)   Refer to discharge planning if patient needs post-hospital services based on physician order or complex needs related to functional status, cognitive ability or social support system     Problem: Skin/Tissue Integrity  Goal: Absence of new skin breakdown  Description: 1. Monitor for areas of redness and/or skin breakdown  2. Assess vascular access sites hourly  3. Every 4-6 hours minimum:  Change oxygen saturation probe site  4. Every 4-6 hours:  If on nasal continuous positive airway pressure, respiratory therapy assess nares and determine need for appliance change or resting period.   9/16/2023 1502 by Attila Boss RN  Outcome: Progressing  9/16/2023 0136 by Susanne Pierre RN  Outcome: Progressing     Problem: Safety - Adult  Goal: Free from fall injury  9/16/2023 1502 by Attila Boss RN  Outcome: Progressing  Flowsheets (Taken 9/16/2023 0905)  Free From Fall Injury: Instruct family/caregiver on patient safety  9/16/2023 0136 by Susanne Pierre RN  Outcome: Progressing

## 2023-09-16 NOTE — PLAN OF CARE
Problem: Discharge Planning  Goal: Discharge to home or other facility with appropriate resources  9/16/2023 0136 by Mary Ceron RN  Outcome: Progressing  Flowsheets (Taken 9/16/2023 0136)  Discharge to home or other facility with appropriate resources:   Identify barriers to discharge with patient and caregiver   Arrange for needed discharge resources and transportation as appropriate   Identify discharge learning needs (meds, wound care, etc)   Refer to discharge planning if patient needs post-hospital services based on physician order or complex needs related to functional status, cognitive ability or social support system     Problem: Skin/Tissue Integrity  Goal: Absence of new skin breakdown  Description: 1. Monitor for areas of redness and/or skin breakdown  2. Assess vascular access sites hourly  3. Every 4-6 hours minimum:  Change oxygen saturation probe site  4. Every 4-6 hours:  If on nasal continuous positive airway pressure, respiratory therapy assess nares and determine need for appliance change or resting period.   9/16/2023 0136 by Mary Ceron RN  Outcome: Progressing     Problem: Safety - Adult  Goal: Free from fall injury  9/16/2023 0136 by Mary Ceron RN  Outcome: Progressing

## 2023-09-16 NOTE — PROGRESS NOTES
09/16/23 1252   Encounter Summary   Encounter Overview/Reason  Spiritual/Emotional Needs   Service Provided For: Patient and family together   Referral/Consult From: [address] [place] [address]   Last Encounter  09/16/23   Complexity of Encounter Low   Spiritual/Emotional needs   Type Spiritual Support   Assessment/Intervention/Outcome   Assessment Calm   Intervention Active listening;Sustaining Presence/Ministry of presence   Outcome Engaged in conversation;Expressed Gratitude

## 2023-09-17 NOTE — DISCHARGE SUMMARY
is normal in appearance. Multiple bilateral renal cysts. . The kidneys are otherwise normal in size and attenuation without evidence of hydronephrosis or renal calculi. GI/Bowel: The visualized bowel and mesentery show no mass lesions. Moderate colonic diverticulosis. No evidence of diverticulitis. No evidence of acute appendicitis. Small hiatal hernia Pelvis: Mild prostatic enlargement. Bladder and rectum are intact. Peritoneum/Retroperitoneum: No free fluid. Mild mesenteric panniculitis. No suspicious lymphadenopathy. No evidence of pneumoperitoneum. Bones/Soft Tissues: . The abdominal and pelvic walls are unremarkable. Degenerative changes seen in the visualized spine. Acquired spinal stenosis at L4-5. No acute bony abnormalities. Vascular calcifications are seen compatible with atherosclerotic disease. Cholelithiasis Splenomegaly No acute intra-abdominal intrapelvic abnormalities. Right basal reticulonodular densities and bibasal emphysematous changes. Small pericardial effusion. RECOMMENDATIONS: Consider follow-up CT chest for further evaluation of lower chest findings     XR CHEST PORTABLE    Result Date: 9/14/2023  EXAMINATION: ONE XRAY VIEW OF THE CHEST 9/14/2023 4:03 pm COMPARISON: 12/21/2022 HISTORY: ORDERING SYSTEM PROVIDED HISTORY: chest pain workup TECHNOLOGIST PROVIDED HISTORY: chest pain workup Reason for Exam: Dizziness, nausea FINDINGS: Cardiac size is enlarged. Stable patchy areas of parenchymal fibrosis. No acute infiltrates are seen . The pulmonary vascularity is stable. No pneumothorax. No pleural effusions identified. .     Stable chronic changes. .         Consultations:    Consults:     Final Specialist Recommendations/Findings:   IP CONSULT TO HOSPITALIST  IP CONSULT TO NEUROLOGY  IP CONSULT TO NEPHROLOGY      The patient was seen and examined on day of discharge and this discharge summary is in conjunction with any daily progress note from day of discharge.     Discharge plan:

## 2023-09-18 ENCOUNTER — TELEPHONE (OUTPATIENT)
Dept: INTERNAL MEDICINE CLINIC | Age: 78
End: 2023-09-18

## 2023-09-18 NOTE — TELEPHONE ENCOUNTER
Care Transitions Initial Follow Up Call    Outreach made within 2 business days of discharge: Yes    Patient: Arie Arnold Patient : 1945   MRN: [id number]  Reason for Admission: There are no discharge diagnoses documented for the most recent discharge. Discharge Date: 23       Spoke with: Patients daughter    Discharge department/facility: [address] [place] [address] Interactive Patient Contact:  Was patient able to fill all prescriptions: Yes  Was patient instructed to bring all medications to the follow-up visit: Yes  Is patient taking all medications as directed in the discharge summary?  Yes  Does patient understand their discharge instructions: Yes  Does patient have questions or concerns that need addressed prior to 7-14 day follow up office visit: no    Scheduled appointment with PCP within 7-14 days    Follow Up  Future Appointments   Date Time Provider [address] [place] [address]   2023  9:00 AM Ebony Bettencourt MD [address] [place] [address]   2023 10:00 AM Shruthi Smallwood MD [address] [place] [address]

## 2023-09-21 LAB
ANA SER QL IA: POSITIVE
DSDNA IGG SER QL IA: <0.5 IU/ML
NUCLEAR IGG SER IA-RTO: 1.2 U/ML

## 2023-09-22 LAB
DEPRECATED S PNEUM5 IGG SER-MCNC: <0.4 U/ML
ENA JO1 IGG SER-ACNC: <0.4 U/ML
ENA SCL70 AB SER IA-ACNC: <0.6 U/ML
ENA SM AB SER-ACNC: 7.5 U/ML
ENA SS-A IGG SER QL: <0.3 U/ML
ENA SS-B IGG SER IA-ACNC: <0.3 U/ML
U1 SNRNP IGG SER IA-ACNC: 7.5 U/ML
U1 SNRNP IGG SER IA-ACNC: <0.3 U/ML

## 2023-09-27 LAB
EKG ATRIAL RATE: 86 BPM
EKG P AXIS: 42 DEGREES
EKG P-R INTERVAL: 194 MS
EKG Q-T INTERVAL: 374 MS
EKG QRS DURATION: 98 MS
EKG QTC CALCULATION (BAZETT): 447 MS
EKG R AXIS: 43 DEGREES
EKG T AXIS: 40 DEGREES
EKG VENTRICULAR RATE: 86 BPM

## 2023-10-09 SDOH — HEALTH STABILITY: PHYSICAL HEALTH: ON AVERAGE, HOW MANY DAYS PER WEEK DO YOU ENGAGE IN MODERATE TO STRENUOUS EXERCISE (LIKE A BRISK WALK)?: 3 DAYS

## 2023-10-09 SDOH — HEALTH STABILITY: PHYSICAL HEALTH: ON AVERAGE, HOW MANY MINUTES DO YOU ENGAGE IN EXERCISE AT THIS LEVEL?: 20 MIN

## 2023-10-11 ENCOUNTER — OFFICE VISIT (OUTPATIENT)
Dept: INTERNAL MEDICINE CLINIC | Age: 78
End: 2023-10-11
Payer: MEDICARE

## 2023-10-11 VITALS
SYSTOLIC BLOOD PRESSURE: 84 MMHG | BODY MASS INDEX: 27.52 KG/M2 | DIASTOLIC BLOOD PRESSURE: 42 MMHG | HEART RATE: 67 BPM | WEIGHT: 192.2 LBS | OXYGEN SATURATION: 96 % | HEIGHT: 70 IN

## 2023-10-11 DIAGNOSIS — R42 DIZZINESS: ICD-10-CM

## 2023-10-11 DIAGNOSIS — D69.6 THROMBOCYTOPENIA (HCC): ICD-10-CM

## 2023-10-11 DIAGNOSIS — N17.9 ACUTE RENAL FAILURE, UNSPECIFIED ACUTE RENAL FAILURE TYPE (HCC): ICD-10-CM

## 2023-10-11 DIAGNOSIS — M00.9 PYOGENIC ARTHRITIS OF KNEE, DUE TO UNSPECIFIED ORGANISM, UNSPECIFIED LATERALITY (HCC): Primary | ICD-10-CM

## 2023-10-11 PROCEDURE — 1123F ACP DISCUSS/DSCN MKR DOCD: CPT | Performed by: INTERNAL MEDICINE

## 2023-10-11 PROCEDURE — G8427 DOCREV CUR MEDS BY ELIG CLIN: HCPCS | Performed by: INTERNAL MEDICINE

## 2023-10-11 PROCEDURE — 3078F DIAST BP <80 MM HG: CPT | Performed by: INTERNAL MEDICINE

## 2023-10-11 PROCEDURE — 3074F SYST BP LT 130 MM HG: CPT | Performed by: INTERNAL MEDICINE

## 2023-10-11 PROCEDURE — G8484 FLU IMMUNIZE NO ADMIN: HCPCS | Performed by: INTERNAL MEDICINE

## 2023-10-11 PROCEDURE — 99214 OFFICE O/P EST MOD 30 MIN: CPT | Performed by: INTERNAL MEDICINE

## 2023-10-11 PROCEDURE — 1036F TOBACCO NON-USER: CPT | Performed by: INTERNAL MEDICINE

## 2023-10-11 PROCEDURE — 1111F DSCHRG MED/CURRENT MED MERGE: CPT | Performed by: INTERNAL MEDICINE

## 2023-10-11 PROCEDURE — G8417 CALC BMI ABV UP PARAM F/U: HCPCS | Performed by: INTERNAL MEDICINE

## 2023-10-11 SDOH — ECONOMIC STABILITY: HOUSING INSECURITY
IN THE LAST 12 MONTHS, WAS THERE A TIME WHEN YOU DID NOT HAVE A STEADY PLACE TO SLEEP OR SLEPT IN A SHELTER (INCLUDING NOW)?: NO

## 2023-10-11 SDOH — ECONOMIC STABILITY: INCOME INSECURITY: HOW HARD IS IT FOR YOU TO PAY FOR THE VERY BASICS LIKE FOOD, HOUSING, MEDICAL CARE, AND HEATING?: NOT HARD AT ALL

## 2023-10-11 SDOH — ECONOMIC STABILITY: FOOD INSECURITY: WITHIN THE PAST 12 MONTHS, YOU WORRIED THAT YOUR FOOD WOULD RUN OUT BEFORE YOU GOT MONEY TO BUY MORE.: NEVER TRUE

## 2023-10-11 SDOH — ECONOMIC STABILITY: FOOD INSECURITY: WITHIN THE PAST 12 MONTHS, THE FOOD YOU BOUGHT JUST DIDN'T LAST AND YOU DIDN'T HAVE MONEY TO GET MORE.: NEVER TRUE

## 2023-10-11 NOTE — PROGRESS NOTES
Subjective:      Patient ID: Erik Combs is a 78 y.o. male. HPI-patient is here for evaluation of multiple medical problems. He was recently admitted in the hospital with dizziness, nausea, patient was evaluated extensively, orthostatic blood pressure was negative, echo, MRI brain was done which was negative for stroke, patient was also evaluated by neurologist, concern was BPPV  Patient has hypertension, CKD, thrombocytopenia  Patient has appointment coming with nephrologist recently  He has history of alcoholism  Patient, feels that his symptoms improved but still feel dizzy especially when he change position  Blood pressure in the office was 84/42    Review of Systems   Constitutional:  Negative for activity change, appetite change, chills and diaphoresis. HENT:  Negative for congestion, dental problem, ear discharge, facial swelling and hearing loss. Respiratory:  Negative for apnea, cough, chest tightness, shortness of breath and wheezing. Cardiovascular:  Negative for chest pain and leg swelling. Gastrointestinal:  Negative for abdominal distention, abdominal pain, constipation and diarrhea. Genitourinary:  Negative for difficulty urinating, dysuria, enuresis, flank pain and frequency. Musculoskeletal:  Negative for arthralgias, back pain, gait problem and joint swelling. Skin:  Negative for color change, pallor and rash. Neurological:  Positive for dizziness (especially with Changing Position). Negative for seizures, facial asymmetry, light-headedness, numbness and headaches. Psychiatric/Behavioral:  Negative for agitation, behavioral problems, confusion, decreased concentration and dysphoric mood. Objective:   Physical Exam  Vitals and nursing note reviewed. Constitutional:       General: He is not in acute distress. Appearance: He is well-developed. He is not diaphoretic. HENT:      Head: Normocephalic and atraumatic.       Mouth/Throat:      Pharynx: No

## 2023-10-12 ASSESSMENT — ENCOUNTER SYMPTOMS
APNEA: 0
ABDOMINAL PAIN: 0
FACIAL SWELLING: 0
COLOR CHANGE: 0
WHEEZING: 0
CONSTIPATION: 0
COUGH: 0
CHEST TIGHTNESS: 0
ABDOMINAL DISTENTION: 0
BACK PAIN: 0
SHORTNESS OF BREATH: 0
DIARRHEA: 0

## 2023-11-22 ENCOUNTER — OFFICE VISIT (OUTPATIENT)
Dept: INTERNAL MEDICINE CLINIC | Age: 78
End: 2023-11-22
Payer: MEDICARE

## 2023-11-22 VITALS
OXYGEN SATURATION: 100 % | WEIGHT: 187 LBS | SYSTOLIC BLOOD PRESSURE: 124 MMHG | BODY MASS INDEX: 26.77 KG/M2 | DIASTOLIC BLOOD PRESSURE: 62 MMHG | HEART RATE: 78 BPM | TEMPERATURE: 99.4 F | HEIGHT: 70 IN

## 2023-11-22 DIAGNOSIS — R26.9 GAIT ABNORMALITY: ICD-10-CM

## 2023-11-22 DIAGNOSIS — R11.2 NAUSEA AND VOMITING, UNSPECIFIED VOMITING TYPE: Primary | ICD-10-CM

## 2023-11-22 DIAGNOSIS — H61.23 BILATERAL IMPACTED CERUMEN: ICD-10-CM

## 2023-11-22 DIAGNOSIS — Z00.00 INITIAL MEDICARE ANNUAL WELLNESS VISIT: ICD-10-CM

## 2023-11-22 LAB
INFLUENZA A ANTIBODY: NEGATIVE
INFLUENZA B ANTIBODY: NEGATIVE

## 2023-11-22 PROCEDURE — 3074F SYST BP LT 130 MM HG: CPT | Performed by: INTERNAL MEDICINE

## 2023-11-22 PROCEDURE — 3078F DIAST BP <80 MM HG: CPT | Performed by: INTERNAL MEDICINE

## 2023-11-22 PROCEDURE — G0438 PPPS, INITIAL VISIT: HCPCS | Performed by: INTERNAL MEDICINE

## 2023-11-22 PROCEDURE — 1123F ACP DISCUSS/DSCN MKR DOCD: CPT | Performed by: INTERNAL MEDICINE

## 2023-11-22 PROCEDURE — G8484 FLU IMMUNIZE NO ADMIN: HCPCS | Performed by: INTERNAL MEDICINE

## 2023-11-22 PROCEDURE — 87804 INFLUENZA ASSAY W/OPTIC: CPT | Performed by: INTERNAL MEDICINE

## 2023-11-22 RX ORDER — ALLOPURINOL 100 MG/1
TABLET ORAL
COMMUNITY
Start: 2023-10-12

## 2023-11-22 RX ORDER — COLCHICINE 0.6 MG/1
TABLET ORAL
COMMUNITY
Start: 2023-10-12

## 2023-11-22 ASSESSMENT — PATIENT HEALTH QUESTIONNAIRE - PHQ9
1. LITTLE INTEREST OR PLEASURE IN DOING THINGS: 0
SUM OF ALL RESPONSES TO PHQ QUESTIONS 1-9: 0
SUM OF ALL RESPONSES TO PHQ QUESTIONS 1-9: 0
SUM OF ALL RESPONSES TO PHQ9 QUESTIONS 1 & 2: 0
SUM OF ALL RESPONSES TO PHQ QUESTIONS 1-9: 0
2. FEELING DOWN, DEPRESSED OR HOPELESS: 0
SUM OF ALL RESPONSES TO PHQ QUESTIONS 1-9: 0

## 2023-12-01 ENCOUNTER — HOSPITAL ENCOUNTER (OUTPATIENT)
Age: 78
Discharge: HOME | End: 2023-12-01
Payer: MEDICARE

## 2023-12-01 DIAGNOSIS — D69.6 THROMBOCYTOPENIA (HCC): ICD-10-CM

## 2023-12-01 LAB
ALBUMIN SERPL-MCNC: 3.5 G/DL (ref 3.5–5.2)
ALP SERPL-CCNC: 127 U/L (ref 40–129)
ALT SERPL-CCNC: 20 U/L (ref 5–41)
ANION GAP SERPL CALCULATED.3IONS-SCNC: 8 MMOL/L (ref 9–17)
AST SERPL-CCNC: 27 U/L
BASOPHILS # BLD: 0.04 K/UL (ref 0–0.2)
BASOPHILS NFR BLD: 1 % (ref 0–2)
BILIRUB SERPL-MCNC: 0.3 MG/DL (ref 0.3–1.2)
BUN SERPL-MCNC: 41 MG/DL (ref 8–23)
CALCIUM SERPL-MCNC: 9 MG/DL (ref 8.6–10.4)
CHLORIDE SERPL-SCNC: 111 MMOL/L (ref 98–107)
CO2 SERPL-SCNC: 21 MMOL/L (ref 20–31)
CREAT SERPL-MCNC: 1.7 MG/DL (ref 0.7–1.2)
EOSINOPHIL # BLD: 0.22 K/UL (ref 0–0.4)
EOSINOPHILS RELATIVE PERCENT: 6 % (ref 0–4)
ERYTHROCYTE [DISTWIDTH] IN BLOOD BY AUTOMATED COUNT: 15.7 % (ref 11.5–14.9)
FERRITIN SERPL-MCNC: 502 NG/ML (ref 30–400)
GFR SERPL CREATININE-BSD FRML MDRD: 40 ML/MIN/1.73M2
GLUCOSE SERPL-MCNC: 127 MG/DL (ref 70–99)
HCT VFR BLD AUTO: 35.8 % (ref 41–53)
HGB BLD-MCNC: 11.4 G/DL (ref 13.5–17.5)
LYMPHOCYTES NFR BLD: 0.59 K/UL (ref 1–4.8)
LYMPHOCYTES RELATIVE PERCENT: 16 % (ref 24–44)
MCH RBC QN AUTO: 29.8 PG (ref 26–34)
MCHC RBC AUTO-ENTMCNC: 31.9 G/DL (ref 31–37)
MCV RBC AUTO: 93.4 FL (ref 80–100)
MONOCYTES NFR BLD: 0.3 K/UL (ref 0.1–1.3)
MONOCYTES NFR BLD: 8 % (ref 1–7)
MORPHOLOGY: NORMAL
NEUTROPHILS NFR BLD: 69 % (ref 36–66)
NEUTS SEG NFR BLD: 2.55 K/UL (ref 1.3–9.1)
PLATELET # BLD AUTO: 84 K/UL (ref 150–450)
PMV BLD AUTO: 9.2 FL (ref 6–12)
POTASSIUM SERPL-SCNC: 5.2 MMOL/L (ref 3.7–5.3)
PROT SERPL-MCNC: 6.7 G/DL (ref 6.4–8.3)
RBC # BLD AUTO: 3.83 M/UL (ref 4.5–5.9)
SODIUM SERPL-SCNC: 140 MMOL/L (ref 135–144)
URATE SERPL-MCNC: 5.7 MG/DL (ref 3.4–7)
WBC OTHER # BLD: 3.7 K/UL (ref 3.5–11)

## 2023-12-01 PROCEDURE — 82728 ASSAY OF FERRITIN: CPT

## 2023-12-01 PROCEDURE — 36415 COLL VENOUS BLD VENIPUNCTURE: CPT

## 2023-12-01 PROCEDURE — 80053 COMPREHEN METABOLIC PANEL: CPT

## 2023-12-01 PROCEDURE — 85025 COMPLETE CBC W/AUTO DIFF WBC: CPT

## 2023-12-01 PROCEDURE — 84550 ASSAY OF BLOOD/URIC ACID: CPT

## 2023-12-06 ENCOUNTER — HOSPITAL ENCOUNTER (OUTPATIENT)
Dept: PHYSICAL THERAPY | Age: 78
Setting detail: THERAPIES SERIES
Discharge: HOME | End: 2023-12-06
Payer: MEDICARE

## 2023-12-06 PROCEDURE — 97162 PT EVAL MOD COMPLEX 30 MIN: CPT

## 2023-12-06 PROCEDURE — 97110 THERAPEUTIC EXERCISES: CPT

## 2023-12-06 NOTE — CONSULTS
[x] [facility] ([facility]) - [facility]  [address]  [place]: [phone]   F: [phone]    Physical Therapy Lower Extremity Evaluation    Date:  2023  Patient: Tessa Every [facility] : 1945  MRN: [id number]  Physician: Jens Interiano MD     Insurance: Medicare medical necessity/BCBS secondary eval, then Jose Man;  Medical Diagnosis: gait abnormality R26.9;   Rehab Codes: LE weakness M62.81  Onset date: referral 23  Next Dr's appt.: PRN  Visit Count:    Cancel/No Show: 0/0    Subjective: Patient with complaints of limited leg strength and balance that has occurred insidiously and has gotten worse in the last 6 months per the patient. Patient went to family physician for regular scheduled check up and expressed concern over weakness in whole body- particularly in the LEs and with balance limitations. Patient lives alone without family support and so must be (I) with ambulation, stairs (2 stairs into home and 13 stairs to get to basement to do laundry weekly), and all other function including going to store, kneeling/squatting to plant/tend garden. All of these have gotten more difficult and prompted MD appointment and referral to therapy. Was limited with balance testing, full TUG testing, and 5x sit to stand testing today with clinical testing.   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

## 2023-12-12 ENCOUNTER — TELEPHONE (OUTPATIENT)
Dept: ONCOLOGY | Age: 78
End: 2023-12-12

## 2023-12-12 ENCOUNTER — HOSPITAL ENCOUNTER (OUTPATIENT)
Dept: GENERAL RADIOLOGY | Age: 78
Discharge: HOME | End: 2023-12-14
Payer: MEDICARE

## 2023-12-12 ENCOUNTER — OFFICE VISIT (OUTPATIENT)
Dept: ONCOLOGY | Age: 78
End: 2023-12-12
Payer: MEDICARE

## 2023-12-12 ENCOUNTER — HOSPITAL ENCOUNTER (OUTPATIENT)
Age: 78
Discharge: HOME | End: 2023-12-14
Payer: MEDICARE

## 2023-12-12 VITALS
HEART RATE: 82 BPM | RESPIRATION RATE: 18 BRPM | BODY MASS INDEX: 27.95 KG/M2 | OXYGEN SATURATION: 95 % | SYSTOLIC BLOOD PRESSURE: 153 MMHG | DIASTOLIC BLOOD PRESSURE: 75 MMHG | WEIGHT: 194.8 LBS | TEMPERATURE: 96.8 F

## 2023-12-12 DIAGNOSIS — D69.6 THROMBOCYTOPENIA (HCC): Primary | ICD-10-CM

## 2023-12-12 DIAGNOSIS — Z87.39 HX OF GOUT: ICD-10-CM

## 2023-12-12 DIAGNOSIS — J84.89 BOOP (BRONCHIOLITIS OBLITERANS WITH ORGANIZING PNEUMONIA) (HCC): ICD-10-CM

## 2023-12-12 DIAGNOSIS — R79.89 ELEVATED FERRITIN: ICD-10-CM

## 2023-12-12 PROCEDURE — G8484 FLU IMMUNIZE NO ADMIN: HCPCS | Performed by: INTERNAL MEDICINE

## 2023-12-12 PROCEDURE — G8427 DOCREV CUR MEDS BY ELIG CLIN: HCPCS | Performed by: INTERNAL MEDICINE

## 2023-12-12 PROCEDURE — 71046 X-RAY EXAM CHEST 2 VIEWS: CPT

## 2023-12-12 PROCEDURE — 99211 OFF/OP EST MAY X REQ PHY/QHP: CPT | Performed by: INTERNAL MEDICINE

## 2023-12-12 PROCEDURE — G8417 CALC BMI ABV UP PARAM F/U: HCPCS | Performed by: INTERNAL MEDICINE

## 2023-12-12 PROCEDURE — 1036F TOBACCO NON-USER: CPT | Performed by: INTERNAL MEDICINE

## 2023-12-12 PROCEDURE — 99214 OFFICE O/P EST MOD 30 MIN: CPT | Performed by: INTERNAL MEDICINE

## 2023-12-12 PROCEDURE — 1123F ACP DISCUSS/DSCN MKR DOCD: CPT | Performed by: INTERNAL MEDICINE

## 2023-12-12 PROCEDURE — 3077F SYST BP >= 140 MM HG: CPT | Performed by: INTERNAL MEDICINE

## 2023-12-12 PROCEDURE — 3078F DIAST BP <80 MM HG: CPT | Performed by: INTERNAL MEDICINE

## 2023-12-12 RX ORDER — PROBENECID 500 MG/1
TABLET, FILM COATED ORAL
COMMUNITY

## 2023-12-12 RX ORDER — ATENOLOL 50 MG/1
TABLET ORAL
COMMUNITY
Start: 1900-01-01

## 2023-12-12 RX ORDER — TAMSULOSIN HYDROCHLORIDE 0.4 MG/1
0.4 CAPSULE ORAL DAILY
COMMUNITY

## 2023-12-12 RX ORDER — AMLODIPINE BESYLATE 10 MG/1
1 TABLET ORAL
COMMUNITY
Start: 1900-01-01

## 2023-12-12 NOTE — TELEPHONE ENCOUNTER
Rv in 1 year.  Need cbc and ferritin prior to RV         Pt will have labs(cbc, ferritin) drawn one week prior to RV    Rv scheduled for 12/3/24 @ 10:15am    PT was given AVS and appt schedule    Electronically signed by James Mims on 12/12/2023 at 10:10 AM

## 2023-12-12 NOTE — PROGRESS NOTES
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 But most of the elevation is related to acute inflammatory arthritis. Anemia and thrombocytopenia. Concerning features - due to splenomegaly. Gouty arthritis with multiple subcutaneous nodules    CURRENT THERAPY:  Received therapeutic phlebotomy when the ferritin was above 1000 but ferritin improved significantly after control of arthritis  Started on Probenicid     BRIEF CASE HISTORY:  Stefano Lovett is a very pleasant 78 y.o. male who is referred to us for elevated ferritin. He has history of CKD, stable. He has history of gout, gouty arthritis. He consumes alcohol rarely. CBC also showed anemia and thrombocytopenia. Follow up testing showed recovery of his hemoglobin but his platelets remain low. Work up done 01/29/2018 showed continued elevated ferritin and heterozygous mutation for H63D, his uric acid was elevated. He was started Probenecid. He was started on phlebotomy but things got much better with control of his gouty arthritis. INTERIM HISTORY: The patient presents to review for elevated ferritin and to review lab work. His gout remains active, he was seen by rheumatologist who started him on probenecid. He feels about the same. In September/2023, he was admitted to the hospital with dizziness. Extensive workup including brain MRI was done and was essentially negative. His condition improved with time and he was discharged. I am not sure if he has some form of labyrinthitis or viral gastroenteritis that led to dehydration. He is feeling much better now and the dizziness have subsided. He remains active without any limitation. His ferritin is slowly dropping and is in the 4-500 range instead of 1300 at baseline. His platelets are running between 80 and 100,000 and they have been stable.       PAST MEDICAL HISTORY: has a past medical history of Acute renal failure ([address]), Anemia,

## 2024-02-03 ENCOUNTER — HOSPITAL ENCOUNTER (OUTPATIENT)
Age: 79
End: 2024-02-03
Payer: MEDICARE

## 2024-02-03 ENCOUNTER — HOSPITAL ENCOUNTER (OUTPATIENT)
Dept: GENERAL RADIOLOGY | Age: 79
End: 2024-02-03
Payer: MEDICARE

## 2024-02-03 DIAGNOSIS — J84.89 BOOP (BRONCHIOLITIS OBLITERANS WITH ORGANIZING PNEUMONIA) (HCC): ICD-10-CM

## 2024-02-03 PROCEDURE — 71046 X-RAY EXAM CHEST 2 VIEWS: CPT

## 2024-03-11 ASSESSMENT — PATIENT HEALTH QUESTIONNAIRE - PHQ9
2. FEELING DOWN, DEPRESSED OR HOPELESS: 0
1. LITTLE INTEREST OR PLEASURE IN DOING THINGS: 0
2. FEELING DOWN, DEPRESSED OR HOPELESS: NOT AT ALL
SUM OF ALL RESPONSES TO PHQ QUESTIONS 1-9: 0
SUM OF ALL RESPONSES TO PHQ9 QUESTIONS 1 & 2: 0
1. LITTLE INTEREST OR PLEASURE IN DOING THINGS: NOT AT ALL
SUM OF ALL RESPONSES TO PHQ QUESTIONS 1-9: 0
SUM OF ALL RESPONSES TO PHQ9 QUESTIONS 1 & 2: 0

## 2024-03-12 ENCOUNTER — OFFICE VISIT (OUTPATIENT)
Dept: INTERNAL MEDICINE CLINIC | Age: 79
End: 2024-03-12
Payer: MEDICARE

## 2024-03-12 VITALS
DIASTOLIC BLOOD PRESSURE: 82 MMHG | BODY MASS INDEX: 26.74 KG/M2 | OXYGEN SATURATION: 99 % | WEIGHT: 191 LBS | HEART RATE: 76 BPM | HEIGHT: 71 IN | SYSTOLIC BLOOD PRESSURE: 136 MMHG

## 2024-03-12 DIAGNOSIS — N18.32 STAGE 3B CHRONIC KIDNEY DISEASE (HCC): ICD-10-CM

## 2024-03-12 DIAGNOSIS — R42 DIZZINESS: ICD-10-CM

## 2024-03-12 DIAGNOSIS — M00.9 PYOGENIC ARTHRITIS OF KNEE, DUE TO UNSPECIFIED ORGANISM, UNSPECIFIED LATERALITY (HCC): ICD-10-CM

## 2024-03-12 DIAGNOSIS — T78.40XS ALLERGIC REACTION, SEQUELA: ICD-10-CM

## 2024-03-12 DIAGNOSIS — D69.6 THROMBOCYTOPENIA (HCC): ICD-10-CM

## 2024-03-12 DIAGNOSIS — N18.31 STAGE 3A CHRONIC KIDNEY DISEASE (HCC): ICD-10-CM

## 2024-03-12 DIAGNOSIS — I10 ESSENTIAL HYPERTENSION: ICD-10-CM

## 2024-03-12 DIAGNOSIS — N17.9 ACUTE RENAL FAILURE, UNSPECIFIED ACUTE RENAL FAILURE TYPE (HCC): Primary | ICD-10-CM

## 2024-03-12 PROCEDURE — 3075F SYST BP GE 130 - 139MM HG: CPT | Performed by: INTERNAL MEDICINE

## 2024-03-12 PROCEDURE — G8417 CALC BMI ABV UP PARAM F/U: HCPCS | Performed by: INTERNAL MEDICINE

## 2024-03-12 PROCEDURE — 1036F TOBACCO NON-USER: CPT | Performed by: INTERNAL MEDICINE

## 2024-03-12 PROCEDURE — 3079F DIAST BP 80-89 MM HG: CPT | Performed by: INTERNAL MEDICINE

## 2024-03-12 PROCEDURE — G8427 DOCREV CUR MEDS BY ELIG CLIN: HCPCS | Performed by: INTERNAL MEDICINE

## 2024-03-12 PROCEDURE — 1123F ACP DISCUSS/DSCN MKR DOCD: CPT | Performed by: INTERNAL MEDICINE

## 2024-03-12 PROCEDURE — G8484 FLU IMMUNIZE NO ADMIN: HCPCS | Performed by: INTERNAL MEDICINE

## 2024-03-12 PROCEDURE — 99214 OFFICE O/P EST MOD 30 MIN: CPT | Performed by: INTERNAL MEDICINE

## 2024-03-12 RX ORDER — DIAPER,BRIEF,INFANT-TODD,DISP
EACH MISCELLANEOUS
Qty: 1 EACH | Refills: 0 | Status: SHIPPED | OUTPATIENT
Start: 2024-03-12

## 2024-03-12 RX ORDER — MECLIZINE HYDROCHLORIDE 25 MG/1
25 TABLET ORAL 3 TIMES DAILY PRN
Qty: 30 TABLET | Refills: 0 | Status: SHIPPED | OUTPATIENT
Start: 2024-03-12 | End: 2024-03-22

## 2024-03-12 RX ORDER — METOPROLOL SUCCINATE 50 MG/1
50 TABLET, EXTENDED RELEASE ORAL DAILY
Qty: 90 TABLET | Refills: 3 | Status: SHIPPED | OUTPATIENT
Start: 2024-03-12

## 2024-03-12 ASSESSMENT — ENCOUNTER SYMPTOMS
ABDOMINAL DISTENTION: 0
WHEEZING: 0
CONSTIPATION: 0
SHORTNESS OF BREATH: 0
FACIAL SWELLING: 0
ABDOMINAL PAIN: 0
COUGH: 0
APNEA: 0
BACK PAIN: 0
DIARRHEA: 0
COLOR CHANGE: 0
CHEST TIGHTNESS: 0

## 2024-03-12 NOTE — PROGRESS NOTES
Subjective:      Patient ID: Gage Cook is a 78 y.o. male.    HPI-patient is here for evaluation multimedical problem.  He has hypertension, chronic, on allopurinol, BPH, history of dizziness  Patient told me that his dose of allopurinol was increased from 100 mg to 400 mg by his rheumatologist  Ever since, he has noticed a rash affecting, all his body rashes painless, , rashes is erythematous no drainage from the records  No fever, no difficulty in swallowing, breathing  Review of Systems   Constitutional:  Negative for activity change, appetite change, chills and diaphoresis.   HENT:  Negative for congestion, dental problem, ear discharge, facial swelling and hearing loss.    Respiratory:  Negative for apnea, cough, chest tightness, shortness of breath and wheezing.    Cardiovascular:  Negative for chest pain and leg swelling.   Gastrointestinal:  Negative for abdominal distention, abdominal pain, constipation and diarrhea.   Genitourinary:  Negative for difficulty urinating, dysuria, enuresis, flank pain and frequency.   Musculoskeletal:  Positive for arthralgias (Both shoulders). Negative for back pain, gait problem and joint swelling.   Skin:  Positive for rash (Affecting both legs, both arms). Negative for color change and pallor.   Neurological:  Positive for dizziness (especially with Changing Position). Negative for seizures, facial asymmetry, light-headedness, numbness and headaches.   Psychiatric/Behavioral:  Negative for agitation, behavioral problems, confusion, decreased concentration and dysphoric mood.        Objective:   Physical Exam  Vitals and nursing note reviewed.   Constitutional:       General: He is not in acute distress.     Appearance: He is well-developed. He is not diaphoretic.   HENT:      Head: Normocephalic and atraumatic.      Mouth/Throat:      Pharynx: No oropharyngeal exudate.   Eyes:      General: No scleral icterus.        Right eye: No discharge.         Left

## 2024-04-16 DIAGNOSIS — I10 ESSENTIAL HYPERTENSION: ICD-10-CM

## 2024-04-16 RX ORDER — METOPROLOL SUCCINATE 50 MG/1
50 TABLET, EXTENDED RELEASE ORAL DAILY
Qty: 90 TABLET | Refills: 3 | Status: SHIPPED | OUTPATIENT
Start: 2024-04-16

## 2024-05-09 ENCOUNTER — OFFICE VISIT (OUTPATIENT)
Dept: INTERNAL MEDICINE CLINIC | Age: 79
End: 2024-05-09
Payer: MEDICARE

## 2024-05-09 VITALS
HEART RATE: 78 BPM | OXYGEN SATURATION: 98 % | SYSTOLIC BLOOD PRESSURE: 138 MMHG | BODY MASS INDEX: 26.46 KG/M2 | DIASTOLIC BLOOD PRESSURE: 82 MMHG | HEIGHT: 71 IN | WEIGHT: 189 LBS

## 2024-05-09 DIAGNOSIS — N18.31 STAGE 3A CHRONIC KIDNEY DISEASE (HCC): ICD-10-CM

## 2024-05-09 DIAGNOSIS — D69.6 THROMBOCYTOPENIA (HCC): ICD-10-CM

## 2024-05-09 DIAGNOSIS — I10 ESSENTIAL HYPERTENSION: ICD-10-CM

## 2024-05-09 DIAGNOSIS — N17.9 ACUTE RENAL FAILURE, UNSPECIFIED ACUTE RENAL FAILURE TYPE (HCC): Primary | ICD-10-CM

## 2024-05-09 PROCEDURE — 1036F TOBACCO NON-USER: CPT | Performed by: INTERNAL MEDICINE

## 2024-05-09 PROCEDURE — G8417 CALC BMI ABV UP PARAM F/U: HCPCS | Performed by: INTERNAL MEDICINE

## 2024-05-09 PROCEDURE — 3075F SYST BP GE 130 - 139MM HG: CPT | Performed by: INTERNAL MEDICINE

## 2024-05-09 PROCEDURE — 3079F DIAST BP 80-89 MM HG: CPT | Performed by: INTERNAL MEDICINE

## 2024-05-09 PROCEDURE — 99214 OFFICE O/P EST MOD 30 MIN: CPT | Performed by: INTERNAL MEDICINE

## 2024-05-09 PROCEDURE — G8427 DOCREV CUR MEDS BY ELIG CLIN: HCPCS | Performed by: INTERNAL MEDICINE

## 2024-05-09 PROCEDURE — 1123F ACP DISCUSS/DSCN MKR DOCD: CPT | Performed by: INTERNAL MEDICINE

## 2024-05-09 RX ORDER — FEBUXOSTAT 40 MG/1
40 TABLET, FILM COATED ORAL DAILY
COMMUNITY
Start: 2024-03-22

## 2024-05-09 SDOH — ECONOMIC STABILITY: HOUSING INSECURITY
IN THE LAST 12 MONTHS, WAS THERE A TIME WHEN YOU DID NOT HAVE A STEADY PLACE TO SLEEP OR SLEPT IN A SHELTER (INCLUDING NOW)?: PATIENT DECLINED

## 2024-05-09 SDOH — ECONOMIC STABILITY: FOOD INSECURITY: WITHIN THE PAST 12 MONTHS, YOU WORRIED THAT YOUR FOOD WOULD RUN OUT BEFORE YOU GOT MONEY TO BUY MORE.: PATIENT DECLINED

## 2024-05-09 SDOH — ECONOMIC STABILITY: INCOME INSECURITY: HOW HARD IS IT FOR YOU TO PAY FOR THE VERY BASICS LIKE FOOD, HOUSING, MEDICAL CARE, AND HEATING?: PATIENT DECLINED

## 2024-05-09 SDOH — ECONOMIC STABILITY: FOOD INSECURITY: WITHIN THE PAST 12 MONTHS, THE FOOD YOU BOUGHT JUST DIDN'T LAST AND YOU DIDN'T HAVE MONEY TO GET MORE.: PATIENT DECLINED

## 2024-05-09 ASSESSMENT — ENCOUNTER SYMPTOMS
COLOR CHANGE: 0
FACIAL SWELLING: 0
SHORTNESS OF BREATH: 0
ABDOMINAL DISTENTION: 0
DIARRHEA: 0
ABDOMINAL PAIN: 0
COUGH: 0
WHEEZING: 0
APNEA: 0
BACK PAIN: 0
CHEST TIGHTNESS: 0
CONSTIPATION: 0

## 2024-05-09 ASSESSMENT — PATIENT HEALTH QUESTIONNAIRE - PHQ9
1. LITTLE INTEREST OR PLEASURE IN DOING THINGS: NOT AT ALL
SUM OF ALL RESPONSES TO PHQ QUESTIONS 1-9: 0
SUM OF ALL RESPONSES TO PHQ QUESTIONS 1-9: 0
SUM OF ALL RESPONSES TO PHQ9 QUESTIONS 1 & 2: 0
SUM OF ALL RESPONSES TO PHQ QUESTIONS 1-9: 0
2. FEELING DOWN, DEPRESSED OR HOPELESS: NOT AT ALL
SUM OF ALL RESPONSES TO PHQ QUESTIONS 1-9: 0

## 2024-05-09 NOTE — PROGRESS NOTES
Subjective     Patient ID: Gage Cook is a 78 y.o. male.    HPI  patient is here for evaluation multimedical problem. He has hypertension, Adenomatous polyp( follows with Dr Jean) , Silicosis CKD, chronic, on allopurinol, BPH, history of dizziness   He developed a rash in his rt leg with allopurinol   Medication changes to Febuxistat , rash is improved   Dizziness is improved   Follows with Oncologist with Thrombocytopenia   Has silicosis , follows with Dr Ryan once  a year     Review of Systems   Constitutional:  Negative for activity change, appetite change, chills and diaphoresis.   HENT:  Negative for congestion, dental problem, ear discharge, facial swelling and hearing loss.    Respiratory:  Negative for apnea, cough, chest tightness, shortness of breath and wheezing.    Cardiovascular:  Negative for chest pain and leg swelling.   Gastrointestinal:  Negative for abdominal distention, abdominal pain, constipation and diarrhea.   Genitourinary:  Negative for difficulty urinating, dysuria, enuresis, flank pain and frequency.   Musculoskeletal:  Positive for arthralgias (Both shoulders). Negative for back pain, gait problem and joint swelling.   Skin:  Positive for rash (improving). Negative for color change and pallor.   Neurological:  Positive for dizziness (especially with Changing Position). Negative for seizures, facial asymmetry, light-headedness, numbness and headaches.   Psychiatric/Behavioral:  Negative for agitation, behavioral problems, confusion, decreased concentration and dysphoric mood.           Objective   Physical Exam  Vitals and nursing note reviewed.   Constitutional:       General: He is not in acute distress.     Appearance: He is well-developed. He is not diaphoretic.   HENT:      Head: Normocephalic and atraumatic.      Mouth/Throat:      Pharynx: No oropharyngeal exudate.   Eyes:      General: No scleral icterus.        Right eye: No discharge.         Left

## 2024-07-26 DIAGNOSIS — R42 DIZZINESS: ICD-10-CM

## 2024-07-26 RX ORDER — MECLIZINE HYDROCHLORIDE 25 MG/1
25 TABLET ORAL 3 TIMES DAILY PRN
Qty: 30 TABLET | Refills: 0 | Status: SHIPPED | OUTPATIENT
Start: 2024-07-26 | End: 2024-08-05

## 2024-09-09 ENCOUNTER — OFFICE VISIT (OUTPATIENT)
Dept: INTERNAL MEDICINE CLINIC | Age: 79
End: 2024-09-09
Payer: MEDICARE

## 2024-09-09 VITALS
DIASTOLIC BLOOD PRESSURE: 80 MMHG | HEART RATE: 77 BPM | BODY MASS INDEX: 26.04 KG/M2 | SYSTOLIC BLOOD PRESSURE: 138 MMHG | HEIGHT: 71 IN | WEIGHT: 186 LBS | OXYGEN SATURATION: 99 %

## 2024-09-09 DIAGNOSIS — R42 DIZZINESS: ICD-10-CM

## 2024-09-09 DIAGNOSIS — I10 ESSENTIAL HYPERTENSION: ICD-10-CM

## 2024-09-09 DIAGNOSIS — N18.32 STAGE 3B CHRONIC KIDNEY DISEASE (HCC): Primary | ICD-10-CM

## 2024-09-09 DIAGNOSIS — H61.23 BILATERAL IMPACTED CERUMEN: ICD-10-CM

## 2024-09-09 DIAGNOSIS — D69.6 THROMBOCYTOPENIA (HCC): ICD-10-CM

## 2024-09-09 PROCEDURE — 99214 OFFICE O/P EST MOD 30 MIN: CPT | Performed by: INTERNAL MEDICINE

## 2024-09-09 PROCEDURE — 3079F DIAST BP 80-89 MM HG: CPT | Performed by: INTERNAL MEDICINE

## 2024-09-09 PROCEDURE — G8427 DOCREV CUR MEDS BY ELIG CLIN: HCPCS | Performed by: INTERNAL MEDICINE

## 2024-09-09 PROCEDURE — 3075F SYST BP GE 130 - 139MM HG: CPT | Performed by: INTERNAL MEDICINE

## 2024-09-09 PROCEDURE — G8417 CALC BMI ABV UP PARAM F/U: HCPCS | Performed by: INTERNAL MEDICINE

## 2024-09-09 PROCEDURE — 1036F TOBACCO NON-USER: CPT | Performed by: INTERNAL MEDICINE

## 2024-09-09 PROCEDURE — 1123F ACP DISCUSS/DSCN MKR DOCD: CPT | Performed by: INTERNAL MEDICINE

## 2024-09-09 SDOH — ECONOMIC STABILITY: INCOME INSECURITY: HOW HARD IS IT FOR YOU TO PAY FOR THE VERY BASICS LIKE FOOD, HOUSING, MEDICAL CARE, AND HEATING?: PATIENT DECLINED

## 2024-09-09 SDOH — ECONOMIC STABILITY: FOOD INSECURITY: WITHIN THE PAST 12 MONTHS, YOU WORRIED THAT YOUR FOOD WOULD RUN OUT BEFORE YOU GOT MONEY TO BUY MORE.: PATIENT DECLINED

## 2024-09-09 SDOH — ECONOMIC STABILITY: FOOD INSECURITY: WITHIN THE PAST 12 MONTHS, THE FOOD YOU BOUGHT JUST DIDN'T LAST AND YOU DIDN'T HAVE MONEY TO GET MORE.: PATIENT DECLINED

## 2024-09-09 ASSESSMENT — ENCOUNTER SYMPTOMS
ABDOMINAL DISTENTION: 0
COUGH: 0
ABDOMINAL PAIN: 0
DIARRHEA: 0
CONSTIPATION: 0
CHEST TIGHTNESS: 0
BACK PAIN: 0
COLOR CHANGE: 0
APNEA: 0
WHEEZING: 0
SHORTNESS OF BREATH: 0
FACIAL SWELLING: 0

## 2024-09-09 ASSESSMENT — PATIENT HEALTH QUESTIONNAIRE - PHQ9
SUM OF ALL RESPONSES TO PHQ9 QUESTIONS 1 & 2: 2
SUM OF ALL RESPONSES TO PHQ QUESTIONS 1-9: 2
1. LITTLE INTEREST OR PLEASURE IN DOING THINGS: SEVERAL DAYS
SUM OF ALL RESPONSES TO PHQ QUESTIONS 1-9: 2
SUM OF ALL RESPONSES TO PHQ QUESTIONS 1-9: 2
2. FEELING DOWN, DEPRESSED OR HOPELESS: SEVERAL DAYS
SUM OF ALL RESPONSES TO PHQ QUESTIONS 1-9: 2

## 2024-09-13 ENCOUNTER — PATIENT MESSAGE (OUTPATIENT)
Dept: INTERNAL MEDICINE CLINIC | Age: 79
End: 2024-09-13

## 2024-09-13 DIAGNOSIS — R42 DIZZINESS: Primary | ICD-10-CM

## 2024-09-13 RX ORDER — MECLIZINE HYDROCHLORIDE 25 MG/1
25 TABLET ORAL 3 TIMES DAILY PRN
Qty: 90 TABLET | Refills: 0 | Status: SHIPPED | OUTPATIENT
Start: 2024-09-13 | End: 2024-10-13

## 2024-09-13 RX ORDER — MECLIZINE HYDROCHLORIDE 25 MG/1
25 TABLET ORAL 3 TIMES DAILY PRN
Qty: 90 TABLET | Refills: 0 | Status: SHIPPED | OUTPATIENT
Start: 2024-09-13 | End: 2024-09-13 | Stop reason: SDUPTHER

## 2024-11-20 ENCOUNTER — TELEPHONE (OUTPATIENT)
Dept: ONCOLOGY | Age: 79
End: 2024-11-20

## 2024-11-20 DIAGNOSIS — D69.6 THROMBOCYTOPENIA (HCC): Primary | ICD-10-CM

## 2024-11-23 ENCOUNTER — HOSPITAL ENCOUNTER (OUTPATIENT)
Age: 79
Discharge: HOME | End: 2024-11-23
Payer: MEDICARE

## 2024-11-23 DIAGNOSIS — R79.89 ELEVATED FERRITIN: ICD-10-CM

## 2024-11-23 DIAGNOSIS — D69.6 THROMBOCYTOPENIA (HCC): ICD-10-CM

## 2024-11-23 DIAGNOSIS — Z87.39 HX OF GOUT: ICD-10-CM

## 2024-11-23 LAB
BASOPHILS # BLD: 0 K/UL (ref 0–0.2)
BASOPHILS NFR BLD: 0 % (ref 0–2)
EOSINOPHIL # BLD: 0.1 K/UL (ref 0–0.4)
EOSINOPHILS RELATIVE PERCENT: 2 % (ref 0–4)
ERYTHROCYTE [DISTWIDTH] IN BLOOD BY AUTOMATED COUNT: 14 % (ref 11.5–14.9)
FERRITIN SERPL-MCNC: 518 NG/ML
HCT VFR BLD AUTO: 39.2 % (ref 41–53)
HGB BLD-MCNC: 13.1 G/DL (ref 13.5–17.5)
LYMPHOCYTES NFR BLD: 0.9 K/UL (ref 1–4.8)
LYMPHOCYTES RELATIVE PERCENT: 20 % (ref 24–44)
MCH RBC QN AUTO: 31.6 PG (ref 26–34)
MCHC RBC AUTO-ENTMCNC: 33.4 G/DL (ref 31–37)
MCV RBC AUTO: 94.8 FL (ref 80–100)
MONOCYTES NFR BLD: 0.4 K/UL (ref 0.1–1.3)
MONOCYTES NFR BLD: 8 % (ref 1–7)
NEUTROPHILS NFR BLD: 70 % (ref 36–66)
NEUTS SEG NFR BLD: 3.1 K/UL (ref 1.3–9.1)
PLATELET # BLD AUTO: 71 K/UL (ref 150–450)
PMV BLD AUTO: 9.2 FL (ref 6–12)
RBC # BLD AUTO: 4.14 M/UL (ref 4.5–5.9)
WBC OTHER # BLD: 4.5 K/UL (ref 3.5–11)

## 2024-11-23 PROCEDURE — 85025 COMPLETE CBC W/AUTO DIFF WBC: CPT

## 2024-11-23 PROCEDURE — 36415 COLL VENOUS BLD VENIPUNCTURE: CPT

## 2024-11-23 PROCEDURE — 82728 ASSAY OF FERRITIN: CPT

## 2024-12-03 ENCOUNTER — OFFICE VISIT (OUTPATIENT)
Dept: ONCOLOGY | Age: 79
End: 2024-12-03
Payer: MEDICARE

## 2024-12-03 ENCOUNTER — TELEPHONE (OUTPATIENT)
Dept: ONCOLOGY | Age: 79
End: 2024-12-03

## 2024-12-03 VITALS
HEART RATE: 67 BPM | TEMPERATURE: 96.8 F | DIASTOLIC BLOOD PRESSURE: 90 MMHG | BODY MASS INDEX: 26.61 KG/M2 | SYSTOLIC BLOOD PRESSURE: 214 MMHG | WEIGHT: 190.8 LBS | RESPIRATION RATE: 18 BRPM | OXYGEN SATURATION: 97 %

## 2024-12-03 DIAGNOSIS — I10 PRIMARY HYPERTENSION: ICD-10-CM

## 2024-12-03 DIAGNOSIS — D69.6 THROMBOCYTOPENIA (HCC): Primary | ICD-10-CM

## 2024-12-03 DIAGNOSIS — R79.89 ELEVATED FERRITIN: ICD-10-CM

## 2024-12-03 DIAGNOSIS — Z87.39 HX OF GOUT: ICD-10-CM

## 2024-12-03 PROCEDURE — 1036F TOBACCO NON-USER: CPT | Performed by: INTERNAL MEDICINE

## 2024-12-03 PROCEDURE — G8417 CALC BMI ABV UP PARAM F/U: HCPCS | Performed by: INTERNAL MEDICINE

## 2024-12-03 PROCEDURE — 1159F MED LIST DOCD IN RCRD: CPT | Performed by: INTERNAL MEDICINE

## 2024-12-03 PROCEDURE — G8484 FLU IMMUNIZE NO ADMIN: HCPCS | Performed by: INTERNAL MEDICINE

## 2024-12-03 PROCEDURE — 1123F ACP DISCUSS/DSCN MKR DOCD: CPT | Performed by: INTERNAL MEDICINE

## 2024-12-03 PROCEDURE — 3077F SYST BP >= 140 MM HG: CPT | Performed by: INTERNAL MEDICINE

## 2024-12-03 PROCEDURE — G8427 DOCREV CUR MEDS BY ELIG CLIN: HCPCS | Performed by: INTERNAL MEDICINE

## 2024-12-03 PROCEDURE — 99214 OFFICE O/P EST MOD 30 MIN: CPT | Performed by: INTERNAL MEDICINE

## 2024-12-03 PROCEDURE — 1126F AMNT PAIN NOTED NONE PRSNT: CPT | Performed by: INTERNAL MEDICINE

## 2024-12-03 PROCEDURE — 3079F DIAST BP 80-89 MM HG: CPT | Performed by: INTERNAL MEDICINE

## 2024-12-03 RX ORDER — AMLODIPINE BESYLATE 5 MG/1
5 TABLET ORAL DAILY
Qty: 30 TABLET | Refills: 1 | Status: SHIPPED | OUTPATIENT
Start: 2024-12-03

## 2024-12-03 NOTE — TELEPHONE ENCOUNTER
Instructions   from Dr. Annalisa Brunner MD    Rv in one year with cbc, cmp , uric acid       12/2/25 at 10 am with labs to be done the week before

## 2024-12-03 NOTE — TELEPHONE ENCOUNTER
Instructions   from Dr. Annalisa Brunner MD    Rv in one year with cbc, cmp , uric acid     Rv scheduled on 12/2/2025 @10am; gave pt their labs and their AVS

## 2024-12-03 NOTE — PROGRESS NOTES
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  But most of the elevation is related to acute inflammatory arthritis.  Anemia and thrombocytopenia. Concerning features - due to splenomegaly.   Gouty arthritis with multiple subcutaneous nodules    CURRENT THERAPY:  Received therapeutic phlebotomy when the ferritin was above 1000 but ferritin improved significantly after control of arthritis  Started on Probenicid, changed to Uloric    BRIEF CASE HISTORY:  Gage Cook is a very pleasant 79 y.o. male who is referred to us for elevated ferritin.  He has history of CKD, stable. He has history of gout, gouty arthritis.   He consumes alcohol rarely.   CBC also showed anemia and thrombocytopenia.  Follow up testing showed recovery of his hemoglobin but his platelets remain low.  Work up done 01/29/2018 showed continued elevated ferritin and heterozygous mutation for H63D, his uric acid was elevated. He was started Probenecid.  He was started on phlebotomy but things got much better with control of his gouty arthritis.  Ferritin improved significantly.  He was started on Uloric and that helped significantly.  INTERIM HISTORY: The patient presents to review for elevated ferritin and to review lab work.   He is fairly active, feels well.  He is avoiding alcohol and his gout seems to be under good control with the current treatment.      PAST MEDICAL HISTORY: has a past medical history of Acute renal failure (HCC), Anemia, Catalepsy, Chronic kidney disease, CKD (chronic kidney disease) stage 3, GFR 30-59 ml/min (HCC), Depression, Gout, HTN (hypertension), Silicosis (HCC), Skin cancer, Thrombocytopenia (HCC), and Unspecified hypertensive kidney disease with chronic kidney disease stage I through stage IV, or unspecified(403.90).    PAST SURGICAL HISTORY: has a past surgical history that includes Rotator cuff repair (Right); Lung biopsy; Colonoscopy (1/13/2012);

## 2024-12-31 ENCOUNTER — HOSPITAL ENCOUNTER (OUTPATIENT)
Dept: GENERAL RADIOLOGY | Age: 79
Discharge: HOME | End: 2025-01-02
Attending: INTERNAL MEDICINE
Payer: MEDICARE

## 2024-12-31 ENCOUNTER — HOSPITAL ENCOUNTER (OUTPATIENT)
Age: 79
Discharge: HOME | End: 2025-01-02
Payer: MEDICARE

## 2024-12-31 DIAGNOSIS — J84.89 BOOP (BRONCHIOLITIS OBLITERANS WITH ORGANIZING PNEUMONIA) (HCC): ICD-10-CM

## 2024-12-31 PROCEDURE — 71046 X-RAY EXAM CHEST 2 VIEWS: CPT

## 2025-01-10 ENCOUNTER — LAB (OUTPATIENT)
Dept: INTERNAL MEDICINE CLINIC | Age: 80
End: 2025-01-10
Payer: MEDICARE

## 2025-01-10 VITALS — HEIGHT: 71 IN | BODY MASS INDEX: 26.6 KG/M2 | WEIGHT: 190 LBS

## 2025-01-10 DIAGNOSIS — Z23 FLU VACCINE NEED: Primary | ICD-10-CM

## 2025-01-10 PROCEDURE — G0008 ADMIN INFLUENZA VIRUS VAC: HCPCS

## 2025-01-10 PROCEDURE — 90653 IIV ADJUVANT VACCINE IM: CPT

## 2025-01-10 NOTE — PROGRESS NOTES
Patient came into office today for flu vaccine.   Are you sick today? no    Are you allergic to eggs? no    Have you ever had a reaction to the flu vaccine? no    Have you ever had Ngoc- barre's Syndrome? no    Per order from provider VIS given to patient, consent received,  flu vaccine was administered and documented in vaccine part of chart patient tolerated well.

## 2025-02-11 DIAGNOSIS — R39.12 WEAK URINARY STREAM: ICD-10-CM

## 2025-02-11 RX ORDER — MECLIZINE HYDROCHLORIDE 25 MG/1
25 TABLET ORAL 3 TIMES DAILY PRN
Qty: 45 TABLET | Refills: 0 | Status: SHIPPED | OUTPATIENT
Start: 2025-02-11 | End: 2025-02-26

## 2025-02-11 RX ORDER — TAMSULOSIN HYDROCHLORIDE 0.4 MG/1
0.4 CAPSULE ORAL DAILY
Qty: 30 CAPSULE | Refills: 0 | Status: SHIPPED | OUTPATIENT
Start: 2025-02-11

## 2025-04-07 DIAGNOSIS — R39.12 WEAK URINARY STREAM: ICD-10-CM

## 2025-04-07 DIAGNOSIS — I10 ESSENTIAL HYPERTENSION: ICD-10-CM

## 2025-04-07 RX ORDER — TAMSULOSIN HYDROCHLORIDE 0.4 MG/1
0.4 CAPSULE ORAL DAILY
Qty: 90 CAPSULE | Refills: 0 | Status: SHIPPED | OUTPATIENT
Start: 2025-04-07

## 2025-04-07 RX ORDER — METOPROLOL SUCCINATE 50 MG/1
50 TABLET, EXTENDED RELEASE ORAL DAILY
Qty: 90 TABLET | Refills: 3 | Status: SHIPPED | OUTPATIENT
Start: 2025-04-07

## 2025-04-07 RX ORDER — AMLODIPINE BESYLATE 5 MG/1
5 TABLET ORAL DAILY
Qty: 90 TABLET | Refills: 1 | Status: SHIPPED | OUTPATIENT
Start: 2025-04-07

## 2025-04-18 ENCOUNTER — PATIENT MESSAGE (OUTPATIENT)
Dept: FAMILY MEDICINE CLINIC | Age: 80
End: 2025-04-18

## 2025-05-07 ENCOUNTER — TELEPHONE (OUTPATIENT)
Dept: INTERNAL MEDICINE CLINIC | Age: 80
End: 2025-05-07

## 2025-06-30 SDOH — ECONOMIC STABILITY: TRANSPORTATION INSECURITY
IN THE PAST 12 MONTHS, HAS THE LACK OF TRANSPORTATION KEPT YOU FROM MEDICAL APPOINTMENTS OR FROM GETTING MEDICATIONS?: NO

## 2025-06-30 SDOH — ECONOMIC STABILITY: FOOD INSECURITY: WITHIN THE PAST 12 MONTHS, THE FOOD YOU BOUGHT JUST DIDN'T LAST AND YOU DIDN'T HAVE MONEY TO GET MORE.: NEVER TRUE

## 2025-06-30 SDOH — ECONOMIC STABILITY: FOOD INSECURITY: WITHIN THE PAST 12 MONTHS, YOU WORRIED THAT YOUR FOOD WOULD RUN OUT BEFORE YOU GOT MONEY TO BUY MORE.: NEVER TRUE

## 2025-06-30 SDOH — HEALTH STABILITY: PHYSICAL HEALTH: ON AVERAGE, HOW MANY DAYS PER WEEK DO YOU ENGAGE IN MODERATE TO STRENUOUS EXERCISE (LIKE A BRISK WALK)?: 3 DAYS

## 2025-06-30 SDOH — HEALTH STABILITY: PHYSICAL HEALTH: ON AVERAGE, HOW MANY MINUTES DO YOU ENGAGE IN EXERCISE AT THIS LEVEL?: 20 MIN

## 2025-06-30 SDOH — ECONOMIC STABILITY: INCOME INSECURITY: IN THE LAST 12 MONTHS, WAS THERE A TIME WHEN YOU WERE NOT ABLE TO PAY THE MORTGAGE OR RENT ON TIME?: NO

## 2025-06-30 SDOH — ECONOMIC STABILITY: TRANSPORTATION INSECURITY
IN THE PAST 12 MONTHS, HAS LACK OF TRANSPORTATION KEPT YOU FROM MEETINGS, WORK, OR FROM GETTING THINGS NEEDED FOR DAILY LIVING?: NO

## 2025-06-30 ASSESSMENT — PATIENT HEALTH QUESTIONNAIRE - PHQ9
SUM OF ALL RESPONSES TO PHQ QUESTIONS 1-9: 2
2. FEELING DOWN, DEPRESSED OR HOPELESS: SEVERAL DAYS
1. LITTLE INTEREST OR PLEASURE IN DOING THINGS: SEVERAL DAYS
SUM OF ALL RESPONSES TO PHQ QUESTIONS 1-9: 2

## 2025-06-30 ASSESSMENT — LIFESTYLE VARIABLES
HOW MANY STANDARD DRINKS CONTAINING ALCOHOL DO YOU HAVE ON A TYPICAL DAY: 0
HOW OFTEN DO YOU HAVE A DRINK CONTAINING ALCOHOL: 1
HOW MANY STANDARD DRINKS CONTAINING ALCOHOL DO YOU HAVE ON A TYPICAL DAY: PATIENT DOES NOT DRINK
HOW OFTEN DO YOU HAVE SIX OR MORE DRINKS ON ONE OCCASION: 1
HOW OFTEN DO YOU HAVE A DRINK CONTAINING ALCOHOL: NEVER

## 2025-07-01 ENCOUNTER — OFFICE VISIT (OUTPATIENT)
Dept: INTERNAL MEDICINE CLINIC | Age: 80
End: 2025-07-01

## 2025-07-01 VITALS
OXYGEN SATURATION: 98 % | HEART RATE: 68 BPM | TEMPERATURE: 97.5 F | SYSTOLIC BLOOD PRESSURE: 136 MMHG | RESPIRATION RATE: 18 BRPM | WEIGHT: 189.8 LBS | HEIGHT: 71 IN | DIASTOLIC BLOOD PRESSURE: 70 MMHG | BODY MASS INDEX: 26.57 KG/M2

## 2025-07-01 DIAGNOSIS — Z00.00 MEDICARE ANNUAL WELLNESS VISIT, SUBSEQUENT: Primary | ICD-10-CM

## 2025-07-01 RX ORDER — MECLIZINE HYDROCHLORIDE 25 MG/1
25 TABLET ORAL 3 TIMES DAILY PRN
COMMUNITY

## 2025-07-01 RX ORDER — MECLIZINE HYDROCHLORIDE 25 MG/1
25 TABLET ORAL 3 TIMES DAILY PRN
Qty: 45 TABLET | Refills: 0 | Status: SHIPPED | OUTPATIENT
Start: 2025-07-01 | End: 2025-07-16

## 2025-07-01 NOTE — TELEPHONE ENCOUNTER
Gage Cook is calling to request a refill on the following medication(s):    Medication Request:  Requested Prescriptions     Pending Prescriptions Disp Refills    meclizine (ANTIVERT) 25 MG tablet 45 tablet 0     Sig: Take 1 tablet by mouth 3 times daily as needed for Dizziness       Last Visit Date (If Applicable):  9/9/2024    Next Visit Date:    7/1/2025 - AWV with Shilpi    Last refilled 2/26/2025  Rx Pending

## 2025-07-01 NOTE — PATIENT INSTRUCTIONS
exactly as prescribed. Call your doctor if you think you are having a problem with your medicine.     If your doctor recommends aspirin, take the amount directed each day. Make sure you take aspirin and not another kind of pain reliever, such as acetaminophen (Tylenol).   When should you call for help?   Call 911 if you have symptoms of a heart attack. These may include:    Chest pain or pressure, or a strange feeling in the chest.     Sweating.     Shortness of breath.     Pain, pressure, or a strange feeling in the back, neck, jaw, or upper belly or in one or both shoulders or arms.     Lightheadedness or sudden weakness.     A fast or irregular heartbeat.   After you call 911, the  may tell you to chew 1 adult-strength or 2 to 4 low-dose aspirin. Wait for an ambulance. Do not try to drive yourself.  Watch closely for changes in your health, and be sure to contact your doctor if you have any problems.  Where can you learn more?  Go to https://www.[employer].net/patientEd and enter F075 to learn more about \"A Healthy Heart: Care Instructions.\"  Current as of: July 31, 2024  Content Version: 14.5  © 2682-7306 [employer].   Care instructions adapted under license by [employer]. If you have questions about a medical condition or this instruction, always ask your healthcare professional. [employer], disclaims any warranty or liability for your use of this information.    Personalized Preventive Plan for Gage Cook - 7/1/2025  Medicare offers a range of preventive health benefits. Some of the tests and screenings are paid in full while other may be subject to a deductible, co-insurance, and/or copay.  Some of these benefits include a comprehensive review of your medical history including lifestyle, illnesses that may run in your family, and various assessments and screenings as appropriate.  After reviewing your medical record and screening and assessments performed

## 2025-07-01 NOTE — PROGRESS NOTES
Medicare Annual Wellness Visit    Gage Cook is here for Medicare AWV and Immunizations (Due for covid, DTaP and RSV vaccines)    Assessment & Plan        No follow-ups on file.     Subjective   Medicare Annual Wellness Visit    Patient's complete Health Risk Assessment and screening values have been reviewed and are found in Flowsheets. The following problems were reviewed today and where indicated follow up appointments were made and/or referrals ordered.    Positive Risk Factor Screenings with Interventions:     Cognitive:   Clock Drawing Test (CDT): (!) Abnormal  Words recalled: 2 Words Recalled  Total Score: (!) 2  Total Score Interpretation: Abnormal Mini-Cog    Interventions:  Patient declines any further evaluation or treatment                Hearing Screen:  Do you or your family notice any trouble with your hearing that hasn't been managed with hearing aids?: (!) (Patient-Rptd) Yes    Interventions:  Patient comments: has gone to audiologist, doesn't not want to go again d/t cost  Patient declines any further evaluation or treatment       Advanced Directives:  Do you have a Living Will?: (!) (Patient-Rptd) No    Intervention:  has NO advanced directive - information provided         Medicare Annual Wellness Visit Express Report     Sexual Activity    Sexually active; Partners: Female.     Sexual Activity Last Reviewed    Sexual Activity    Reviewed By Date/Time   Shilpi Clark LPN 7/1/2025  8:23 AM   Radha Calloway RN 9/14/2023  6:31 PM     Fall Risk Screening Results    Flowsheet [facility] Office Visit from 7/1/2025 in [facility] Office Visit from 11/22/2023 in [facility]   Fall Risk Assessment     Do you feel unsteady or are you worried about falling? no  yes Abnormal    2 or more falls in past year? no  no   Fall with injury in past year? no  no   Timed Up and Go Test > 12 seconds? -- --     Cognitive Screening Results    Flowsheet [facility] Office Visit from 7/1/2025 in [facility]

## 2025-07-08 RX ORDER — MECLIZINE HYDROCHLORIDE 25 MG/1
25 TABLET ORAL 3 TIMES DAILY PRN
Qty: 30 TABLET | Refills: 1 | Status: SHIPPED | OUTPATIENT
Start: 2025-07-08

## 2025-08-27 ENCOUNTER — OFFICE VISIT (OUTPATIENT)
Dept: INTERNAL MEDICINE CLINIC | Age: 80
End: 2025-08-27

## 2025-08-27 VITALS
WEIGHT: 189 LBS | DIASTOLIC BLOOD PRESSURE: 82 MMHG | OXYGEN SATURATION: 98 % | HEIGHT: 71 IN | BODY MASS INDEX: 26.46 KG/M2 | SYSTOLIC BLOOD PRESSURE: 172 MMHG | HEART RATE: 70 BPM

## 2025-08-27 DIAGNOSIS — N18.32 STAGE 3B CHRONIC KIDNEY DISEASE (HCC): ICD-10-CM

## 2025-08-27 DIAGNOSIS — R42 DIZZINESS OF UNKNOWN ETIOLOGY: ICD-10-CM

## 2025-08-27 DIAGNOSIS — I10 ESSENTIAL HYPERTENSION: Primary | ICD-10-CM

## 2025-08-27 DIAGNOSIS — J84.89 BOOP (BRONCHIOLITIS OBLITERANS WITH ORGANIZING PNEUMONIA) (HCC): ICD-10-CM

## 2025-08-27 DIAGNOSIS — M1A.9XX1 CHRONIC GOUT WITH TOPHUS, UNSPECIFIED CAUSE, UNSPECIFIED SITE: ICD-10-CM

## 2025-08-27 RX ORDER — AMLODIPINE BESYLATE 5 MG/1
5 TABLET ORAL DAILY
Qty: 90 TABLET | Refills: 1 | Status: ON HOLD | OUTPATIENT
Start: 2025-08-27 | End: 2025-08-31 | Stop reason: HOSPADM

## 2025-08-27 RX ORDER — CARVEDILOL 12.5 MG/1
12.5 TABLET ORAL 2 TIMES DAILY
Qty: 60 TABLET | Refills: 3 | Status: SHIPPED | OUTPATIENT
Start: 2025-08-27

## 2025-08-27 ASSESSMENT — ENCOUNTER SYMPTOMS
COUGH: 0
ABDOMINAL PAIN: 0
NAUSEA: 0
SHORTNESS OF BREATH: 0
WHEEZING: 0
BACK PAIN: 0
VOMITING: 0

## 2025-08-28 ENCOUNTER — HOSPITAL ENCOUNTER (INPATIENT)
Age: 80
LOS: 3 days | Discharge: HOME | DRG: 641 | End: 2025-08-31
Attending: COUNSELOR
Payer: MEDICARE

## 2025-08-28 ENCOUNTER — TELEPHONE (OUTPATIENT)
Dept: INTERNAL MEDICINE CLINIC | Age: 80
End: 2025-08-28

## 2025-08-28 ENCOUNTER — HOSPITAL ENCOUNTER (OUTPATIENT)
Age: 80
Setting detail: SPECIMEN
Discharge: HOME | End: 2025-08-28

## 2025-08-28 DIAGNOSIS — R79.89 ELEVATED TROPONIN: ICD-10-CM

## 2025-08-28 DIAGNOSIS — I10 ESSENTIAL HYPERTENSION: ICD-10-CM

## 2025-08-28 DIAGNOSIS — N18.32 STAGE 3B CHRONIC KIDNEY DISEASE (HCC): ICD-10-CM

## 2025-08-28 DIAGNOSIS — E87.5 HYPERKALEMIA: Primary | ICD-10-CM

## 2025-08-28 DIAGNOSIS — R42 DIZZINESS: ICD-10-CM

## 2025-08-28 LAB
ALBUMIN SERPL-MCNC: 4.1 G/DL (ref 3.5–5.2)
ALBUMIN SERPL-MCNC: 4.1 G/DL (ref 3.5–5.2)
ALBUMIN/GLOB SERPL: 1.4 {RATIO} (ref 1–2.5)
ALP SERPL-CCNC: 65 U/L (ref 40–129)
ALP SERPL-CCNC: 70 U/L (ref 40–129)
ALT SERPL-CCNC: 21 U/L (ref 10–50)
ALT SERPL-CCNC: 21 U/L (ref 10–50)
ANION GAP SERPL CALCULATED.3IONS-SCNC: 10 MMOL/L (ref 9–16)
ANION GAP SERPL CALCULATED.3IONS-SCNC: 9 MMOL/L (ref 9–16)
ANION GAP SERPL CALCULATED.3IONS-SCNC: 9 MMOL/L (ref 9–16)
AST SERPL-CCNC: 36 U/L (ref 10–50)
AST SERPL-CCNC: 37 U/L (ref 10–50)
BACTERIA URNS QL MICRO: ABNORMAL
BASOPHILS # BLD: 0.03 K/UL (ref 0–0.2)
BASOPHILS # BLD: <0.03 K/UL (ref 0–0.2)
BASOPHILS NFR BLD: 0 % (ref 0–2)
BASOPHILS NFR BLD: 1 % (ref 0–2)
BILIRUB SERPL-MCNC: 0.8 MG/DL (ref 0–1.2)
BILIRUB SERPL-MCNC: 0.8 MG/DL (ref 0–1.2)
BILIRUB UR QL STRIP: NEGATIVE
BUN SERPL-MCNC: 43 MG/DL (ref 8–23)
BUN SERPL-MCNC: 43 MG/DL (ref 8–23)
CALCIUM SERPL-MCNC: 9.1 MG/DL (ref 8.6–10.4)
CALCIUM SERPL-MCNC: 9.6 MG/DL (ref 8.6–10.4)
CASTS #/AREA URNS LPF: ABNORMAL /LPF
CHLORIDE SERPL-SCNC: 107 MMOL/L (ref 98–107)
CHLORIDE SERPL-SCNC: 108 MMOL/L (ref 98–107)
CHLORIDE SERPL-SCNC: 108 MMOL/L (ref 98–107)
CHLORIDE UR-SCNC: 73 MMOL/L
CHOLEST SERPL-MCNC: 167 MG/DL (ref 0–199)
CHOLESTEROL/HDL RATIO: 6.7
CHP ED QC CHECK: YES
CHP ED QC CHECK: YES
CLARITY UR: CLEAR
CO2 SERPL-SCNC: 18 MMOL/L (ref 20–31)
CO2 SERPL-SCNC: 19 MMOL/L (ref 20–31)
CO2 SERPL-SCNC: 20 MMOL/L (ref 20–31)
COLOR UR: YELLOW
CREAT SERPL-MCNC: 1.9 MG/DL (ref 0.7–1.2)
CREAT SERPL-MCNC: 2 MG/DL (ref 0.7–1.2)
EOSINOPHIL # BLD: 0.12 K/UL (ref 0–0.44)
EOSINOPHIL # BLD: 0.13 K/UL (ref 0–0.44)
EOSINOPHILS RELATIVE PERCENT: 3 % (ref 0–4)
EOSINOPHILS RELATIVE PERCENT: 3 % (ref 1–4)
EPI CELLS #/AREA URNS HPF: ABNORMAL /HPF
ERYTHROCYTE [DISTWIDTH] IN BLOOD BY AUTOMATED COUNT: 13.2 % (ref 11.5–14.9)
ERYTHROCYTE [DISTWIDTH] IN BLOOD BY AUTOMATED COUNT: 13.6 % (ref 11.8–14.4)
GFR, ESTIMATED: 33 ML/MIN/1.73M2
GFR, ESTIMATED: 35 ML/MIN/1.73M2
GLUCOSE BLD-MCNC: 105 MG/DL (ref 75–110)
GLUCOSE BLD-MCNC: 121 MG/DL
GLUCOSE BLD-MCNC: 121 MG/DL (ref 75–110)
GLUCOSE BLD-MCNC: 154 MG/DL (ref 75–110)
GLUCOSE BLD-MCNC: 48 MG/DL (ref 75–110)
GLUCOSE BLD-MCNC: 71 MG/DL
GLUCOSE BLD-MCNC: 72 MG/DL (ref 75–110)
GLUCOSE SERPL-MCNC: 85 MG/DL (ref 74–99)
GLUCOSE SERPL-MCNC: 96 MG/DL (ref 74–99)
GLUCOSE UR STRIP-MCNC: NEGATIVE MG/DL
HCT VFR BLD AUTO: 39.6 % (ref 41–53)
HCT VFR BLD AUTO: 41.1 % (ref 40.7–50.3)
HDLC SERPL-MCNC: 25 MG/DL
HGB BLD-MCNC: 13.2 G/DL (ref 13.5–17.5)
HGB BLD-MCNC: 13.2 G/DL (ref 13–17)
HGB UR QL STRIP.AUTO: NEGATIVE
IMM GRANULOCYTES # BLD AUTO: <0.03 K/UL (ref 0–0.3)
IMM GRANULOCYTES # BLD AUTO: <0.03 K/UL (ref 0–0.3)
IMM GRANULOCYTES NFR BLD: 0 %
IMM GRANULOCYTES NFR BLD: 0 %
KETONES UR STRIP-MCNC: NEGATIVE MG/DL
LDLC SERPL CALC-MCNC: 107 MG/DL (ref 0–100)
LEUKOCYTE ESTERASE UR QL STRIP: NEGATIVE
LYMPHOCYTES NFR BLD: 1.07 K/UL (ref 1.1–3.7)
LYMPHOCYTES NFR BLD: 1.14 K/UL (ref 1.1–3.7)
LYMPHOCYTES RELATIVE PERCENT: 24 % (ref 24–44)
LYMPHOCYTES RELATIVE PERCENT: 25 % (ref 24–43)
MAGNESIUM SERPL-MCNC: 2.2 MG/DL (ref 1.6–2.4)
MCH RBC QN AUTO: 30.9 PG (ref 25.2–33.5)
MCH RBC QN AUTO: 31.2 PG (ref 26–34)
MCHC RBC AUTO-ENTMCNC: 32.1 G/DL (ref 28.4–34.8)
MCHC RBC AUTO-ENTMCNC: 33.3 G/DL (ref 31–37)
MCV RBC AUTO: 93.6 FL (ref 80–100)
MCV RBC AUTO: 96.3 FL (ref 82.6–102.9)
MONOCYTES NFR BLD: 0.39 K/UL (ref 0.1–1.2)
MONOCYTES NFR BLD: 0.39 K/UL (ref 0.1–1.2)
MONOCYTES NFR BLD: 9 % (ref 3–12)
MONOCYTES NFR BLD: 9 % (ref 3–12)
NEUTROPHILS NFR BLD: 62 % (ref 36–65)
NEUTROPHILS NFR BLD: 64 % (ref 36–66)
NEUTS SEG NFR BLD: 2.82 K/UL (ref 1.5–8.1)
NEUTS SEG NFR BLD: 2.91 K/UL (ref 1.5–8.1)
NITRITE UR QL STRIP: NEGATIVE
NRBC BLD-RTO: 0 PER 100 WBC
NRBC BLD-RTO: 0 PER 100 WBC
PH UR STRIP: 6.5 [PH] (ref 5–8)
PLATELET # BLD AUTO: 87 K/UL (ref 138–453)
PLATELET # BLD AUTO: ABNORMAL K/UL (ref 150–450)
PLATELET, FLUORESCENCE: 88 K/UL (ref 150–450)
PLATELETS.RETICULATED NFR BLD AUTO: 3.1 % (ref 1.1–10.3)
PMV BLD AUTO: 10.4 FL (ref 8–13.5)
PMV BLD AUTO: 11.1 FL (ref 8.1–13.5)
POTASSIUM SERPL-SCNC: 5.2 MMOL/L (ref 3.7–5.3)
POTASSIUM SERPL-SCNC: 6.4 MMOL/L (ref 3.7–5.3)
POTASSIUM SERPL-SCNC: 6.5 MMOL/L (ref 3.7–5.3)
PROT SERPL-MCNC: 7 G/DL (ref 6.6–8.7)
PROT SERPL-MCNC: 7.1 G/DL (ref 6.6–8.7)
PROT UR STRIP-MCNC: ABNORMAL MG/DL
RBC # BLD AUTO: 4.23 M/UL (ref 4.21–5.77)
RBC # BLD AUTO: 4.27 M/UL (ref 4.21–5.77)
RBC #/AREA URNS HPF: ABNORMAL /HPF
SODIUM SERPL-SCNC: 135 MMOL/L (ref 136–145)
SODIUM SERPL-SCNC: 136 MMOL/L (ref 136–145)
SODIUM SERPL-SCNC: 137 MMOL/L (ref 136–145)
SODIUM UR-SCNC: 75 MMOL/L
SP GR UR STRIP: 1.01 (ref 1–1.03)
TRIGL SERPL-MCNC: 176 MG/DL
TROPONIN I SERPL HS-MCNC: 33 NG/L (ref 0–22)
TROPONIN I SERPL HS-MCNC: 40 NG/L (ref 0–22)
URATE SERPL-MCNC: 5.4 MG/DL (ref 3.4–7)
UROBILINOGEN UR STRIP-ACNC: NORMAL EU/DL (ref 0–1)
VLDLC SERPL CALC-MCNC: 35 MG/DL (ref 1–30)
WBC #/AREA URNS HPF: ABNORMAL /HPF
WBC OTHER # BLD: 4.5 K/UL (ref 3.5–11)
WBC OTHER # BLD: 4.5 K/UL (ref 3.5–11.3)

## 2025-08-28 PROCEDURE — 2580000003 HC RX 258

## 2025-08-28 PROCEDURE — 84484 ASSAY OF TROPONIN QUANT: CPT

## 2025-08-28 PROCEDURE — 80053 COMPREHEN METABOLIC PANEL: CPT

## 2025-08-28 PROCEDURE — 81001 URINALYSIS AUTO W/SCOPE: CPT

## 2025-08-28 PROCEDURE — 96365 THER/PROPH/DIAG IV INF INIT: CPT

## 2025-08-28 PROCEDURE — 82947 ASSAY GLUCOSE BLOOD QUANT: CPT

## 2025-08-28 PROCEDURE — 6360000002 HC RX W HCPCS

## 2025-08-28 PROCEDURE — 83735 ASSAY OF MAGNESIUM: CPT

## 2025-08-28 PROCEDURE — 93005 ELECTROCARDIOGRAM TRACING: CPT

## 2025-08-28 PROCEDURE — 2060000000 HC ICU INTERMEDIATE R&B

## 2025-08-28 PROCEDURE — 99285 EMERGENCY DEPT VISIT HI MDM: CPT

## 2025-08-28 PROCEDURE — 85025 COMPLETE CBC W/AUTO DIFF WBC: CPT

## 2025-08-28 PROCEDURE — 84300 ASSAY OF URINE SODIUM: CPT

## 2025-08-28 PROCEDURE — 6370000000 HC RX 637 (ALT 250 FOR IP)

## 2025-08-28 PROCEDURE — 36415 COLL VENOUS BLD VENIPUNCTURE: CPT

## 2025-08-28 PROCEDURE — 80051 ELECTROLYTE PANEL: CPT

## 2025-08-28 PROCEDURE — 82436 ASSAY OF URINE CHLORIDE: CPT

## 2025-08-28 RX ORDER — POTASSIUM CHLORIDE 7.45 MG/ML
10 INJECTION INTRAVENOUS PRN
Status: DISCONTINUED | OUTPATIENT
Start: 2025-08-28 | End: 2025-08-31 | Stop reason: HOSPADM

## 2025-08-28 RX ORDER — ONDANSETRON 4 MG/1
4 TABLET, ORALLY DISINTEGRATING ORAL EVERY 8 HOURS PRN
Status: DISCONTINUED | OUTPATIENT
Start: 2025-08-28 | End: 2025-08-31 | Stop reason: HOSPADM

## 2025-08-28 RX ORDER — CALCIUM GLUCONATE 20 MG/ML
1000 INJECTION, SOLUTION INTRAVENOUS ONCE
Status: COMPLETED | OUTPATIENT
Start: 2025-08-28 | End: 2025-08-28

## 2025-08-28 RX ORDER — AMLODIPINE BESYLATE 5 MG/1
5 TABLET ORAL DAILY
Status: DISCONTINUED | OUTPATIENT
Start: 2025-08-29 | End: 2025-08-31 | Stop reason: HOSPADM

## 2025-08-28 RX ORDER — MAGNESIUM SULFATE HEPTAHYDRATE 40 MG/ML
2000 INJECTION, SOLUTION INTRAVENOUS PRN
Status: DISCONTINUED | OUTPATIENT
Start: 2025-08-28 | End: 2025-08-31 | Stop reason: HOSPADM

## 2025-08-28 RX ORDER — ACETAMINOPHEN 325 MG/1
650 TABLET ORAL EVERY 6 HOURS PRN
Status: DISCONTINUED | OUTPATIENT
Start: 2025-08-28 | End: 2025-08-31 | Stop reason: HOSPADM

## 2025-08-28 RX ORDER — SODIUM CHLORIDE 9 MG/ML
INJECTION, SOLUTION INTRAVENOUS PRN
Status: DISCONTINUED | OUTPATIENT
Start: 2025-08-28 | End: 2025-08-31 | Stop reason: HOSPADM

## 2025-08-28 RX ORDER — DEXTROSE MONOHYDRATE 100 MG/ML
INJECTION, SOLUTION INTRAVENOUS CONTINUOUS PRN
Status: DISCONTINUED | OUTPATIENT
Start: 2025-08-28 | End: 2025-08-31 | Stop reason: HOSPADM

## 2025-08-28 RX ORDER — SODIUM CHLORIDE 0.9 % (FLUSH) 0.9 %
10 SYRINGE (ML) INJECTION PRN
Status: DISCONTINUED | OUTPATIENT
Start: 2025-08-28 | End: 2025-08-31 | Stop reason: HOSPADM

## 2025-08-28 RX ORDER — CARVEDILOL 12.5 MG/1
12.5 TABLET ORAL 2 TIMES DAILY
Status: DISCONTINUED | OUTPATIENT
Start: 2025-08-28 | End: 2025-08-29

## 2025-08-28 RX ORDER — ACETAMINOPHEN 650 MG/1
650 SUPPOSITORY RECTAL EVERY 6 HOURS PRN
Status: DISCONTINUED | OUTPATIENT
Start: 2025-08-28 | End: 2025-08-31 | Stop reason: HOSPADM

## 2025-08-28 RX ORDER — POTASSIUM CHLORIDE 1500 MG/1
40 TABLET, EXTENDED RELEASE ORAL PRN
Status: DISCONTINUED | OUTPATIENT
Start: 2025-08-28 | End: 2025-08-31 | Stop reason: HOSPADM

## 2025-08-28 RX ORDER — ONDANSETRON 2 MG/ML
4 INJECTION INTRAMUSCULAR; INTRAVENOUS EVERY 6 HOURS PRN
Status: DISCONTINUED | OUTPATIENT
Start: 2025-08-28 | End: 2025-08-31 | Stop reason: HOSPADM

## 2025-08-28 RX ORDER — FEBUXOSTAT 40 MG/1
40 TABLET, FILM COATED ORAL DAILY
Status: DISCONTINUED | OUTPATIENT
Start: 2025-08-29 | End: 2025-08-31 | Stop reason: HOSPADM

## 2025-08-28 RX ORDER — SODIUM CHLORIDE 9 MG/ML
INJECTION, SOLUTION INTRAVENOUS CONTINUOUS
Status: ACTIVE | OUTPATIENT
Start: 2025-08-28 | End: 2025-08-28

## 2025-08-28 RX ADMIN — SODIUM ZIRCONIUM CYCLOSILICATE 10 G: 5 POWDER, FOR SUSPENSION ORAL at 18:25

## 2025-08-28 RX ADMIN — CALCIUM GLUCONATE 1000 MG: 20 INJECTION, SOLUTION INTRAVENOUS at 17:44

## 2025-08-28 RX ADMIN — INSULIN HUMAN 10 UNITS: 100 INJECTION, SOLUTION PARENTERAL at 18:29

## 2025-08-28 RX ADMIN — DEXTROSE 250 ML: 10 SOLUTION INTRAVENOUS at 20:02

## 2025-08-28 RX ADMIN — SODIUM CHLORIDE: 0.9 INJECTION, SOLUTION INTRAVENOUS at 18:32

## 2025-08-28 RX ADMIN — CARVEDILOL 12.5 MG: 12.5 TABLET, FILM COATED ORAL at 22:18

## 2025-08-28 RX ADMIN — DEXTROSE 250 ML: 10 SOLUTION INTRAVENOUS at 18:33

## 2025-08-28 ASSESSMENT — LIFESTYLE VARIABLES
HOW OFTEN DO YOU HAVE A DRINK CONTAINING ALCOHOL: NEVER
HOW MANY STANDARD DRINKS CONTAINING ALCOHOL DO YOU HAVE ON A TYPICAL DAY: PATIENT DOES NOT DRINK

## 2025-08-28 ASSESSMENT — PAIN SCALES - GENERAL
PAINLEVEL_OUTOF10: 0

## 2025-08-28 ASSESSMENT — PAIN - FUNCTIONAL ASSESSMENT: PAIN_FUNCTIONAL_ASSESSMENT: 0-10

## 2025-08-29 LAB
ANION GAP SERPL CALCULATED.3IONS-SCNC: 10 MMOL/L (ref 9–16)
BASOPHILS # BLD: <0.03 K/UL (ref 0–0.2)
BASOPHILS NFR BLD: 0 % (ref 0–2)
BUN SERPL-MCNC: 38 MG/DL (ref 8–23)
CALCIUM SERPL-MCNC: 9 MG/DL (ref 8.6–10.4)
CHLORIDE SERPL-SCNC: 109 MMOL/L (ref 98–107)
CO2 SERPL-SCNC: 17 MMOL/L (ref 20–31)
CREAT SERPL-MCNC: 1.7 MG/DL (ref 0.7–1.2)
EKG ATRIAL RATE: 64 BPM
EKG P AXIS: 58 DEGREES
EKG P-R INTERVAL: 218 MS
EKG Q-T INTERVAL: 386 MS
EKG QRS DURATION: 90 MS
EKG QTC CALCULATION (BAZETT): 398 MS
EKG R AXIS: 93 DEGREES
EKG T AXIS: 43 DEGREES
EKG VENTRICULAR RATE: 64 BPM
EOSINOPHIL # BLD: 0.11 K/UL (ref 0–0.44)
EOSINOPHILS RELATIVE PERCENT: 3 % (ref 0–4)
ERYTHROCYTE [DISTWIDTH] IN BLOOD BY AUTOMATED COUNT: 12.9 % (ref 11.5–14.9)
GFR, ESTIMATED: 40 ML/MIN/1.73M2
GLUCOSE BLD-MCNC: 117 MG/DL (ref 75–110)
GLUCOSE BLD-MCNC: 121 MG/DL (ref 75–110)
GLUCOSE BLD-MCNC: 135 MG/DL (ref 75–110)
GLUCOSE BLD-MCNC: 93 MG/DL (ref 75–110)
GLUCOSE BLD-MCNC: 94 MG/DL (ref 75–110)
GLUCOSE SERPL-MCNC: 91 MG/DL (ref 74–99)
HCT VFR BLD AUTO: 36.3 % (ref 41–53)
HGB BLD-MCNC: 12.1 G/DL (ref 13.5–17.5)
IMM GRANULOCYTES # BLD AUTO: <0.03 K/UL (ref 0–0.3)
IMM GRANULOCYTES NFR BLD: 0 %
LYMPHOCYTES NFR BLD: 0.99 K/UL (ref 1.1–3.7)
LYMPHOCYTES RELATIVE PERCENT: 27 % (ref 24–44)
MCH RBC QN AUTO: 30.9 PG (ref 26–34)
MCHC RBC AUTO-ENTMCNC: 33.3 G/DL (ref 31–37)
MCV RBC AUTO: 92.8 FL (ref 80–100)
MONOCYTES NFR BLD: 0.33 K/UL (ref 0.1–1.2)
MONOCYTES NFR BLD: 9 % (ref 3–12)
NEUTROPHILS NFR BLD: 61 % (ref 36–66)
NEUTS SEG NFR BLD: 2.24 K/UL (ref 1.5–8.1)
NRBC BLD-RTO: 0 PER 100 WBC
PLATELET # BLD AUTO: 68 K/UL (ref 150–450)
PMV BLD AUTO: 10.7 FL (ref 8–13.5)
POTASSIUM SERPL-SCNC: 5.6 MMOL/L (ref 3.7–5.3)
POTASSIUM SERPL-SCNC: 5.8 MMOL/L (ref 3.7–5.3)
RBC # BLD AUTO: 3.91 M/UL (ref 4.21–5.77)
SODIUM SERPL-SCNC: 136 MMOL/L (ref 136–145)
WBC OTHER # BLD: 3.7 K/UL (ref 3.5–11)

## 2025-08-29 PROCEDURE — 80048 BASIC METABOLIC PNL TOTAL CA: CPT

## 2025-08-29 PROCEDURE — 6370000000 HC RX 637 (ALT 250 FOR IP)

## 2025-08-29 PROCEDURE — 93010 ELECTROCARDIOGRAM REPORT: CPT | Performed by: INTERNAL MEDICINE

## 2025-08-29 PROCEDURE — 99223 1ST HOSP IP/OBS HIGH 75: CPT

## 2025-08-29 PROCEDURE — 85025 COMPLETE CBC W/AUTO DIFF WBC: CPT

## 2025-08-29 PROCEDURE — 84132 ASSAY OF SERUM POTASSIUM: CPT

## 2025-08-29 PROCEDURE — 6370000000 HC RX 637 (ALT 250 FOR IP): Performed by: INTERNAL MEDICINE

## 2025-08-29 PROCEDURE — 36415 COLL VENOUS BLD VENIPUNCTURE: CPT

## 2025-08-29 PROCEDURE — 51798 US URINE CAPACITY MEASURE: CPT

## 2025-08-29 PROCEDURE — 2060000000 HC ICU INTERMEDIATE R&B

## 2025-08-29 PROCEDURE — 82947 ASSAY GLUCOSE BLOOD QUANT: CPT

## 2025-08-29 RX ORDER — SODIUM BICARBONATE 650 MG/1
650 TABLET ORAL 3 TIMES DAILY
Status: DISCONTINUED | OUTPATIENT
Start: 2025-08-29 | End: 2025-08-31 | Stop reason: HOSPADM

## 2025-08-29 RX ORDER — CARVEDILOL 12.5 MG/1
12.5 TABLET ORAL 2 TIMES DAILY WITH MEALS
Status: DISCONTINUED | OUTPATIENT
Start: 2025-08-29 | End: 2025-08-31 | Stop reason: HOSPADM

## 2025-08-29 RX ORDER — CARVEDILOL 25 MG/1
25 TABLET ORAL 2 TIMES DAILY
Status: DISCONTINUED | OUTPATIENT
Start: 2025-08-29 | End: 2025-08-29

## 2025-08-29 RX ORDER — CARVEDILOL 25 MG/1
25 TABLET ORAL 2 TIMES DAILY WITH MEALS
Status: DISCONTINUED | OUTPATIENT
Start: 2025-08-29 | End: 2025-08-29

## 2025-08-29 RX ADMIN — CARVEDILOL 12.5 MG: 12.5 TABLET, FILM COATED ORAL at 08:01

## 2025-08-29 RX ADMIN — SODIUM BICARBONATE 650 MG: 650 TABLET ORAL at 15:43

## 2025-08-29 RX ADMIN — AMLODIPINE BESYLATE 5 MG: 5 TABLET ORAL at 08:01

## 2025-08-29 RX ADMIN — CARVEDILOL 12.5 MG: 12.5 TABLET, FILM COATED ORAL at 21:04

## 2025-08-29 RX ADMIN — SODIUM BICARBONATE 650 MG: 650 TABLET ORAL at 21:04

## 2025-08-29 RX ADMIN — SODIUM ZIRCONIUM CYCLOSILICATE 5 G: 5 POWDER, FOR SUSPENSION ORAL at 17:38

## 2025-08-29 RX ADMIN — SODIUM ZIRCONIUM CYCLOSILICATE 5 G: 5 POWDER, FOR SUSPENSION ORAL at 08:02

## 2025-08-29 RX ADMIN — SODIUM BICARBONATE 650 MG: 650 TABLET ORAL at 11:06

## 2025-08-29 RX ADMIN — FEBUXOSTAT TABLETS 40 MG 40 MG: 40 FILM ORAL at 08:02

## 2025-08-29 ASSESSMENT — PAIN SCALES - GENERAL: PAINLEVEL_OUTOF10: 0

## 2025-08-30 LAB
ANION GAP SERPL CALCULATED.3IONS-SCNC: 10 MMOL/L (ref 9–16)
BASOPHILS # BLD: <0.03 K/UL (ref 0–0.2)
BASOPHILS NFR BLD: 0 % (ref 0–2)
BUN SERPL-MCNC: 41 MG/DL (ref 8–23)
CALCIUM SERPL-MCNC: 9 MG/DL (ref 8.6–10.4)
CHLORIDE SERPL-SCNC: 109 MMOL/L (ref 98–107)
CO2 SERPL-SCNC: 21 MMOL/L (ref 20–31)
CORTIS SERPL-MCNC: 8.9 UG/DL (ref 2.5–19.5)
CORTISOL COLLECTION INFO: NORMAL
CREAT SERPL-MCNC: 2 MG/DL (ref 0.7–1.2)
EOSINOPHIL # BLD: 0.11 K/UL (ref 0–0.44)
EOSINOPHILS RELATIVE PERCENT: 3 % (ref 0–4)
ERYTHROCYTE [DISTWIDTH] IN BLOOD BY AUTOMATED COUNT: 13.1 % (ref 11.5–14.9)
GFR, ESTIMATED: 33 ML/MIN/1.73M2
GLUCOSE BLD-MCNC: 126 MG/DL (ref 75–110)
GLUCOSE BLD-MCNC: 128 MG/DL (ref 75–110)
GLUCOSE BLD-MCNC: 95 MG/DL (ref 75–110)
GLUCOSE BLD-MCNC: 98 MG/DL (ref 75–110)
GLUCOSE SERPL-MCNC: 105 MG/DL (ref 74–99)
HCT VFR BLD AUTO: 34.9 % (ref 41–53)
HGB BLD-MCNC: 11.4 G/DL (ref 13.5–17.5)
IMM GRANULOCYTES # BLD AUTO: <0.03 K/UL (ref 0–0.3)
IMM GRANULOCYTES NFR BLD: 0 %
LYMPHOCYTES NFR BLD: 1.08 K/UL (ref 1.1–3.7)
LYMPHOCYTES RELATIVE PERCENT: 27 % (ref 24–44)
MCH RBC QN AUTO: 30.8 PG (ref 26–34)
MCHC RBC AUTO-ENTMCNC: 32.7 G/DL (ref 31–37)
MCV RBC AUTO: 94.3 FL (ref 80–100)
MONOCYTES NFR BLD: 0.34 K/UL (ref 0.1–1.2)
MONOCYTES NFR BLD: 9 % (ref 3–12)
NEUTROPHILS NFR BLD: 61 % (ref 36–66)
NEUTS SEG NFR BLD: 2.39 K/UL (ref 1.5–8.1)
NRBC BLD-RTO: 0 PER 100 WBC
PLATELET # BLD AUTO: 71 K/UL (ref 150–450)
PMV BLD AUTO: 11.3 FL (ref 8–13.5)
POTASSIUM SERPL-SCNC: 5.4 MMOL/L (ref 3.7–5.3)
RBC # BLD AUTO: 3.7 M/UL (ref 4.21–5.77)
SODIUM SERPL-SCNC: 140 MMOL/L (ref 136–145)
WBC OTHER # BLD: 3.9 K/UL (ref 3.5–11)

## 2025-08-30 PROCEDURE — 2060000000 HC ICU INTERMEDIATE R&B

## 2025-08-30 PROCEDURE — 6370000000 HC RX 637 (ALT 250 FOR IP): Performed by: INTERNAL MEDICINE

## 2025-08-30 PROCEDURE — 82947 ASSAY GLUCOSE BLOOD QUANT: CPT

## 2025-08-30 PROCEDURE — 99232 SBSQ HOSP IP/OBS MODERATE 35: CPT

## 2025-08-30 PROCEDURE — 84244 ASSAY OF RENIN: CPT

## 2025-08-30 PROCEDURE — 36415 COLL VENOUS BLD VENIPUNCTURE: CPT

## 2025-08-30 PROCEDURE — 2500000003 HC RX 250 WO HCPCS

## 2025-08-30 PROCEDURE — 82533 TOTAL CORTISOL: CPT

## 2025-08-30 PROCEDURE — 85025 COMPLETE CBC W/AUTO DIFF WBC: CPT

## 2025-08-30 PROCEDURE — 6370000000 HC RX 637 (ALT 250 FOR IP)

## 2025-08-30 PROCEDURE — 82088 ASSAY OF ALDOSTERONE: CPT

## 2025-08-30 PROCEDURE — 80048 BASIC METABOLIC PNL TOTAL CA: CPT

## 2025-08-30 RX ORDER — FLUDROCORTISONE ACETATE 0.1 MG/1
0.1 TABLET ORAL ONCE
Status: COMPLETED | OUTPATIENT
Start: 2025-08-30 | End: 2025-08-30

## 2025-08-30 RX ADMIN — CARVEDILOL 12.5 MG: 12.5 TABLET, FILM COATED ORAL at 17:32

## 2025-08-30 RX ADMIN — SODIUM CHLORIDE, PRESERVATIVE FREE 10 ML: 5 INJECTION INTRAVENOUS at 20:11

## 2025-08-30 RX ADMIN — FLUDROCORTISONE ACETATE 0.1 MG: 0.1 TABLET ORAL at 14:35

## 2025-08-30 RX ADMIN — SODIUM BICARBONATE 650 MG: 650 TABLET ORAL at 14:35

## 2025-08-30 RX ADMIN — FEBUXOSTAT TABLETS 40 MG 40 MG: 40 FILM ORAL at 09:24

## 2025-08-30 RX ADMIN — SODIUM BICARBONATE 650 MG: 650 TABLET ORAL at 20:11

## 2025-08-30 RX ADMIN — CARVEDILOL 12.5 MG: 12.5 TABLET, FILM COATED ORAL at 09:22

## 2025-08-30 RX ADMIN — SODIUM CHLORIDE, PRESERVATIVE FREE 10 ML: 5 INJECTION INTRAVENOUS at 20:10

## 2025-08-30 RX ADMIN — SODIUM ZIRCONIUM CYCLOSILICATE 5 G: 5 POWDER, FOR SUSPENSION ORAL at 09:22

## 2025-08-30 RX ADMIN — SODIUM ZIRCONIUM CYCLOSILICATE 5 G: 5 POWDER, FOR SUSPENSION ORAL at 20:11

## 2025-08-30 RX ADMIN — SODIUM BICARBONATE 650 MG: 650 TABLET ORAL at 09:22

## 2025-08-30 ASSESSMENT — PAIN SCALES - GENERAL: PAINLEVEL_OUTOF10: 0

## 2025-08-31 VITALS
HEART RATE: 70 BPM | TEMPERATURE: 98.2 F | SYSTOLIC BLOOD PRESSURE: 113 MMHG | WEIGHT: 193.78 LBS | HEIGHT: 71 IN | OXYGEN SATURATION: 100 % | RESPIRATION RATE: 18 BRPM | DIASTOLIC BLOOD PRESSURE: 67 MMHG | BODY MASS INDEX: 27.13 KG/M2

## 2025-08-31 LAB
ANION GAP SERPL CALCULATED.3IONS-SCNC: 8 MMOL/L (ref 9–16)
BASOPHILS # BLD: <0.03 K/UL (ref 0–0.2)
BASOPHILS NFR BLD: 0 % (ref 0–2)
BUN SERPL-MCNC: 42 MG/DL (ref 8–23)
CALCIUM SERPL-MCNC: 8.8 MG/DL (ref 8.6–10.4)
CHLORIDE SERPL-SCNC: 108 MMOL/L (ref 98–107)
CO2 SERPL-SCNC: 22 MMOL/L (ref 20–31)
COLLECT DURATION TIME SPEC: 24 H
CREAT SERPL-MCNC: 2 MG/DL (ref 0.7–1.2)
EOSINOPHIL # BLD: 0.12 K/UL (ref 0–0.44)
EOSINOPHILS RELATIVE PERCENT: 3 % (ref 0–4)
ERYTHROCYTE [DISTWIDTH] IN BLOOD BY AUTOMATED COUNT: 12.9 % (ref 11.5–14.9)
GFR, ESTIMATED: 33 ML/MIN/1.73M2
GLUCOSE BLD-MCNC: 90 MG/DL (ref 75–110)
GLUCOSE BLD-MCNC: 91 MG/DL (ref 75–110)
GLUCOSE SERPL-MCNC: 97 MG/DL (ref 74–99)
HCT VFR BLD AUTO: 34.6 % (ref 41–53)
HGB BLD-MCNC: 11.2 G/DL (ref 13.5–17.5)
IMM GRANULOCYTES # BLD AUTO: <0.03 K/UL (ref 0–0.3)
IMM GRANULOCYTES NFR BLD: 0 %
LYMPHOCYTES NFR BLD: 1.06 K/UL (ref 1.1–3.7)
LYMPHOCYTES RELATIVE PERCENT: 27 % (ref 24–44)
MCH RBC QN AUTO: 30.5 PG (ref 26–34)
MCHC RBC AUTO-ENTMCNC: 32.4 G/DL (ref 31–37)
MCV RBC AUTO: 94.3 FL (ref 80–100)
MONOCYTES NFR BLD: 0.36 K/UL (ref 0.1–1.2)
MONOCYTES NFR BLD: 9 % (ref 3–12)
NEUTROPHILS NFR BLD: 61 % (ref 36–66)
NEUTS SEG NFR BLD: 2.4 K/UL (ref 1.5–8.1)
NRBC BLD-RTO: 0 PER 100 WBC
PLATELET # BLD AUTO: 60 K/UL (ref 150–450)
PMV BLD AUTO: 10.8 FL (ref 8–13.5)
POTASSIUM 24 HOUR URINE: 57 MMOL/24 H (ref 25–125)
POTASSIUM SERPL-SCNC: 5.1 MMOL/L (ref 3.7–5.3)
POTASSIUM, UR: 27 MMOL/L (ref 25–125)
RBC # BLD AUTO: 3.67 M/UL (ref 4.21–5.77)
SODIUM SERPL-SCNC: 138 MMOL/L (ref 136–145)
SPECIMEN VOL UR: 2100 ML
WBC OTHER # BLD: 4 K/UL (ref 3.5–11)

## 2025-08-31 PROCEDURE — 84133 ASSAY OF URINE POTASSIUM: CPT

## 2025-08-31 PROCEDURE — 85025 COMPLETE CBC W/AUTO DIFF WBC: CPT

## 2025-08-31 PROCEDURE — 80048 BASIC METABOLIC PNL TOTAL CA: CPT

## 2025-08-31 PROCEDURE — 81050 URINALYSIS VOLUME MEASURE: CPT

## 2025-08-31 PROCEDURE — 99232 SBSQ HOSP IP/OBS MODERATE 35: CPT

## 2025-08-31 PROCEDURE — 6370000000 HC RX 637 (ALT 250 FOR IP)

## 2025-08-31 PROCEDURE — 99239 HOSP IP/OBS DSCHRG MGMT >30: CPT

## 2025-08-31 PROCEDURE — 6370000000 HC RX 637 (ALT 250 FOR IP): Performed by: INTERNAL MEDICINE

## 2025-08-31 PROCEDURE — 82947 ASSAY GLUCOSE BLOOD QUANT: CPT

## 2025-08-31 PROCEDURE — 36415 COLL VENOUS BLD VENIPUNCTURE: CPT

## 2025-08-31 RX ORDER — FLUDROCORTISONE ACETATE 0.1 MG/1
0.1 TABLET ORAL
Qty: 15 TABLET | Refills: 0 | Status: SHIPPED | OUTPATIENT
Start: 2025-09-02 | End: 2025-08-31

## 2025-08-31 RX ORDER — SODIUM BICARBONATE 650 MG/1
650 TABLET ORAL 3 TIMES DAILY
Qty: 90 TABLET | Refills: 0 | Status: SHIPPED | OUTPATIENT
Start: 2025-08-31 | End: 2025-08-31

## 2025-08-31 RX ORDER — SODIUM BICARBONATE 650 MG/1
650 TABLET ORAL 3 TIMES DAILY
Qty: 90 TABLET | Refills: 0 | Status: SHIPPED | OUTPATIENT
Start: 2025-08-31 | End: 2025-09-30

## 2025-08-31 RX ORDER — FLUDROCORTISONE ACETATE 0.1 MG/1
0.1 TABLET ORAL
Status: DISCONTINUED | OUTPATIENT
Start: 2025-08-31 | End: 2025-08-31 | Stop reason: HOSPADM

## 2025-08-31 RX ORDER — FLUDROCORTISONE ACETATE 0.1 MG/1
0.1 TABLET ORAL
Qty: 15 TABLET | Refills: 0 | Status: SHIPPED | OUTPATIENT
Start: 2025-09-02 | End: 2025-10-02

## 2025-08-31 RX ADMIN — FEBUXOSTAT TABLETS 40 MG 40 MG: 40 FILM ORAL at 09:06

## 2025-08-31 RX ADMIN — SODIUM BICARBONATE 650 MG: 650 TABLET ORAL at 09:04

## 2025-08-31 RX ADMIN — SODIUM ZIRCONIUM CYCLOSILICATE 5 G: 5 POWDER, FOR SUSPENSION ORAL at 09:04

## 2025-08-31 RX ADMIN — SODIUM BICARBONATE 650 MG: 650 TABLET ORAL at 14:05

## 2025-08-31 RX ADMIN — CARVEDILOL 12.5 MG: 12.5 TABLET, FILM COATED ORAL at 09:04

## 2025-08-31 RX ADMIN — FLUDROCORTISONE ACETATE 0.1 MG: 0.1 TABLET ORAL at 10:42

## 2025-09-02 ENCOUNTER — CARE COORDINATION (OUTPATIENT)
Dept: CARE COORDINATION | Age: 80
End: 2025-09-02

## 2025-09-02 ENCOUNTER — TELEPHONE (OUTPATIENT)
Dept: INTERNAL MEDICINE CLINIC | Age: 80
End: 2025-09-02

## 2025-09-02 DIAGNOSIS — E87.5 HYPERKALEMIA: Primary | ICD-10-CM

## 2025-09-02 LAB
ALDOST SERPL-MCNC: 4.4 NG/DL
ALDOSTERONE COMMENT: NORMAL
RENIN COMMENT: NORMAL
RENIN PLAS-CCNC: <0.1 NG/ML/HR

## 2025-09-04 ENCOUNTER — CARE COORDINATION (OUTPATIENT)
Dept: CARE COORDINATION | Age: 80
End: 2025-09-04

## 2025-09-05 ENCOUNTER — HOSPITAL ENCOUNTER (OUTPATIENT)
Age: 80
Setting detail: SPECIMEN
Discharge: HOME | End: 2025-09-05

## 2025-09-05 DIAGNOSIS — E87.5 HYPERKALEMIA: ICD-10-CM

## 2025-09-05 LAB
ANION GAP SERPL CALCULATED.3IONS-SCNC: 10 MMOL/L (ref 9–16)
BUN SERPL-MCNC: 54 MG/DL (ref 8–23)
CALCIUM SERPL-MCNC: 9.2 MG/DL (ref 8.6–10.4)
CHLORIDE SERPL-SCNC: 109 MMOL/L (ref 98–107)
CO2 SERPL-SCNC: 22 MMOL/L (ref 20–31)
CREAT SERPL-MCNC: 1.9 MG/DL (ref 0.7–1.2)
GFR, ESTIMATED: 35 ML/MIN/1.73M2
GLUCOSE SERPL-MCNC: 100 MG/DL (ref 74–99)
POTASSIUM SERPL-SCNC: 4.9 MMOL/L (ref 3.7–5.3)
SODIUM SERPL-SCNC: 141 MMOL/L (ref 136–145)

## (undated) DEVICE — GLOVE ORANGE PI 7   MSG9070

## (undated) DEVICE — GLOVE SURG SZ 75 STD WHT LTX SYN POLYMER BEAD REINF ANTI RL

## (undated) DEVICE — ENDO KIT W/SYRINGE: Brand: MEDLINE INDUSTRIES, INC.

## (undated) DEVICE — Z DISCONTINUED BY MEDLINE USE 2711682 TRAY SKIN PREP DRY W/ PREM GLV

## (undated) DEVICE — SOLUTION PREP POVIDONE IOD FOR SKIN MUCOUS MEM PRIOR TO

## (undated) DEVICE — DEFENDO AIR WATER SUCTION AND BIOPSY VALVE KIT FOR  OLYMPUS: Brand: DEFENDO AIR/WATER/SUCTION AND BIOPSY VALVE

## (undated) DEVICE — COVER,C-ARM,41X74: Brand: MEDLINE

## (undated) DEVICE — SHIELD EYE PLAS CATRCT PT CARE

## (undated) DEVICE — BITEBLOCK 54FR W/ DENT RIM BLOX

## (undated) DEVICE — TOWEL,OR,DSP,ST,WHITE,DLX,2/PK,40PK/CS: Brand: MEDLINE

## (undated) DEVICE — SURGICAL PROCEDURE PACK LT EYE CUST ST CHARLES STRL LF DISP

## (undated) DEVICE — GOWN,AURORA,NONREINFORCED,LARGE: Brand: MEDLINE

## (undated) DEVICE — SOLUTION IRRIGATION BAL SALT SOLUTION 500 ML STRL BSS

## (undated) DEVICE — FORCEPS BX L240CM WRK CHN 2.8MM STD CAP W/ NDL MIC MESH

## (undated) DEVICE — COVER,MAYO STAND,STERILE: Brand: MEDLINE

## (undated) DEVICE — STRAP POS MP 30X3 IN HK LOOP CLOSURE FOAM DISP

## (undated) DEVICE — LABEL MED EYE STRL

## (undated) DEVICE — GAUZE,SPONGE,4"X4",16PLY,XRAY,STRL,LF: Brand: MEDLINE